# Patient Record
Sex: FEMALE | Race: WHITE | Employment: OTHER | URBAN - METROPOLITAN AREA
[De-identification: names, ages, dates, MRNs, and addresses within clinical notes are randomized per-mention and may not be internally consistent; named-entity substitution may affect disease eponyms.]

---

## 2018-06-06 RX ORDER — CARVEDILOL 25 MG/1
25 TABLET ORAL 2 TIMES DAILY WITH MEALS
Qty: 180 TABLET | Refills: 3 | Status: SHIPPED | OUTPATIENT
Start: 2018-06-06 | End: 2019-06-13 | Stop reason: SDUPTHER

## 2018-07-09 LAB — DIABETIC RETINOPATHY: NEGATIVE

## 2018-07-12 ENCOUNTER — OFFICE VISIT (OUTPATIENT)
Dept: CARDIOLOGY CLINIC | Age: 68
End: 2018-07-12
Payer: COMMERCIAL

## 2018-07-12 VITALS
HEART RATE: 79 BPM | WEIGHT: 245 LBS | RESPIRATION RATE: 16 BRPM | SYSTOLIC BLOOD PRESSURE: 134 MMHG | HEIGHT: 62 IN | BODY MASS INDEX: 45.08 KG/M2 | DIASTOLIC BLOOD PRESSURE: 78 MMHG

## 2018-07-12 DIAGNOSIS — I10 ESSENTIAL HYPERTENSION: Primary | ICD-10-CM

## 2018-07-12 DIAGNOSIS — R01.1 CARDIAC MURMUR: ICD-10-CM

## 2018-07-12 PROCEDURE — 99213 OFFICE O/P EST LOW 20 MIN: CPT | Performed by: INTERNAL MEDICINE

## 2018-07-12 PROCEDURE — 93000 ELECTROCARDIOGRAM COMPLETE: CPT | Performed by: INTERNAL MEDICINE

## 2018-07-12 NOTE — PROGRESS NOTES
vein thrombosis\". 20. Limited echo, 07/20/2016. EF 55-65%  Mild CLVH. Stage II diastolic dysfunction. Mild LA dilatation. 21. ER evaluation, 03/22/2016. Facial swelling. Lisinopril stopped, 03/2016.   22. Follow up with Dr. Joaquina Granado, 2017.        Review of Systems:  Constitutional: negative for fever and chills  Respiratory: negative for cough and hemoptysis  Cardiovascular:   Gastrointestinal: negative for abdominal pain, diarrhea, nausea and vomiting  Genitourinary:negative for dysuria and hematuria  Derm: negative for rash and skin lesion(s)  Neurological: negative for seizures and tremors  Endocrine: negative for diabetic symptoms including polydipsia and polyuria  Musculoskeletal: negative for CTD  Psychiatric: negative for psychosis and major depression    On examination,  she is an overweight, pleasant, middle-aged female in no distress. Skin is warm and dry. Respirations are unlabored. /78   Pulse 79   Resp 16   Ht 5' 2\" (1.575 m)   Wt 245 lb (111.1 kg)   BMI 44.81 kg/m² . HEENT negative for scleral icterus. Extraocular muscles intact. No facial asymmetry or central cyanosis. Neck without masses or goiter. No bruit or JVD. Cardiac apex not displaced. Rhythm regular. 2/6 MAYDA upper left sternal border. Diastole Yaquelin. Abdomen normal.  Extremities without edema. EKG today shows  Normal sinus rhythm. LBBB. She continues to have a heart murmur. She has a history of depressed ejection fraction. An echo will therefore be repeated for reassessment. She had concerns today regarding anticoagulation and its continuation. She saw  Dr. Joaquina Granado, 2017. His consult note will be obtained and final recommendations made.     At completion of today's visit, medications include the following:    Current Outpatient Prescriptions:     carvedilol (COREG) 25 MG tablet, Take 1 tablet by mouth 2 times daily (with meals), Disp: 180 tablet, Rfl: 3    alendronate (FOSAMAX)

## 2018-07-31 ENCOUNTER — HOSPITAL ENCOUNTER (OUTPATIENT)
Dept: CARDIOLOGY | Age: 68
Discharge: HOME | End: 2018-07-31
Payer: COMMERCIAL

## 2018-07-31 DIAGNOSIS — R01.1 CARDIAC MURMUR: ICD-10-CM

## 2018-07-31 LAB
LV EF: 58 %
LVEF MODALITY: NORMAL

## 2018-07-31 PROCEDURE — 93306 TTE W/DOPPLER COMPLETE: CPT | Performed by: PSYCHIATRY & NEUROLOGY

## 2018-08-08 ENCOUNTER — TELEPHONE (OUTPATIENT)
Dept: CARDIOLOGY CLINIC | Age: 68
End: 2018-08-08

## 2019-03-08 ENCOUNTER — HOSPITAL ENCOUNTER (OUTPATIENT)
Dept: INFUSION THERAPY | Age: 69
Discharge: HOME | End: 2019-03-08
Payer: COMMERCIAL

## 2019-03-08 ENCOUNTER — OFFICE VISIT (OUTPATIENT)
Dept: ONCOLOGY | Age: 69
End: 2019-03-08
Payer: COMMERCIAL

## 2019-03-08 ENCOUNTER — HOSPITAL ENCOUNTER (OUTPATIENT)
Age: 69
Discharge: HOME | End: 2019-03-08
Payer: COMMERCIAL

## 2019-03-08 VITALS
HEIGHT: 62 IN | SYSTOLIC BLOOD PRESSURE: 135 MMHG | DIASTOLIC BLOOD PRESSURE: 69 MMHG | RESPIRATION RATE: 18 BRPM | TEMPERATURE: 98.9 F | BODY MASS INDEX: 45.36 KG/M2 | HEART RATE: 79 BPM | WEIGHT: 246.5 LBS

## 2019-03-08 DIAGNOSIS — O22.30 DVT (DEEP VEIN THROMBOSIS) IN PREGNANCY: ICD-10-CM

## 2019-03-08 DIAGNOSIS — O22.30 DVT (DEEP VEIN THROMBOSIS) IN PREGNANCY: Primary | ICD-10-CM

## 2019-03-08 LAB
ALBUMIN SERPL-MCNC: 3.7 G/DL (ref 3.5–5.2)
ALP BLD-CCNC: 41 U/L (ref 35–104)
ALT SERPL-CCNC: 13 U/L (ref 0–32)
ANION GAP SERPL CALCULATED.3IONS-SCNC: 10 MMOL/L (ref 7–16)
AST SERPL-CCNC: 26 U/L (ref 0–31)
BASOPHILS ABSOLUTE: 0.06 E9/L (ref 0–0.2)
BASOPHILS RELATIVE PERCENT: 0.9 % (ref 0–2)
BILIRUB SERPL-MCNC: 0.5 MG/DL (ref 0–1.2)
BUN BLDV-MCNC: 15 MG/DL (ref 8–23)
CALCIUM SERPL-MCNC: 9.1 MG/DL (ref 8.6–10.2)
CHLORIDE BLD-SCNC: 97 MMOL/L (ref 98–107)
CO2: 29 MMOL/L (ref 22–29)
CREAT SERPL-MCNC: 0.9 MG/DL (ref 0.5–1)
EOSINOPHILS ABSOLUTE: 0.18 E9/L (ref 0.05–0.5)
EOSINOPHILS RELATIVE PERCENT: 2.6 % (ref 0–6)
GFR AFRICAN AMERICAN: >60
GFR NON-AFRICAN AMERICAN: >60 ML/MIN/1.73
GLUCOSE BLD-MCNC: 172 MG/DL (ref 74–99)
HCT VFR BLD CALC: 41.4 % (ref 34–48)
HEMOGLOBIN: 13.6 G/DL (ref 11.5–15.5)
IMMATURE GRANULOCYTES #: 0.02 E9/L
IMMATURE GRANULOCYTES %: 0.3 % (ref 0–5)
LYMPHOCYTES ABSOLUTE: 1.69 E9/L (ref 1.5–4)
LYMPHOCYTES RELATIVE PERCENT: 24.7 % (ref 20–42)
MCH RBC QN AUTO: 29.8 PG (ref 26–35)
MCHC RBC AUTO-ENTMCNC: 32.9 % (ref 32–34.5)
MCV RBC AUTO: 90.8 FL (ref 80–99.9)
MONOCYTES ABSOLUTE: 0.66 E9/L (ref 0.1–0.95)
MONOCYTES RELATIVE PERCENT: 9.6 % (ref 2–12)
NEUTROPHILS ABSOLUTE: 4.24 E9/L (ref 1.8–7.3)
NEUTROPHILS RELATIVE PERCENT: 61.9 % (ref 43–80)
PDW BLD-RTO: 12.4 FL (ref 11.5–15)
PLATELET # BLD: 165 E9/L (ref 130–450)
PMV BLD AUTO: 9.8 FL (ref 7–12)
POTASSIUM SERPL-SCNC: 4.8 MMOL/L (ref 3.5–5)
RBC # BLD: 4.56 E12/L (ref 3.5–5.5)
SODIUM BLD-SCNC: 136 MMOL/L (ref 132–146)
TOTAL PROTEIN: 7.4 G/DL (ref 6.4–8.3)
WBC # BLD: 6.9 E9/L (ref 4.5–11.5)

## 2019-03-08 PROCEDURE — 85025 COMPLETE CBC W/AUTO DIFF WBC: CPT

## 2019-03-08 PROCEDURE — 36415 COLL VENOUS BLD VENIPUNCTURE: CPT

## 2019-03-08 PROCEDURE — 81241 F5 GENE: CPT

## 2019-03-08 PROCEDURE — 81240 F2 GENE: CPT

## 2019-03-08 PROCEDURE — 99205 OFFICE O/P NEW HI 60 MIN: CPT | Performed by: INTERNAL MEDICINE

## 2019-03-08 PROCEDURE — 86147 CARDIOLIPIN ANTIBODY EA IG: CPT

## 2019-03-08 PROCEDURE — 86146 BETA-2 GLYCOPROTEIN ANTIBODY: CPT

## 2019-03-08 PROCEDURE — 99214 OFFICE O/P EST MOD 30 MIN: CPT | Performed by: INTERNAL MEDICINE

## 2019-03-08 PROCEDURE — 80053 COMPREHEN METABOLIC PANEL: CPT

## 2019-03-11 LAB
ANTICARDIOLIPIN IGA ANTIBODY: 3 APL (ref 0–11)
ANTICARDIOLIPIN IGG ANTIBODY: 0 GPL (ref 0–14)
BETA-2 GLYCOPROTEIN 1 IGG ANTIBODY: 0 SGU (ref 0–20)
BETA-2 GLYCOPROTEIN 1 IGM ANTIBODY: 0 SMU (ref 0–20)
CARDIOLIPIN AB IGM: 0 MPL (ref 0–12)

## 2019-03-14 LAB
FACTOR V LEIDEN: ABNORMAL
SPECIMEN: ABNORMAL

## 2019-03-15 LAB
PROTHROMBIN G20210A MUTATION: NEGATIVE
PT PCR SPECIMEN: NORMAL

## 2019-03-29 ENCOUNTER — HOSPITAL ENCOUNTER (OUTPATIENT)
Dept: INFUSION THERAPY | Age: 69
Discharge: HOME | End: 2019-03-29
Payer: COMMERCIAL

## 2019-03-29 ENCOUNTER — OFFICE VISIT (OUTPATIENT)
Dept: ONCOLOGY | Age: 69
End: 2019-03-29
Payer: COMMERCIAL

## 2019-03-29 ENCOUNTER — HOSPITAL ENCOUNTER (OUTPATIENT)
Age: 69
Discharge: HOME | End: 2019-03-29
Payer: COMMERCIAL

## 2019-03-29 VITALS
SYSTOLIC BLOOD PRESSURE: 143 MMHG | BODY MASS INDEX: 46.04 KG/M2 | HEIGHT: 62 IN | DIASTOLIC BLOOD PRESSURE: 67 MMHG | WEIGHT: 250.2 LBS | HEART RATE: 72 BPM

## 2019-03-29 DIAGNOSIS — O22.30 DVT (DEEP VEIN THROMBOSIS) IN PREGNANCY: Primary | ICD-10-CM

## 2019-03-29 PROCEDURE — 99213 OFFICE O/P EST LOW 20 MIN: CPT | Performed by: INTERNAL MEDICINE

## 2019-03-29 PROCEDURE — 99214 OFFICE O/P EST MOD 30 MIN: CPT | Performed by: INTERNAL MEDICINE

## 2019-05-07 ENCOUNTER — OFFICE VISIT (OUTPATIENT)
Dept: FAMILY MEDICINE CLINIC | Age: 69
End: 2019-05-07
Payer: COMMERCIAL

## 2019-05-07 VITALS
BODY MASS INDEX: 47.11 KG/M2 | OXYGEN SATURATION: 97 % | DIASTOLIC BLOOD PRESSURE: 84 MMHG | TEMPERATURE: 98.8 F | SYSTOLIC BLOOD PRESSURE: 136 MMHG | HEART RATE: 72 BPM | WEIGHT: 256 LBS | HEIGHT: 62 IN

## 2019-05-07 DIAGNOSIS — J01.90 ACUTE BACTERIAL SINUSITIS: ICD-10-CM

## 2019-05-07 DIAGNOSIS — B96.89 ACUTE BACTERIAL SINUSITIS: ICD-10-CM

## 2019-05-07 DIAGNOSIS — B35.4 TINEA CORPORIS: ICD-10-CM

## 2019-05-07 DIAGNOSIS — J40 BRONCHITIS: Primary | ICD-10-CM

## 2019-05-07 PROCEDURE — 94640 AIRWAY INHALATION TREATMENT: CPT | Performed by: FAMILY MEDICINE

## 2019-05-07 PROCEDURE — 96372 THER/PROPH/DIAG INJ SC/IM: CPT | Performed by: FAMILY MEDICINE

## 2019-05-07 PROCEDURE — 99213 OFFICE O/P EST LOW 20 MIN: CPT | Performed by: FAMILY MEDICINE

## 2019-05-07 RX ORDER — PREDNISONE 20 MG/1
TABLET ORAL
Qty: 30 TABLET | Refills: 0 | Status: SHIPPED | OUTPATIENT
Start: 2019-05-07 | End: 2019-05-31

## 2019-05-07 RX ORDER — CEFDINIR 300 MG/1
300 CAPSULE ORAL 2 TIMES DAILY
Qty: 20 CAPSULE | Refills: 0 | Status: SHIPPED | OUTPATIENT
Start: 2019-05-07 | End: 2019-05-17

## 2019-05-07 RX ORDER — METHYLPREDNISOLONE ACETATE 40 MG/ML
40 INJECTION, SUSPENSION INTRA-ARTICULAR; INTRALESIONAL; INTRAMUSCULAR; SOFT TISSUE ONCE
Qty: 1 ML | Refills: 0
Start: 2019-05-07 | End: 2019-05-07

## 2019-05-07 RX ORDER — ALBUTEROL SULFATE 1.25 MG/3ML
1.25 SOLUTION RESPIRATORY (INHALATION) ONCE
Status: COMPLETED | OUTPATIENT
Start: 2019-05-07 | End: 2019-05-07

## 2019-05-07 RX ORDER — METHYLPREDNISOLONE ACETATE 40 MG/ML
40 INJECTION, SUSPENSION INTRA-ARTICULAR; INTRALESIONAL; INTRAMUSCULAR; SOFT TISSUE ONCE
Status: COMPLETED | OUTPATIENT
Start: 2019-05-07 | End: 2019-05-07

## 2019-05-07 RX ADMIN — ALBUTEROL SULFATE 1.25 MG: 1.25 SOLUTION RESPIRATORY (INHALATION) at 11:24

## 2019-05-07 RX ADMIN — METHYLPREDNISOLONE ACETATE 40 MG: 40 INJECTION, SUSPENSION INTRA-ARTICULAR; INTRALESIONAL; INTRAMUSCULAR; SOFT TISSUE at 11:28

## 2019-05-07 ASSESSMENT — ENCOUNTER SYMPTOMS
GASTROINTESTINAL NEGATIVE: 1
COUGH: 1
WHEEZING: 1

## 2019-05-07 NOTE — PROGRESS NOTES
[facility]) Physicians   Internal Medicine     2019  Gaby Kelley : 1950 Sex: female  Age:73 y.o. Chief Complaint   Patient presents with    Congestion     [id number]    Wheezing        HPI  Patient presents to express care with c/o cough and congestion and wheezing for past 2 weeks, denies fever/chills. Patient with c/o redness around umbilicus. Review of Systems   Constitutional: Negative. HENT: Positive for congestion and postnasal drip. Respiratory: Positive for cough and wheezing. Cardiovascular: Negative. Gastrointestinal: Negative. Endocrine: Negative. Genitourinary: Negative. Skin: Positive for rash. Current Outpatient Medications:     methylPREDNISolone acetate (DEPO-MEDROL) 40 MG/ML injection, Inject 1 mL into the muscle once for 1 dose, Disp: 1 mL, Rfl: 0    cefdinir (OMNICEF) 300 MG capsule, Take 1 capsule by mouth 2 times daily for 10 days, Disp: 20 capsule, Rfl: 0    predniSONE (DELTASONE) 20 MG tablet, 1 po bid x 5 days, then 1/2 po daily x 5 days, Disp: 30 tablet, Rfl: 0    nystatin-triamcinolone (MYCOLOG II) 147142-5.1 UNIT/GM-% cream, Apply topically 2 times daily. , Disp: 30 g, Rfl: 0    PROAIR  (90 Base) MCG/ACT inhaler, Inhale 1 puff into the lungs 4 times daily, Disp: 1 Inhaler, Rfl: 1    QVAR 80 MCG/ACT inhaler, Inhale 1 puff into the lungs 2 times daily, Disp: 1 Inhaler, Rfl: 1    apixaban (ELIQUIS) 5 MG TABS tablet, Take 1 tablet by mouth 2 times daily To be started on 2019, Disp: 60 tablet, Rfl: 3    carvedilol (COREG) 25 MG tablet, Take 1 tablet by mouth 2 times daily (with meals), Disp: 180 tablet, Rfl: 3    alendronate (FOSAMAX) 70 MG tablet, Take 70 mg by mouth every 7 days , Disp: , Rfl:     potassium chloride (MICRO-K) 10 MEQ extended release capsule, Take 10 mEq by mouth every other day , Disp: , Rfl:     furosemide (LASIX) 20 MG tablet, 20 mg daily, Disp: , Rfl:     hydrALAZINE (APRESOLINE) 50 MG tablet, 50 mg 2 times daily, Disp: , Rfl:     traMADol (ULTRAM) 50 MG tablet, Take 50 mg by mouth every 6 hours as needed , Disp: , Rfl:     lovastatin (MEVACOR) 40 MG tablet, Take 40 mg by mouth nightly , Disp: , Rfl:     metFORMIN (GLUCOPHAGE) 500 MG tablet, Take 500 mg by mouth daily (with breakfast) , Disp: , Rfl:     VITAMIN D-3 (COLECALCIFEROL) 400 UNITS TABS, Take  by mouth daily. , Disp: , Rfl:     enoxaparin (LOVENOX) 100 MG/ML injection, Inject into the skin 2 times daily, Disp: , Rfl:     Allergies   Allergen Reactions    E-Mycin [Erythromycin]      Made her dizzy and she didn't feel well       Past Medical History:   Diagnosis Date    Hyperlipidemia     Hypertension     LBBB (left bundle branch block)     Obesity        Past Surgical History:   Procedure Laterality Date    APPENDECTOMY      CHOLECYSTECTOMY      COLOSTOMY      due to divertiulosis since than it was reversed    HERNIA REPAIR      HYSTERECTOMY, TOTAL ABDOMINAL         Family History   Problem Relation Age of Onset    Cancer Mother     Diabetes Father     Kidney Disease Father        Social History     Socioeconomic History    Marital status:      Spouse name: Not on file    Number of children: Not on file    Years of education: Not on file    Highest education level: Not on file   Occupational History    Not on file   Social Needs    Financial resource strain: Not on file    Food insecurity:     Worry: Not on file     Inability: Not on file    Transportation needs:     Medical: Not on file     Non-medical: Not on file   Tobacco Use    Smoking status: Former Smoker     Packs/day: 1.00     Years: 40.00     Pack years: 40.00     Types: Cigarettes     Last attempt to quit: 2008     Years since quittin.3    Smokeless tobacco: Never Used   Substance and Sexual Activity    Alcohol use: Yes     Comment: occassionally    Drug use: No    Sexual activity: Not Currently   Lifestyle    Physical activity: Days per week: Not on file     Minutes per session: Not on file    Stress: Not on file   Relationships    Social connections:     Talks on phone: Not on file     Gets together: Not on file     Attends Anglican service: Not on file     Active member of club or organization: Not on file     Attends meetings of clubs or organizations: Not on file     Relationship status: Not on file    Intimate partner violence:     Fear of current or ex partner: Not on file     Emotionally abused: Not on file     Physically abused: Not on file     Forced sexual activity: Not on file   Other Topics Concern    Not on file   Social History Narrative    Not on file       Vitals:    05/07/19 1040   BP: 136/84   Pulse: 72   Temp: 98.8 °F (37.1 °C)   SpO2: 97%   Weight: 256 lb (116.1 kg)   Height: 5' 2\" (1.575 m)       Exam:  Physical Exam   Constitutional: She appears well-developed and well-nourished. HENT:   Head: Normocephalic and atraumatic. Eyes: Pupils are equal, round, and reactive to light. Conjunctivae and EOM are normal.   Neck: Neck supple. Cardiovascular: Normal rate and regular rhythm. Pulmonary/Chest: She has wheezes. Aerosol treatment with albuterol given with improvement in breath sounds and wheezing   Abdominal: Soft. Bowel sounds are normal.   Skin:   Mild erythema noted umbilicus       Assessment and Plan:    Yoeltte Dawn was seen today for congestion and wheezing. Diagnoses and all orders for this visit:    Bronchitis  -     methylPREDNISolone acetate (DEPO-MEDROL) injection 40 mg    Acute bacterial sinusitis  -     methylPREDNISolone acetate (DEPO-MEDROL) injection 40 mg    Tinea corporis    Other orders  -     methylPREDNISolone acetate (DEPO-MEDROL) 40 MG/ML injection; Inject 1 mL into the muscle once for 1 dose  -     albuterol (ACCUNEB) nebulizer solution 1.25 mg  -     cefdinir (OMNICEF) 300 MG capsule;  Take 1 capsule by mouth 2 times daily for 10 days  -     predniSONE (DELTASONE) 20 MG tablet; 1 po bid x 5 days, then 1/2 po daily x 5 days  -     nystatin-triamcinolone (MYCOLOG II) 666323-6.1 UNIT/GM-% cream; Apply topically 2 times daily.  -     PROAIR  (90 Base) MCG/ACT inhaler; Inhale 1 puff into the lungs 4 times daily  -     QVAR 80 MCG/ACT inhaler; Inhale 1 puff into the lungs 2 times daily       Fluids, rest, probiotic, tylenol and/or motrin for fever or discomfort, discussed s/s that would warrant reeval  No follow-ups on file. No orders of the defined types were placed in this encounter.       Sreekanth Moyer MD  5/7/2019  12:10 PM

## 2019-05-17 DIAGNOSIS — I10 HTN (HYPERTENSION): ICD-10-CM

## 2019-05-17 DIAGNOSIS — I51.9 SYSTOLIC DYSFUNCTION: ICD-10-CM

## 2019-05-17 DIAGNOSIS — G89.4 CHRONIC PAIN SYNDROME: Primary | ICD-10-CM

## 2019-05-17 DIAGNOSIS — I51.89 DIASTOLIC DYSFUNCTION: ICD-10-CM

## 2019-05-17 RX ORDER — TRAMADOL HYDROCHLORIDE 50 MG/1
50 TABLET ORAL EVERY 6 HOURS PRN
Qty: 90 TABLET | Refills: 0 | Status: SHIPPED | OUTPATIENT
Start: 2019-05-17 | End: 2019-06-16

## 2019-05-20 ENCOUNTER — TELEPHONE (OUTPATIENT)
Dept: ONCOLOGY | Age: 69
End: 2019-05-20

## 2019-05-20 NOTE — TELEPHONE ENCOUNTER
Spoke with Dallas Dick this morning to give her scheduled appt info for her [facility] for [address] [place] [address] facility. She is scheduled Thurs 10/23. Arrive at 10:15. Scan at 10:45. Go to [facility]. [facility] address for [facility]. Dallas Dick wanted to know if she could go to [facility] facility in [place]. I gave her scheduling's phone number to call back and reschedule if possible. Verified Mirna's appt with Dr. Glendora Goldmann for 5/31 and explained if she rescheduled her scan it would need to be before the 5/31 appt with Dr. Glendora Goldmann.

## 2019-05-23 ENCOUNTER — HOSPITAL ENCOUNTER (OUTPATIENT)
Dept: ULTRASOUND IMAGING | Age: 69
Discharge: HOME | End: 2019-05-25
Payer: COMMERCIAL

## 2019-05-23 DIAGNOSIS — O22.30 DVT (DEEP VEIN THROMBOSIS) IN PREGNANCY: ICD-10-CM

## 2019-05-23 PROCEDURE — 93971 EXTREMITY STUDY: CPT

## 2019-05-31 ENCOUNTER — OFFICE VISIT (OUTPATIENT)
Dept: ONCOLOGY | Age: 69
End: 2019-05-31
Payer: COMMERCIAL

## 2019-05-31 ENCOUNTER — HOSPITAL ENCOUNTER (OUTPATIENT)
Dept: INFUSION THERAPY | Age: 69
Discharge: HOME | End: 2019-05-31
Payer: COMMERCIAL

## 2019-05-31 VITALS
HEIGHT: 62 IN | TEMPERATURE: 96 F | BODY MASS INDEX: 46.91 KG/M2 | SYSTOLIC BLOOD PRESSURE: 149 MMHG | WEIGHT: 254.9 LBS | DIASTOLIC BLOOD PRESSURE: 69 MMHG | HEART RATE: 67 BPM

## 2019-05-31 DIAGNOSIS — O22.30 DVT (DEEP VEIN THROMBOSIS) IN PREGNANCY: Primary | ICD-10-CM

## 2019-05-31 PROCEDURE — 99212 OFFICE O/P EST SF 10 MIN: CPT | Performed by: INTERNAL MEDICINE

## 2019-05-31 PROCEDURE — 99214 OFFICE O/P EST MOD 30 MIN: CPT | Performed by: INTERNAL MEDICINE

## 2019-06-02 NOTE — PROGRESS NOTES
[address] MED ONCOLOGY  [facility] [id number]  Dept: [phone]  Attending Progress Note      Reason for Visit:   1. Recurrent VTE. 2. Factor V Leiden Homozygosity. Referring Physician:  Yulissa Peralta NP    PCP:  Marisol Renteria MD    History of Present Illness: The patient is a pleasant 59-year-old lady with a PMH significant for obesity, HTN, and hyperlipidemia, who was diagnosed with PE and bilateral DVTs that were provoked following ventral hernia repair in March 2017, she had her surgery done in [facility], she was anticoagulated with Coumadin, she was complaining of left knee pain, had a venous US done on 3/1/2019, revealing an acute DVT in the left posterior tibial vein, chronic appearing thromus from proximal femoral to peroneal veins. She was started on Lovenox 100 mg subq bid, INR was therapeutic, 2.2. She does not like the injections, prefers to be on an oral anticoagulant, AC was changed to Eliquis, no reported SE. FH is negative for VTE. The pain in the LLE had resolved, she has LLE, but she has not been taking the lasix as prescribed. Review of Systems;  CONSTITUTIONAL: No fever, chills. Good appetite, fair energy level. Pos for intentional weight loss. ENMT: Eyes: No diplopia; Nose: No epistaxis. Mouth: No sore throat. RESPIRATORY: No hemoptysis, shortness of breath, cough. CARDIOVASCULAR: No chest pain, palpitations. GASTROINTESTINAL: No nausea/vomiting, abdominal pain, diarrhea/constipation. GENITOURINARY: No dysuria, urinary frequency, hematuria. NEURO: No syncope, presyncope, headache.   Remainder:  ROS NEGATIVE    Past Medical History:      Diagnosis Date    Hyperlipidemia     Hypertension     LBBB (left bundle branch block)     Obesity      Patient Active Problem List   Diagnosis    Hyperlipidemia    Hypertension    LBBB (left bundle branch block)        Past Surgical History: Procedure Laterality Date    APPENDECTOMY      CHOLECYSTECTOMY      COLOSTOMY      due to divertiulosis since than it was reversed    HERNIA REPAIR      HYSTERECTOMY, TOTAL ABDOMINAL         Family History:  Family History   Problem Relation Age of Onset    Cancer Mother     Diabetes Father     Kidney Disease Father        Medications:  Reviewed and reconciled. Social History:  Social History     Socioeconomic History    Marital status:      Spouse name: Not on file    Number of children: Not on file    Years of education: Not on file    Highest education level: Not on file   Occupational History    Not on file   Social Needs    Financial resource strain: Not on file    Food insecurity:     Worry: Not on file     Inability: Not on file    Transportation needs:     Medical: Not on file     Non-medical: Not on file   Tobacco Use    Smoking status: Former Smoker     Packs/day: 1.00     Years: 40.00     Pack years: 40.00     Types: Cigarettes     Last attempt to quit: 2008     Years since quittin.4    Smokeless tobacco: Never Used   Substance and Sexual Activity    Alcohol use: Yes     Comment: occassionally    Drug use: No    Sexual activity: Not Currently   Lifestyle    Physical activity:     Days per week: Not on file     Minutes per session: Not on file    Stress: Not on file   Relationships    Social connections:     Talks on phone: Not on file     Gets together: Not on file     Attends Pentecostalism service: Not on file     Active member of club or organization: Not on file     Attends meetings of clubs or organizations: Not on file     Relationship status: Not on file    Intimate partner violence:     Fear of current or ex partner: Not on file     Emotionally abused: Not on file     Physically abused: Not on file     Forced sexual activity: Not on file   Other Topics Concern    Not on file   Social History Narrative    Not on file       Allergies:   Allergies decreased mobility increase the risk of VTE, I encouraged her to continue with weight loss. She had a f/up LLE venous US done revealing improvement of the DVT, she has a persistent occlusive thrombus in the proximal left superficial femoral vein and nonocclusive thrombus in the mid to distal superficial femoral vein, it is hard to tell for how long she had had those clots, continue  Eliquis, I do recommend lifelong anticoagulation unless if she has a contraindication to [facility] [place] by [facility] in the future, such as bleeding, refilled Eliquis. She will take the LAsix as prescribed, she can start using compression stockings. RTC in 1 year, sooner if new problems. Thank you for allowing us to participate in the care of Mrs. Banuleos.     Tutu Rodriguez MD   HEMATOLOGY/MEDICAL ONCOLOGY  [address] [place] [address] MED ONCOLOGY  [address] [place] [id number]  Dept: [phone]

## 2019-06-13 RX ORDER — CARVEDILOL 25 MG/1
25 TABLET ORAL 2 TIMES DAILY WITH MEALS
Qty: 180 TABLET | Refills: 3 | Status: SHIPPED
Start: 2019-06-13 | End: 2020-06-15

## 2019-06-14 RX ORDER — HYDRALAZINE HYDROCHLORIDE 50 MG/1
50 TABLET, FILM COATED ORAL 2 TIMES DAILY
Qty: 180 TABLET | Refills: 1 | Status: SHIPPED
Start: 2019-06-14 | End: 2020-03-09 | Stop reason: SDUPTHER

## 2019-06-28 DIAGNOSIS — G89.4 CHRONIC PAIN SYNDROME: Primary | ICD-10-CM

## 2019-06-28 RX ORDER — TRAMADOL HYDROCHLORIDE 50 MG/1
TABLET ORAL
COMMUNITY
Start: 2009-12-29 | End: 2019-06-28 | Stop reason: SDUPTHER

## 2019-06-30 RX ORDER — TRAMADOL HYDROCHLORIDE 50 MG/1
50 TABLET ORAL EVERY 8 HOURS PRN
Qty: 90 TABLET | Refills: 0 | Status: SHIPPED | OUTPATIENT
Start: 2019-06-30 | End: 2019-07-30

## 2019-07-03 ENCOUNTER — OFFICE VISIT (OUTPATIENT)
Dept: PRIMARY CARE CLINIC | Age: 69
End: 2019-07-03
Payer: MEDICARE

## 2019-07-03 VITALS
WEIGHT: 255 LBS | TEMPERATURE: 97.7 F | OXYGEN SATURATION: 98 % | HEART RATE: 78 BPM | BODY MASS INDEX: 46.93 KG/M2 | SYSTOLIC BLOOD PRESSURE: 136 MMHG | DIASTOLIC BLOOD PRESSURE: 74 MMHG | HEIGHT: 62 IN

## 2019-07-03 DIAGNOSIS — I50.32 CHRONIC DIASTOLIC (CONGESTIVE) HEART FAILURE (HCC): ICD-10-CM

## 2019-07-03 DIAGNOSIS — E78.2 MIXED HYPERLIPIDEMIA: ICD-10-CM

## 2019-07-03 DIAGNOSIS — F41.1 GENERALIZED ANXIETY DISORDER: ICD-10-CM

## 2019-07-03 DIAGNOSIS — J30.1 SEASONAL ALLERGIC RHINITIS DUE TO POLLEN: ICD-10-CM

## 2019-07-03 DIAGNOSIS — E66.01 MORBID OBESITY WITH BMI OF 45.0-49.9, ADULT (HCC): ICD-10-CM

## 2019-07-03 DIAGNOSIS — Z86.711 PERSONAL HISTORY OF PULMONARY EMBOLISM: ICD-10-CM

## 2019-07-03 DIAGNOSIS — E11.8 TYPE 2 DIABETES MELLITUS WITH COMPLICATION, WITHOUT LONG-TERM CURRENT USE OF INSULIN (HCC): ICD-10-CM

## 2019-07-03 DIAGNOSIS — M15.0 PRIMARY GENERALIZED (OSTEO)ARTHRITIS: ICD-10-CM

## 2019-07-03 DIAGNOSIS — G89.4 CHRONIC PAIN SYNDROME: ICD-10-CM

## 2019-07-03 DIAGNOSIS — E66.01 CLASS 3 SEVERE OBESITY DUE TO EXCESS CALORIES WITH SERIOUS COMORBIDITY AND BODY MASS INDEX (BMI) OF 45.0 TO 49.9 IN ADULT (HCC): ICD-10-CM

## 2019-07-03 DIAGNOSIS — I82.599 CHRONIC DEEP VEIN THROMBOSIS (DVT) OF OTHER VEIN OF LOWER EXTREMITY, UNSPECIFIED LATERALITY (HCC): Primary | ICD-10-CM

## 2019-07-03 DIAGNOSIS — M81.0 AGE-RELATED OSTEOPOROSIS WITHOUT CURRENT PATHOLOGICAL FRACTURE: ICD-10-CM

## 2019-07-03 DIAGNOSIS — E55.9 VITAMIN D DEFICIENCY: ICD-10-CM

## 2019-07-03 DIAGNOSIS — I87.2 VENOUS INSUFFICIENCY: ICD-10-CM

## 2019-07-03 DIAGNOSIS — I10 ESSENTIAL HYPERTENSION: ICD-10-CM

## 2019-07-03 DIAGNOSIS — E03.8 OTHER SPECIFIED HYPOTHYROIDISM: ICD-10-CM

## 2019-07-03 PROBLEM — I82.509 CHRONIC DEEP VEIN THROMBOSIS (DVT) OF LOWER EXTREMITY (HCC): Status: ACTIVE | Noted: 2019-07-03

## 2019-07-03 PROCEDURE — 99214 OFFICE O/P EST MOD 30 MIN: CPT | Performed by: FAMILY MEDICINE

## 2019-07-03 PROCEDURE — 96372 THER/PROPH/DIAG INJ SC/IM: CPT | Performed by: FAMILY MEDICINE

## 2019-07-03 RX ORDER — METHYLPREDNISOLONE ACETATE 80 MG/ML
40 INJECTION, SUSPENSION INTRA-ARTICULAR; INTRALESIONAL; INTRAMUSCULAR; SOFT TISSUE ONCE
Status: COMPLETED | OUTPATIENT
Start: 2019-07-03 | End: 2019-07-03

## 2019-07-03 RX ORDER — ALENDRONATE SODIUM 70 MG/1
70 TABLET ORAL
Qty: 10 TABLET | Refills: 2 | Status: SHIPPED | OUTPATIENT
Start: 2019-07-03 | End: 2019-10-21 | Stop reason: SDUPTHER

## 2019-07-03 RX ADMIN — METHYLPREDNISOLONE ACETATE 40 MG: 80 INJECTION, SUSPENSION INTRA-ARTICULAR; INTRALESIONAL; INTRAMUSCULAR; SOFT TISSUE at 10:54

## 2019-07-03 ASSESSMENT — ENCOUNTER SYMPTOMS
DIARRHEA: 0
EYE PAIN: 0
ABDOMINAL PAIN: 1
COLOR CHANGE: 0
BLOOD IN STOOL: 0
SHORTNESS OF BREATH: 0
SINUS PRESSURE: 0
CONSTIPATION: 0
EYE ITCHING: 0
BACK PAIN: 1
VOMITING: 0
EYE REDNESS: 0
SORE THROAT: 0
ABDOMINAL DISTENTION: 0
COUGH: 0
PHOTOPHOBIA: 0
SINUS PAIN: 0
WHEEZING: 0
CHEST TIGHTNESS: 0
NAUSEA: 0

## 2019-07-03 ASSESSMENT — PATIENT HEALTH QUESTIONNAIRE - PHQ9
2. FEELING DOWN, DEPRESSED OR HOPELESS: 1
1. LITTLE INTEREST OR PLEASURE IN DOING THINGS: 0
SUM OF ALL RESPONSES TO PHQ QUESTIONS 1-9: 1
SUM OF ALL RESPONSES TO PHQ9 QUESTIONS 1 & 2: 1
SUM OF ALL RESPONSES TO PHQ QUESTIONS 1-9: 1

## 2019-07-03 NOTE — PROGRESS NOTES
Standing Status:   Future     Standing Expiration Date:   7/3/2020    Lipid Panel     Standing Status:   Future     Standing Expiration Date:   7/3/2020     Order Specific Question:   Is Patient Fasting?/# of Hours     Answer:   12    Urinalysis     Standing Status:   Future     Standing Expiration Date:   7/3/2020    MICROALBUMIN / CREATININE URINE RATIO     Standing Status:   Future     Standing Expiration Date:   7/3/2020    Vitamin D 25 Hydroxy     Standing Status:   Future     Standing Expiration Date:   7/3/2020    TSH without Reflex     Standing Status:   Future     Standing Expiration Date:   7/3/2020    T4, Free     Standing Status:   Future     Standing Expiration Date:   7/3/2020       Kathy Bernal MD  7/3/2019  10:48 AM

## 2019-07-16 LAB
ALT SERPL-CCNC: NORMAL U/L
AST SERPL-CCNC: NORMAL U/L
AVERAGE GLUCOSE: 158
BASOPHILS ABSOLUTE: NORMAL /ΜL
BASOPHILS RELATIVE PERCENT: NORMAL %
BILIRUBIN, URINE: NORMAL
BLOOD, URINE: NORMAL
BUN BLDV-MCNC: NORMAL MG/DL
CALCIUM SERPL-MCNC: NORMAL MG/DL
CHLORIDE BLD-SCNC: NORMAL MMOL/L
CHOLESTEROL, TOTAL: 138 MG/DL
CHOLESTEROL/HDL RATIO: 2.6
CLARITY: NORMAL
CO2: NORMAL MMOL/L
COLOR: NORMAL
CREAT SERPL-MCNC: NORMAL MG/DL
CREATININE, URINE: 163
EOSINOPHILS ABSOLUTE: NORMAL /ΜL
EOSINOPHILS RELATIVE PERCENT: NORMAL %
GFR CALCULATED: NORMAL
GLUCOSE BLD-MCNC: NORMAL MG/DL
GLUCOSE URINE: NORMAL
HBA1C MFR BLD: 8 %
HCT VFR BLD CALC: NORMAL % (ref 36–46)
HDLC SERPL-MCNC: 53 MG/DL (ref 35–70)
HEMOGLOBIN: NORMAL G/DL (ref 12–16)
KETONES, URINE: NORMAL
LDL CHOLESTEROL CALCULATED: 62 MG/DL (ref 0–160)
LEUKOCYTE ESTERASE, URINE: NORMAL
LYMPHOCYTES ABSOLUTE: NORMAL /ΜL
LYMPHOCYTES RELATIVE PERCENT: NORMAL %
MCH RBC QN AUTO: NORMAL PG
MCHC RBC AUTO-ENTMCNC: NORMAL G/DL
MCV RBC AUTO: NORMAL FL
MICROALBUMIN/CREAT 24H UR: 3 MG/G{CREAT}
MICROALBUMIN/CREAT UR-RTO: 18
MONOCYTES ABSOLUTE: NORMAL /ΜL
MONOCYTES RELATIVE PERCENT: NORMAL %
NEUTROPHILS ABSOLUTE: NORMAL /ΜL
NEUTROPHILS RELATIVE PERCENT: NORMAL %
NITRITE, URINE: NORMAL
PH UA: NORMAL (ref 4.5–8)
PLATELET # BLD: NORMAL K/ΜL
PMV BLD AUTO: NORMAL FL
POTASSIUM SERPL-SCNC: NORMAL MMOL/L
PROTEIN UA: NORMAL
RBC # BLD: NORMAL 10^6/ΜL
SODIUM BLD-SCNC: NORMAL MMOL/L
SPECIFIC GRAVITY, URINE: NORMAL
T4 FREE: NORMAL
TRIGL SERPL-MCNC: 147 MG/DL
TSH SERPL DL<=0.05 MIU/L-ACNC: NORMAL UIU/ML
UROBILINOGEN, URINE: NORMAL
VITAMIN D 25-HYDROXY: NORMAL
VITAMIN D2, 25 HYDROXY: NORMAL
VITAMIN D3,25 HYDROXY: NORMAL
VLDLC SERPL CALC-MCNC: NORMAL MG/DL
WBC # BLD: NORMAL 10^3/ML

## 2019-07-17 ENCOUNTER — TELEPHONE (OUTPATIENT)
Dept: PRIMARY CARE CLINIC | Age: 69
End: 2019-07-17

## 2019-07-17 DIAGNOSIS — I10 ESSENTIAL HYPERTENSION: ICD-10-CM

## 2019-07-17 DIAGNOSIS — F41.1 GENERALIZED ANXIETY DISORDER: ICD-10-CM

## 2019-07-17 DIAGNOSIS — E11.8 TYPE 2 DIABETES MELLITUS WITH COMPLICATION, WITHOUT LONG-TERM CURRENT USE OF INSULIN (HCC): ICD-10-CM

## 2019-07-17 DIAGNOSIS — E78.2 MIXED HYPERLIPIDEMIA: ICD-10-CM

## 2019-07-17 DIAGNOSIS — E55.9 VITAMIN D DEFICIENCY: ICD-10-CM

## 2019-07-17 DIAGNOSIS — E03.8 OTHER SPECIFIED HYPOTHYROIDISM: ICD-10-CM

## 2019-07-19 RX ORDER — POTASSIUM CHLORIDE 750 MG/1
10 CAPSULE, EXTENDED RELEASE ORAL EVERY OTHER DAY
Qty: 90 CAPSULE | Refills: 1 | Status: SHIPPED | OUTPATIENT
Start: 2019-07-19 | End: 2019-11-06

## 2019-07-29 DIAGNOSIS — I82.599 CHRONIC DEEP VEIN THROMBOSIS (DVT) OF OTHER VEIN OF LOWER EXTREMITY, UNSPECIFIED LATERALITY (HCC): ICD-10-CM

## 2019-08-14 DIAGNOSIS — G89.4 CHRONIC PAIN SYNDROME: Primary | ICD-10-CM

## 2019-08-14 RX ORDER — TRAMADOL HYDROCHLORIDE 50 MG/1
50 TABLET ORAL EVERY 8 HOURS PRN
Qty: 90 TABLET | Refills: 0 | Status: SHIPPED | OUTPATIENT
Start: 2019-08-14 | End: 2019-09-13

## 2019-08-14 RX ORDER — TRAMADOL HYDROCHLORIDE 50 MG/1
TABLET ORAL
COMMUNITY
Start: 2009-12-29 | End: 2019-08-14 | Stop reason: SDUPTHER

## 2019-08-28 RX ORDER — TRIAMCINOLONE ACETONIDE 55 UG/1
2 SPRAY, METERED NASAL DAILY
COMMUNITY
End: 2019-11-06

## 2019-08-28 RX ORDER — TRIAMCINOLONE ACETONIDE 1 MG/G
CREAM TOPICAL 2 TIMES DAILY
COMMUNITY
End: 2020-03-11

## 2019-08-28 RX ORDER — BUDESONIDE AND FORMOTEROL FUMARATE DIHYDRATE 80; 4.5 UG/1; UG/1
2 AEROSOL RESPIRATORY (INHALATION) 2 TIMES DAILY
COMMUNITY
End: 2019-08-30 | Stop reason: SDUPTHER

## 2019-08-30 ENCOUNTER — OFFICE VISIT (OUTPATIENT)
Dept: PRIMARY CARE CLINIC | Age: 69
End: 2019-08-30
Payer: COMMERCIAL

## 2019-08-30 VITALS
BODY MASS INDEX: 49.48 KG/M2 | WEIGHT: 252 LBS | RESPIRATION RATE: 16 BRPM | OXYGEN SATURATION: 99 % | SYSTOLIC BLOOD PRESSURE: 128 MMHG | TEMPERATURE: 98 F | DIASTOLIC BLOOD PRESSURE: 70 MMHG | HEIGHT: 60 IN | HEART RATE: 80 BPM

## 2019-08-30 DIAGNOSIS — I50.32 CHRONIC DIASTOLIC (CONGESTIVE) HEART FAILURE (HCC): ICD-10-CM

## 2019-08-30 DIAGNOSIS — J30.1 SEASONAL ALLERGIC RHINITIS DUE TO POLLEN: ICD-10-CM

## 2019-08-30 DIAGNOSIS — F41.1 GENERALIZED ANXIETY DISORDER: ICD-10-CM

## 2019-08-30 DIAGNOSIS — M15.0 PRIMARY GENERALIZED (OSTEO)ARTHRITIS: ICD-10-CM

## 2019-08-30 DIAGNOSIS — G89.4 CHRONIC PAIN SYNDROME: ICD-10-CM

## 2019-08-30 DIAGNOSIS — I82.599 CHRONIC DEEP VEIN THROMBOSIS (DVT) OF OTHER VEIN OF LOWER EXTREMITY, UNSPECIFIED LATERALITY (HCC): ICD-10-CM

## 2019-08-30 DIAGNOSIS — M81.0 AGE-RELATED OSTEOPOROSIS WITHOUT CURRENT PATHOLOGICAL FRACTURE: ICD-10-CM

## 2019-08-30 DIAGNOSIS — I87.2 VENOUS INSUFFICIENCY: ICD-10-CM

## 2019-08-30 DIAGNOSIS — E55.9 VITAMIN D DEFICIENCY: ICD-10-CM

## 2019-08-30 DIAGNOSIS — Z12.39 SCREENING FOR BREAST CANCER: ICD-10-CM

## 2019-08-30 DIAGNOSIS — E78.2 MIXED HYPERLIPIDEMIA: ICD-10-CM

## 2019-08-30 DIAGNOSIS — J45.41 MODERATE PERSISTENT ASTHMA WITH ACUTE EXACERBATION: ICD-10-CM

## 2019-08-30 DIAGNOSIS — I44.7 LBBB (LEFT BUNDLE BRANCH BLOCK): ICD-10-CM

## 2019-08-30 DIAGNOSIS — E66.01 CLASS 3 SEVERE OBESITY DUE TO EXCESS CALORIES WITH SERIOUS COMORBIDITY AND BODY MASS INDEX (BMI) OF 45.0 TO 49.9 IN ADULT (HCC): ICD-10-CM

## 2019-08-30 DIAGNOSIS — I10 ESSENTIAL HYPERTENSION: Primary | ICD-10-CM

## 2019-08-30 DIAGNOSIS — E66.01 MORBID OBESITY WITH BMI OF 45.0-49.9, ADULT (HCC): ICD-10-CM

## 2019-08-30 DIAGNOSIS — E03.8 OTHER SPECIFIED HYPOTHYROIDISM: ICD-10-CM

## 2019-08-30 DIAGNOSIS — E11.8 TYPE 2 DIABETES MELLITUS WITH COMPLICATION, WITHOUT LONG-TERM CURRENT USE OF INSULIN (HCC): ICD-10-CM

## 2019-08-30 DIAGNOSIS — Z86.711 PERSONAL HISTORY OF PULMONARY EMBOLISM: ICD-10-CM

## 2019-08-30 PROBLEM — E66.813 CLASS 3 SEVERE OBESITY DUE TO EXCESS CALORIES WITH SERIOUS COMORBIDITY AND BODY MASS INDEX (BMI) OF 45.0 TO 49.9 IN ADULT: Status: ACTIVE | Noted: 2019-08-30

## 2019-08-30 PROCEDURE — 99215 OFFICE O/P EST HI 40 MIN: CPT | Performed by: FAMILY MEDICINE

## 2019-08-30 RX ORDER — METHYLPREDNISOLONE 4 MG/1
TABLET ORAL
Qty: 21 TABLET | Refills: 0 | Status: SHIPPED | OUTPATIENT
Start: 2019-08-30 | End: 2019-09-05

## 2019-08-30 RX ORDER — BUDESONIDE AND FORMOTEROL FUMARATE DIHYDRATE 80; 4.5 UG/1; UG/1
2 AEROSOL RESPIRATORY (INHALATION) 2 TIMES DAILY
Qty: 1 INHALER | Refills: 3 | Status: SHIPPED
Start: 2019-08-30 | End: 2020-03-11 | Stop reason: SDUPTHER

## 2019-08-30 ASSESSMENT — ENCOUNTER SYMPTOMS
VOMITING: 0
EYE ITCHING: 0
SINUS PRESSURE: 0
ABDOMINAL DISTENTION: 0
CHEST TIGHTNESS: 0
SINUS PAIN: 0
CONSTIPATION: 0
EYE REDNESS: 0
BACK PAIN: 1
NAUSEA: 0
DIARRHEA: 0
SORE THROAT: 0
BLOOD IN STOOL: 0
COUGH: 1
PHOTOPHOBIA: 0
ABDOMINAL PAIN: 1
WHEEZING: 0
EYE PAIN: 0
SHORTNESS OF BREATH: 1
COLOR CHANGE: 0

## 2019-08-30 NOTE — PROGRESS NOTES
Integument    Age-related osteoporosis without current pathological fracture    Relevant Medications    alendronate (FOSAMAX) 70 MG tablet    Other Relevant Orders    DEXA Bone Density 2 Sites    Primary generalized (osteo)arthritis    Relevant Medications    methylPREDNISolone sodium (SOLU-MEDROL) injection 125 mg (Completed)    dexamethasone (DECADRON) injection 10 mg (Completed)    methylPREDNISolone acetate (DEPO-MEDROL) injection 40 mg (Completed)    methylPREDNISolone acetate (DEPO-MEDROL) injection 40 mg (Completed)    traMADol (ULTRAM) 50 MG tablet    methylPREDNISolone (MEDROL DOSEPACK) 4 MG tablet       Other    Mixed hyperlipidemia    Relevant Medications    lovastatin (MEVACOR) 40 MG tablet    furosemide (LASIX) 20 MG tablet    carvedilol (COREG) 25 MG tablet    hydrALAZINE (APRESOLINE) 50 MG tablet    apixaban (ELIQUIS) 5 MG TABS tablet    Other Relevant Orders    Lipid Panel    Personal history of pulmonary embolism    Generalized anxiety disorder    Vitamin D deficiency    Relevant Orders    Vitamin D 25 Hydroxy    Chronic pain syndrome    Relevant Medications    traMADol (ULTRAM) 50 MG tablet    Morbid obesity with BMI of 45.0-49.9, adult (HCC)    Relevant Medications    metFORMIN (GLUCOPHAGE) 500 MG tablet    Screening for breast cancer    Relevant Orders    ROSENDO DIGITAL SCREEN W CAD BILATERAL    Class 3 severe obesity due to excess calories with serious comorbidity and body mass index (BMI) of 45.0 to 49.9 in adult [facility])    Relevant Medications    metFORMIN (GLUCOPHAGE) 500 MG tablet       see above discussion regarding bw results  Discussed need for diet and exercise  Discussed complications assoc with obesity such as cad, cva, mi, etc. Recommended referral to nutritionist.  Order placed for fasting bw to do prior to next ov 11/6  Orders given for mammogram and dexa scan  Start symbicort bid--rinse mouth out after each use, refilled proair inhaler, medrol dose pack given, call in 1 week if no

## 2019-09-09 DIAGNOSIS — J45.41 MODERATE PERSISTENT ASTHMA WITH ACUTE EXACERBATION: Primary | ICD-10-CM

## 2019-09-09 RX ORDER — ALBUTEROL SULFATE 90 UG/1
2 AEROSOL, METERED RESPIRATORY (INHALATION) 4 TIMES DAILY PRN
Qty: 3 INHALER | Refills: 1 | Status: SHIPPED
Start: 2019-09-09 | End: 2020-03-11

## 2019-09-19 ENCOUNTER — TELEPHONE (OUTPATIENT)
Dept: ADMINISTRATIVE | Age: 69
End: 2019-09-19

## 2019-09-29 PROBLEM — Z12.39 SCREENING FOR BREAST CANCER: Status: RESOLVED | Noted: 2019-08-30 | Resolved: 2019-09-29

## 2019-09-30 DIAGNOSIS — M15.0 PRIMARY GENERALIZED (OSTEO)ARTHRITIS: Primary | ICD-10-CM

## 2019-09-30 RX ORDER — TRAMADOL HYDROCHLORIDE 50 MG/1
50 TABLET ORAL 3 TIMES DAILY PRN
COMMUNITY
End: 2019-09-30 | Stop reason: SDUPTHER

## 2019-09-30 RX ORDER — TRAMADOL HYDROCHLORIDE 50 MG/1
50 TABLET ORAL 3 TIMES DAILY PRN
Qty: 90 TABLET | Refills: 0 | Status: SHIPPED | OUTPATIENT
Start: 2019-09-30 | End: 2019-10-30

## 2019-10-21 DIAGNOSIS — M81.0 AGE-RELATED OSTEOPOROSIS WITHOUT CURRENT PATHOLOGICAL FRACTURE: ICD-10-CM

## 2019-10-21 RX ORDER — ALENDRONATE SODIUM 70 MG/1
70 TABLET ORAL
Qty: 10 TABLET | Refills: 2 | OUTPATIENT
Start: 2019-10-21

## 2019-10-21 RX ORDER — ALENDRONATE SODIUM 70 MG/1
TABLET ORAL
Qty: 12 TABLET | Refills: 1 | Status: SHIPPED
Start: 2019-10-21 | End: 2020-03-11 | Stop reason: SDUPTHER

## 2019-10-31 ENCOUNTER — TELEPHONE (OUTPATIENT)
Dept: PHARMACY | Facility: CLINIC | Age: 69
End: 2019-10-31

## 2019-11-06 ENCOUNTER — OFFICE VISIT (OUTPATIENT)
Dept: PRIMARY CARE CLINIC | Age: 69
End: 2019-11-06
Payer: COMMERCIAL

## 2019-11-06 VITALS
WEIGHT: 253 LBS | BODY MASS INDEX: 46.56 KG/M2 | DIASTOLIC BLOOD PRESSURE: 78 MMHG | HEART RATE: 72 BPM | OXYGEN SATURATION: 96 % | SYSTOLIC BLOOD PRESSURE: 136 MMHG | TEMPERATURE: 98.7 F | HEIGHT: 62 IN

## 2019-11-06 DIAGNOSIS — G89.4 CHRONIC PAIN SYNDROME: ICD-10-CM

## 2019-11-06 DIAGNOSIS — J30.1 SEASONAL ALLERGIC RHINITIS DUE TO POLLEN: ICD-10-CM

## 2019-11-06 DIAGNOSIS — I87.2 VENOUS INSUFFICIENCY: ICD-10-CM

## 2019-11-06 DIAGNOSIS — I82.599 CHRONIC DEEP VEIN THROMBOSIS (DVT) OF OTHER VEIN OF LOWER EXTREMITY, UNSPECIFIED LATERALITY (HCC): ICD-10-CM

## 2019-11-06 DIAGNOSIS — E55.9 VITAMIN D DEFICIENCY: ICD-10-CM

## 2019-11-06 DIAGNOSIS — M81.0 AGE-RELATED OSTEOPOROSIS WITHOUT CURRENT PATHOLOGICAL FRACTURE: ICD-10-CM

## 2019-11-06 DIAGNOSIS — E78.2 MIXED HYPERLIPIDEMIA: Primary | ICD-10-CM

## 2019-11-06 DIAGNOSIS — I50.32 CHRONIC DIASTOLIC (CONGESTIVE) HEART FAILURE (HCC): ICD-10-CM

## 2019-11-06 DIAGNOSIS — E11.8 TYPE 2 DIABETES MELLITUS WITH COMPLICATION, WITHOUT LONG-TERM CURRENT USE OF INSULIN (HCC): ICD-10-CM

## 2019-11-06 DIAGNOSIS — E03.8 OTHER SPECIFIED HYPOTHYROIDISM: ICD-10-CM

## 2019-11-06 DIAGNOSIS — I44.7 LBBB (LEFT BUNDLE BRANCH BLOCK): ICD-10-CM

## 2019-11-06 DIAGNOSIS — J01.80 ACUTE NON-RECURRENT SINUSITIS OF OTHER SINUS: ICD-10-CM

## 2019-11-06 DIAGNOSIS — M15.0 PRIMARY GENERALIZED (OSTEO)ARTHRITIS: ICD-10-CM

## 2019-11-06 DIAGNOSIS — F41.1 GENERALIZED ANXIETY DISORDER: ICD-10-CM

## 2019-11-06 DIAGNOSIS — E66.01 CLASS 3 SEVERE OBESITY DUE TO EXCESS CALORIES WITH SERIOUS COMORBIDITY AND BODY MASS INDEX (BMI) OF 45.0 TO 49.9 IN ADULT (HCC): ICD-10-CM

## 2019-11-06 DIAGNOSIS — Z23 ENCOUNTER FOR IMMUNIZATION: ICD-10-CM

## 2019-11-06 DIAGNOSIS — I10 ESSENTIAL HYPERTENSION: ICD-10-CM

## 2019-11-06 DIAGNOSIS — E66.01 MORBID OBESITY WITH BMI OF 45.0-49.9, ADULT (HCC): ICD-10-CM

## 2019-11-06 DIAGNOSIS — J45.41 MODERATE PERSISTENT ASTHMA WITH ACUTE EXACERBATION: ICD-10-CM

## 2019-11-06 DIAGNOSIS — Z86.711 PERSONAL HISTORY OF PULMONARY EMBOLISM: ICD-10-CM

## 2019-11-06 PROBLEM — J01.90 ACUTE INFECTION OF NASAL SINUS: Status: ACTIVE | Noted: 2019-11-06

## 2019-11-06 PROCEDURE — 99214 OFFICE O/P EST MOD 30 MIN: CPT | Performed by: FAMILY MEDICINE

## 2019-11-06 RX ORDER — DOXYCYCLINE HYCLATE 100 MG
100 TABLET ORAL 2 TIMES DAILY
Qty: 20 TABLET | Refills: 0 | Status: SHIPPED | OUTPATIENT
Start: 2019-11-06 | End: 2019-11-16

## 2019-11-06 RX ORDER — POTASSIUM CHLORIDE 750 MG/1
TABLET, FILM COATED, EXTENDED RELEASE ORAL
COMMUNITY
Start: 2019-10-15 | End: 2020-03-11 | Stop reason: SDUPTHER

## 2019-11-06 RX ORDER — LOVASTATIN 40 MG/1
40 TABLET ORAL NIGHTLY
Qty: 90 TABLET | Refills: 1 | Status: SHIPPED | OUTPATIENT
Start: 2019-11-06 | End: 2020-02-04 | Stop reason: SDUPTHER

## 2019-11-06 RX ORDER — METHYLPREDNISOLONE 4 MG/1
TABLET ORAL
Qty: 21 TABLET | Refills: 0 | Status: SHIPPED | OUTPATIENT
Start: 2019-11-06 | End: 2019-11-12

## 2019-11-06 RX ORDER — TRAMADOL HYDROCHLORIDE 50 MG/1
50 TABLET ORAL EVERY 8 HOURS PRN
Qty: 90 TABLET | Refills: 0 | Status: SHIPPED | OUTPATIENT
Start: 2019-11-06 | End: 2019-12-06

## 2019-11-06 RX ORDER — TRAMADOL HYDROCHLORIDE 50 MG/1
TABLET ORAL
COMMUNITY
End: 2019-11-06 | Stop reason: SDUPTHER

## 2019-11-06 ASSESSMENT — ENCOUNTER SYMPTOMS
SINUS PAIN: 1
COLOR CHANGE: 0
EYE ITCHING: 0
WHEEZING: 0
ABDOMINAL PAIN: 1
VOMITING: 0
EYE REDNESS: 0
NAUSEA: 0
ABDOMINAL DISTENTION: 0
EYE PAIN: 0
COUGH: 1
CONSTIPATION: 0
PHOTOPHOBIA: 0
BLOOD IN STOOL: 0
BACK PAIN: 1
SINUS PRESSURE: 1
SORE THROAT: 0
CHEST TIGHTNESS: 0
DIARRHEA: 0

## 2019-12-11 ENCOUNTER — TELEPHONE (OUTPATIENT)
Dept: PRIMARY CARE CLINIC | Age: 69
End: 2019-12-11

## 2019-12-23 DIAGNOSIS — G89.4 CHRONIC PAIN SYNDROME: Primary | ICD-10-CM

## 2019-12-23 RX ORDER — TRAMADOL HYDROCHLORIDE 50 MG/1
50 TABLET ORAL 3 TIMES DAILY
Qty: 90 TABLET | Refills: 0 | Status: SHIPPED
Start: 2019-12-23 | End: 2020-02-13 | Stop reason: SDUPTHER

## 2019-12-23 RX ORDER — TRAMADOL HYDROCHLORIDE 50 MG/1
50 TABLET ORAL EVERY 6 HOURS PRN
COMMUNITY
End: 2019-12-23 | Stop reason: SDUPTHER

## 2020-02-04 RX ORDER — LOVASTATIN 40 MG/1
40 TABLET ORAL NIGHTLY
Qty: 90 TABLET | Refills: 1 | Status: SHIPPED | OUTPATIENT
Start: 2020-02-04 | End: 2020-08-03 | Stop reason: SDUPTHER

## 2020-02-14 RX ORDER — TRAMADOL HYDROCHLORIDE 50 MG/1
50 TABLET ORAL 3 TIMES DAILY
Qty: 90 TABLET | Refills: 0 | Status: SHIPPED
Start: 2020-02-14 | End: 2020-03-11 | Stop reason: SDUPTHER

## 2020-03-09 RX ORDER — HYDRALAZINE HYDROCHLORIDE 50 MG/1
50 TABLET, FILM COATED ORAL 2 TIMES DAILY
Qty: 180 TABLET | Refills: 1 | Status: ON HOLD | OUTPATIENT
Start: 2020-03-09 | End: 2021-03-05 | Stop reason: HOSPADM

## 2020-03-09 NOTE — TELEPHONE ENCOUNTER
I didn't know if you saw the note about blood work that I posted at the bottom of the refill note. She has orders in our system from another Dr. And she has an appt with you on 3/11/20. She wanted to know if you wanted to order labs before her visit here. I looked and see the future orders from you are dated for June. I told her I would check with you and let her know.

## 2020-03-11 ENCOUNTER — OFFICE VISIT (OUTPATIENT)
Dept: PRIMARY CARE CLINIC | Age: 70
End: 2020-03-11
Payer: MEDICARE

## 2020-03-11 VITALS
WEIGHT: 251 LBS | DIASTOLIC BLOOD PRESSURE: 72 MMHG | TEMPERATURE: 97.9 F | BODY MASS INDEX: 46.19 KG/M2 | HEART RATE: 74 BPM | HEIGHT: 62 IN | OXYGEN SATURATION: 96 % | SYSTOLIC BLOOD PRESSURE: 120 MMHG

## 2020-03-11 PROBLEM — D68.2 FACTOR V DEFICIENCY (HCC): Status: ACTIVE | Noted: 2020-03-11

## 2020-03-11 PROCEDURE — 1090F PRES/ABSN URINE INCON ASSESS: CPT | Performed by: FAMILY MEDICINE

## 2020-03-11 PROCEDURE — G8399 PT W/DXA RESULTS DOCUMENT: HCPCS | Performed by: FAMILY MEDICINE

## 2020-03-11 PROCEDURE — 3017F COLORECTAL CA SCREEN DOC REV: CPT | Performed by: FAMILY MEDICINE

## 2020-03-11 PROCEDURE — G8417 CALC BMI ABV UP PARAM F/U: HCPCS | Performed by: FAMILY MEDICINE

## 2020-03-11 PROCEDURE — 1123F ACP DISCUSS/DSCN MKR DOCD: CPT | Performed by: FAMILY MEDICINE

## 2020-03-11 PROCEDURE — G8427 DOCREV CUR MEDS BY ELIG CLIN: HCPCS | Performed by: FAMILY MEDICINE

## 2020-03-11 PROCEDURE — 4040F PNEUMOC VAC/ADMIN/RCVD: CPT | Performed by: FAMILY MEDICINE

## 2020-03-11 PROCEDURE — 3046F HEMOGLOBIN A1C LEVEL >9.0%: CPT | Performed by: FAMILY MEDICINE

## 2020-03-11 PROCEDURE — 1036F TOBACCO NON-USER: CPT | Performed by: FAMILY MEDICINE

## 2020-03-11 PROCEDURE — 99214 OFFICE O/P EST MOD 30 MIN: CPT | Performed by: FAMILY MEDICINE

## 2020-03-11 PROCEDURE — G8484 FLU IMMUNIZE NO ADMIN: HCPCS | Performed by: FAMILY MEDICINE

## 2020-03-11 PROCEDURE — 2022F DILAT RTA XM EVC RTNOPTHY: CPT | Performed by: FAMILY MEDICINE

## 2020-03-11 RX ORDER — BUDESONIDE AND FORMOTEROL FUMARATE DIHYDRATE 80; 4.5 UG/1; UG/1
2 AEROSOL RESPIRATORY (INHALATION) 2 TIMES DAILY
Qty: 3 INHALER | Refills: 1 | Status: SHIPPED | OUTPATIENT
Start: 2020-03-11 | End: 2021-10-08 | Stop reason: ALTCHOICE

## 2020-03-11 RX ORDER — ALENDRONATE SODIUM 70 MG/1
TABLET ORAL
Qty: 12 TABLET | Refills: 1 | Status: SHIPPED | OUTPATIENT
Start: 2020-03-11 | End: 2020-05-11

## 2020-03-11 RX ORDER — ASCORBIC ACID 500 MG
500 TABLET ORAL DAILY
COMMUNITY
End: 2020-06-25

## 2020-03-11 RX ORDER — TRAMADOL HYDROCHLORIDE 50 MG/1
50 TABLET ORAL 2 TIMES DAILY
Qty: 60 TABLET | Refills: 0 | Status: SHIPPED | OUTPATIENT
Start: 2020-03-11 | End: 2020-04-10

## 2020-03-11 RX ORDER — POTASSIUM CHLORIDE 750 MG/1
10 TABLET, FILM COATED, EXTENDED RELEASE ORAL EVERY OTHER DAY
Qty: 45 TABLET | Refills: 1 | Status: ON HOLD | OUTPATIENT
Start: 2020-03-11 | End: 2021-03-11 | Stop reason: HOSPADM

## 2020-03-11 RX ORDER — FUROSEMIDE 20 MG/1
20 TABLET ORAL DAILY PRN
Status: ON HOLD | COMMUNITY
End: 2021-03-05 | Stop reason: HOSPADM

## 2020-03-11 ASSESSMENT — ENCOUNTER SYMPTOMS
CONSTIPATION: 0
VOMITING: 0
ABDOMINAL DISTENTION: 0
SINUS PAIN: 0
SINUS PRESSURE: 0
BACK PAIN: 1
CHEST TIGHTNESS: 0
EYE PAIN: 0
BLOOD IN STOOL: 0
WHEEZING: 0
COLOR CHANGE: 0
EYE REDNESS: 0
SORE THROAT: 0
EYE ITCHING: 0
DIARRHEA: 0
ABDOMINAL PAIN: 1
NAUSEA: 0
PHOTOPHOBIA: 0

## 2020-03-11 ASSESSMENT — PATIENT HEALTH QUESTIONNAIRE - PHQ9
1. LITTLE INTEREST OR PLEASURE IN DOING THINGS: 0
2. FEELING DOWN, DEPRESSED OR HOPELESS: 0
SUM OF ALL RESPONSES TO PHQ QUESTIONS 1-9: 0
SUM OF ALL RESPONSES TO PHQ QUESTIONS 1-9: 0
SUM OF ALL RESPONSES TO PHQ9 QUESTIONS 1 & 2: 0

## 2020-03-11 NOTE — PROGRESS NOTES
[facility]) Physicians   Internal Medicine     3/11/2020  Peggye Krabbe : 1950 Sex: female  Age:76 y.o. Chief Complaint   Patient presents with    Skin Problem    Medication Refill    Drainage     throat clearing x 2 months after a bad cold        Hypertension   Pertinent negatives include no chest pain, headaches, neck pain or palpitations. Patient presents for check up and to review recent bw results, multiple medical problems. This is a patient with a history of ventral hernia repair (3/2017). She had several complications including dehiscence of  the wound, needing a wound vac, and also a PE and bilateral DVTs. She has been on Coumadin since that time. She  does have a chronic appearing left lower extremity DVT. She was evaluated by vascular, was recommended to lose  weight and continue compression stockings, patient saw hematologist at [facility] , bw was done which revealed  factor 5 leiden deficiency. Currently taking eliquis daily. Reviewed us performed of lower ext 2019--occlusive thrombus in the proximal left superficial femoral vein, non occlusive thrombus in mid-distal superficial femoral vein. Reviewed mammogram from 2019--results normal,  Reviewed dexa scan from 2019--osteopenia of spine, osteoporosis of hip--improvement noted when compared to previous, recommend cont same, cont to monitor    h/o b/l knee pain and oa, takes tramadol bid-tid for pain control, needs refill today, oarrs reviewed, no drug seekingbehavior  last bw results from 2019, all good except hgba1c has increased from 6.9 to 8.0--patient states went off of weight watchers and has not been watching her diet, started weight watchers within last month, so at this time does not want to adjust medication. I did tell patient if next bw does not improved, will need to adjust medication. Due for fasting bw--order in.       Review of Systems   Constitutional: Negative for appetite change, fatigue, heart sounds. Comments: 1/6 systolic murmur  Pulmonary:      Effort: Pulmonary effort is normal.   Abdominal:      General: Bowel sounds are normal.      Palpations: Abdomen is soft. Musculoskeletal: Normal range of motion. Comments: Left lower extremity edema present which is chronic, pulses intact  Walks with a labored, limping and slow gait, uses a cane to ambulate ambulate  Heberden's nodes noted bilateral index fingers, right 5th finger forward flex the PIP, tenderness to palpation of her left lateral lower and anterior rib cage  Very poor posture noted with grinding/crepitation with flexion and extension of bilateral knees  Compression stockings in place b/l lower ext   Skin:     General: Skin is warm and dry. Findings: No rash. Neurological:      Mental Status: She is alert and oriented to person, place, and time.          Assessment and Plan:    Problem List        Circulatory    Essential hypertension - Primary    Relevant Medications    potassium chloride (KLOR-CON) 10 MEQ extended release tablet    LBBB (left bundle branch block)    Chronic deep vein thrombosis (DVT) of lower extremity (HCC)    Relevant Medications    apixaban (ELIQUIS) 5 MG TABS tablet    Chronic diastolic (congestive) heart failure (HCC)    Venous insufficiency       Respiratory    Seasonal allergic rhinitis due to pollen    Relevant Medications    budesonide-formoterol (SYMBICORT) 80-4.5 MCG/ACT AERO    PROAIR  (90 Base) MCG/ACT inhaler    Moderate persistent asthma with acute exacerbation    Relevant Medications    budesonide-formoterol (SYMBICORT) 80-4.5 MCG/ACT AERO    PROAIR  (90 Base) MCG/ACT inhaler       Endocrine    Type 2 diabetes mellitus with complication, without long-term current use of insulin (HCC)    Relevant Medications    metFORMIN (GLUCOPHAGE) 500 MG tablet    Other specified hypothyroidism       Musculoskeletal and Integument    Age-related osteoporosis without current pathological fracture    Relevant Medications    alendronate (FOSAMAX) 70 MG tablet    Primary generalized (osteo)arthritis    Relevant Medications    traMADol (ULTRAM) 50 MG tablet       Hematopoietic and Hemostatic    Factor V deficiency (HCC)       Other    Mixed hyperlipidemia    Relevant Medications    carvedilol (COREG) 25 MG tablet    lovastatin (MEVACOR) 40 MG tablet    apixaban (ELIQUIS) 5 MG TABS tablet    hydrALAZINE (APRESOLINE) 50 MG tablet    furosemide (LASIX) 20 MG tablet    Personal history of pulmonary embolism    Vitamin D deficiency    Chronic pain syndrome    Relevant Medications    traMADol (ULTRAM) 50 MG tablet    Morbid obesity with BMI of 45.0-49.9, adult (HCC)    Relevant Medications    metFORMIN (GLUCOPHAGE) 500 MG tablet    Class 3 severe obesity due to excess calories with serious comorbidity and body mass index (BMI) of 45.0 to 49.9 in adult [facility])    Relevant Medications    metFORMIN (GLUCOPHAGE) 500 MG tablet      bp well controlled, cont same  Cont to follow with hematologist  order in for fasting bw  Reviewed mammogram from 12/2019  Reviewed dexa scan from 12/2019  Discussed need for diet and exercise  Discussed complications assoc with obesity such as cad, cva, mi, etc. Recommended referral to nutritionist.  utd colonoscopy 9/2018  Tramadol refilled, oarrs reviewed, no drug seeking behavior    Return in about 3 months (around 6/11/2020). No orders of the defined types were placed in this encounter.       Penny Stephenson MD  3/11/2020  10:02 AM

## 2020-05-01 RX ORDER — TRAMADOL HYDROCHLORIDE 50 MG/1
50 TABLET ORAL 2 TIMES DAILY
COMMUNITY
End: 2020-05-01 | Stop reason: SDUPTHER

## 2020-05-01 RX ORDER — TRAMADOL HYDROCHLORIDE 50 MG/1
50 TABLET ORAL 2 TIMES DAILY
Qty: 60 TABLET | Refills: 0 | Status: SHIPPED | OUTPATIENT
Start: 2020-05-01 | End: 2020-05-31

## 2020-05-11 RX ORDER — ALENDRONATE SODIUM 70 MG/1
TABLET ORAL
Qty: 12 TABLET | Refills: 0 | Status: SHIPPED
Start: 2020-05-11 | End: 2020-06-08

## 2020-05-26 ENCOUNTER — HOSPITAL ENCOUNTER (OUTPATIENT)
Age: 70
Discharge: HOME | End: 2020-05-28
Payer: MEDICARE

## 2020-05-26 LAB
ALBUMIN SERPL-MCNC: 4.1 G/DL (ref 3.5–5.2)
ALP BLD-CCNC: 53 U/L (ref 35–104)
ALT SERPL-CCNC: 18 U/L (ref 0–32)
ANION GAP SERPL CALCULATED.3IONS-SCNC: 10 MMOL/L (ref 7–16)
AST SERPL-CCNC: 60 U/L (ref 0–31)
BASOPHILS ABSOLUTE: 0.06 E9/L (ref 0–0.2)
BASOPHILS RELATIVE PERCENT: 0.8 % (ref 0–2)
BILIRUB SERPL-MCNC: 0.7 MG/DL (ref 0–1.2)
BILIRUBIN URINE: NEGATIVE
BLOOD, URINE: NEGATIVE
BUN BLDV-MCNC: 16 MG/DL (ref 8–23)
CALCIUM SERPL-MCNC: 9.5 MG/DL (ref 8.6–10.2)
CHLORIDE BLD-SCNC: 102 MMOL/L (ref 98–107)
CHOLESTEROL, TOTAL: 129 MG/DL (ref 0–199)
CLARITY: CLEAR
CO2: 29 MMOL/L (ref 22–29)
COLOR: YELLOW
CREAT SERPL-MCNC: 1 MG/DL (ref 0.5–1)
CREATININE URINE: 119 MG/DL (ref 29–226)
EOSINOPHILS ABSOLUTE: 0.18 E9/L (ref 0.05–0.5)
EOSINOPHILS RELATIVE PERCENT: 2.3 % (ref 0–6)
GFR AFRICAN AMERICAN: >60
GFR NON-AFRICAN AMERICAN: 55 ML/MIN/1.73
GLUCOSE BLD-MCNC: 264 MG/DL (ref 74–99)
GLUCOSE URINE: >=1000 MG/DL
HBA1C MFR BLD: 10.1 % (ref 4–5.6)
HCT VFR BLD CALC: 38.6 % (ref 34–48)
HDLC SERPL-MCNC: 53 MG/DL
HEMOGLOBIN: 11.8 G/DL (ref 11.5–15.5)
IMMATURE GRANULOCYTES #: 0.02 E9/L
IMMATURE GRANULOCYTES %: 0.3 % (ref 0–5)
KETONES, URINE: NEGATIVE MG/DL
LDL CHOLESTEROL CALCULATED: 57 MG/DL (ref 0–99)
LEUKOCYTE ESTERASE, URINE: NEGATIVE
LYMPHOCYTES ABSOLUTE: 2.05 E9/L (ref 1.5–4)
LYMPHOCYTES RELATIVE PERCENT: 26.6 % (ref 20–42)
MCH RBC QN AUTO: 27.3 PG (ref 26–35)
MCHC RBC AUTO-ENTMCNC: 30.6 % (ref 32–34.5)
MCV RBC AUTO: 89.4 FL (ref 80–99.9)
MICROALBUMIN UR-MCNC: 37.1 MG/L
MICROALBUMIN/CREAT UR-RTO: 31.2 (ref 0–30)
MONOCYTES ABSOLUTE: 0.68 E9/L (ref 0.1–0.95)
MONOCYTES RELATIVE PERCENT: 8.8 % (ref 2–12)
NEUTROPHILS ABSOLUTE: 4.71 E9/L (ref 1.8–7.3)
NEUTROPHILS RELATIVE PERCENT: 61.2 % (ref 43–80)
NITRITE, URINE: NEGATIVE
PDW BLD-RTO: 11.9 FL (ref 11.5–15)
PH UA: 5 (ref 5–9)
PLATELET # BLD: 207 E9/L (ref 130–450)
PMV BLD AUTO: 10.6 FL (ref 7–12)
POTASSIUM SERPL-SCNC: 5.4 MMOL/L (ref 3.5–5)
PROTEIN UA: NEGATIVE MG/DL
RBC # BLD: 4.32 E12/L (ref 3.5–5.5)
SODIUM BLD-SCNC: 141 MMOL/L (ref 132–146)
SPECIFIC GRAVITY UA: 1.02 (ref 1–1.03)
T4 FREE: 1.18 NG/DL (ref 0.93–1.7)
TOTAL PROTEIN: 8.2 G/DL (ref 6.4–8.3)
TRIGL SERPL-MCNC: 97 MG/DL (ref 0–149)
TSH SERPL DL<=0.05 MIU/L-ACNC: 3.54 UIU/ML (ref 0.27–4.2)
UROBILINOGEN, URINE: 0.2 E.U./DL
VITAMIN D 25-HYDROXY: 40 NG/ML (ref 30–100)
VLDLC SERPL CALC-MCNC: 19 MG/DL
WBC # BLD: 7.7 E9/L (ref 4.5–11.5)

## 2020-05-26 PROCEDURE — 80061 LIPID PANEL: CPT

## 2020-05-26 PROCEDURE — 82570 ASSAY OF URINE CREATININE: CPT

## 2020-05-26 PROCEDURE — 84443 ASSAY THYROID STIM HORMONE: CPT

## 2020-05-26 PROCEDURE — 85025 COMPLETE CBC W/AUTO DIFF WBC: CPT

## 2020-05-26 PROCEDURE — 84439 ASSAY OF FREE THYROXINE: CPT

## 2020-05-26 PROCEDURE — 81003 URINALYSIS AUTO W/O SCOPE: CPT

## 2020-05-26 PROCEDURE — 36415 COLL VENOUS BLD VENIPUNCTURE: CPT

## 2020-05-26 PROCEDURE — 82306 VITAMIN D 25 HYDROXY: CPT

## 2020-05-26 PROCEDURE — 80053 COMPREHEN METABOLIC PANEL: CPT

## 2020-05-26 PROCEDURE — 83036 HEMOGLOBIN GLYCOSYLATED A1C: CPT

## 2020-05-26 PROCEDURE — 82044 UR ALBUMIN SEMIQUANTITATIVE: CPT

## 2020-06-02 RX ORDER — TRAMADOL HYDROCHLORIDE 50 MG/1
50 TABLET ORAL EVERY 6 HOURS PRN
COMMUNITY
End: 2020-06-02 | Stop reason: SDUPTHER

## 2020-06-03 RX ORDER — TRAMADOL HYDROCHLORIDE 50 MG/1
50 TABLET ORAL EVERY 6 HOURS PRN
Qty: 90 TABLET | Refills: 0 | Status: SHIPPED | OUTPATIENT
Start: 2020-06-03 | End: 2020-07-03

## 2020-06-08 RX ORDER — ALENDRONATE SODIUM 70 MG/1
TABLET ORAL
Qty: 12 TABLET | Refills: 0 | Status: SHIPPED
Start: 2020-06-08 | End: 2021-10-08

## 2020-06-12 ENCOUNTER — HOSPITAL ENCOUNTER (OUTPATIENT)
Dept: INFUSION THERAPY | Age: 70
End: 2020-06-12
Payer: MEDICARE

## 2020-06-15 RX ORDER — CARVEDILOL 25 MG/1
TABLET ORAL
Qty: 180 TABLET | Refills: 0 | Status: SHIPPED
Start: 2020-06-15 | End: 2020-06-25 | Stop reason: SDUPTHER

## 2020-06-23 ENCOUNTER — OFFICE VISIT (OUTPATIENT)
Dept: ONCOLOGY | Age: 70
End: 2020-06-23
Payer: MEDICARE

## 2020-06-23 ENCOUNTER — HOSPITAL ENCOUNTER (OUTPATIENT)
Dept: INFUSION THERAPY | Age: 70
Discharge: HOME | End: 2020-06-23
Payer: MEDICARE

## 2020-06-23 VITALS
WEIGHT: 249.4 LBS | DIASTOLIC BLOOD PRESSURE: 78 MMHG | OXYGEN SATURATION: 96 % | SYSTOLIC BLOOD PRESSURE: 112 MMHG | HEART RATE: 91 BPM | BODY MASS INDEX: 45.62 KG/M2 | TEMPERATURE: 98.9 F

## 2020-06-23 PROCEDURE — 99212 OFFICE O/P EST SF 10 MIN: CPT

## 2020-06-23 PROCEDURE — 99212 OFFICE O/P EST SF 10 MIN: CPT | Performed by: INTERNAL MEDICINE

## 2020-06-23 PROCEDURE — 36415 COLL VENOUS BLD VENIPUNCTURE: CPT

## 2020-06-25 ENCOUNTER — OFFICE VISIT (OUTPATIENT)
Dept: CARDIOLOGY CLINIC | Age: 70
End: 2020-06-25
Payer: MEDICARE

## 2020-06-25 VITALS
WEIGHT: 248.8 LBS | HEART RATE: 100 BPM | SYSTOLIC BLOOD PRESSURE: 146 MMHG | DIASTOLIC BLOOD PRESSURE: 82 MMHG | BODY MASS INDEX: 45.78 KG/M2 | HEIGHT: 62 IN | RESPIRATION RATE: 16 BRPM

## 2020-06-25 PROCEDURE — 99213 OFFICE O/P EST LOW 20 MIN: CPT | Performed by: INTERNAL MEDICINE

## 2020-06-25 PROCEDURE — 93000 ELECTROCARDIOGRAM COMPLETE: CPT | Performed by: INTERNAL MEDICINE

## 2020-06-25 RX ORDER — CARVEDILOL 25 MG/1
TABLET ORAL
Qty: 180 TABLET | Refills: 3 | Status: ON HOLD
Start: 2020-06-25 | End: 2021-03-05 | Stop reason: HOSPADM

## 2020-06-25 NOTE — PROGRESS NOTES
normal estimated at 50%\". Mild ERMIAS. 19. Ultrasound, 06/23/2016, [facility], \"positive for deep vein thrombosis\". 20. Limited echo, 07/20/2016. EF 55-65%  Mild CLVH. Stage II diastolic dysfunction. Mild LA dilatation. 21. ER evaluation, 03/22/2016. Facial swelling. Lisinopril stopped, 03/2016.   22. Follow up with Dr. Roc Mak, 2017. 23. Echo 07/31/2018: Essentially normal  24. Outpatient cardiac assessment 06/25/2020: Noncompliant with Coreg. Plan for reinstitution beta-blocker. Echo ordered        Review of Systems:  Constitutional: negative for fever and chills  Respiratory: negative for cough and hemoptysis  Cardiovascular:   Gastrointestinal: negative for abdominal pain, diarrhea, nausea and vomiting  Genitourinary:negative for dysuria and hematuria  Derm: negative for rash and skin lesion(s)  Neurological: negative for seizures and tremors  Endocrine: negative for diabetic symptoms including polydipsia and polyuria  Musculoskeletal: negative for CTD  Psychiatric: negative for psychosis and major depression    On examination, she is an alert pleasant obese middle-aged  female in no distress   skin is warm and dry. Respirations are unlabored. BP (!) 146/82   Pulse 100   Resp 16   Ht 5' 2\" (1.575 m)   Wt 248 lb 12.8 oz (112.9 kg)   BMI 45.51 kg/m² . HEENT negative for scleral icterus. Extraocular muscles intact. No facial asymmetry or central cyanosis. Neck without masses or goiter. No bruit or JVD. Cardiac apex not displaced. Rhythm regular. Abdomen normal.  Extremities without edema. Lungs are clear    EKG today shows sinus tachycardia 100/min. LBBB (old)    The patient will benefit by reinstitution of beta-blocker. She will be asked to take carvedilol 12.5 mg twice per day for 7 to 10 days and then increase the dose to 25 mg p.o. twice daily. This should be well-tolerated as she is currently hypertensive.   An echo will be rechecked for ventricular

## 2020-07-17 RX ORDER — TRAMADOL HYDROCHLORIDE 50 MG/1
50 TABLET ORAL EVERY 6 HOURS PRN
Qty: 120 TABLET | Refills: 0 | Status: SHIPPED | OUTPATIENT
Start: 2020-07-17 | End: 2020-08-16

## 2020-07-17 RX ORDER — TRAMADOL HYDROCHLORIDE 50 MG/1
50 TABLET ORAL EVERY 6 HOURS PRN
COMMUNITY
End: 2020-07-17 | Stop reason: SDUPTHER

## 2020-07-17 NOTE — TELEPHONE ENCOUNTER
Last Appointment:  3/11/2020  Future Appointments   Date Time Provider Ericka Weems   7/23/2020  9:20 AM Pramod Love MD [address] [facility]   9/11/2020  1:00 PM Elmer Smith MD [facility]   9/11/2020  1:30 PM Elmer Smith MD [facility]   9/22/2020  9:30 AM [facility] ONC FAST TRACK 1 [facility] Med Onc [facility]   9/22/2020  9:45 AM Ivette Beach MD MED ONC [facility]      Patient needs pended med refilled.     Electronically signed by Jluis Gonzales LPN on 0/99/1513 at 4:01 PM

## 2020-07-23 ENCOUNTER — OFFICE VISIT (OUTPATIENT)
Dept: CARDIOLOGY CLINIC | Age: 70
End: 2020-07-23
Payer: MEDICARE

## 2020-07-23 VITALS
SYSTOLIC BLOOD PRESSURE: 138 MMHG | HEIGHT: 62 IN | WEIGHT: 250.9 LBS | TEMPERATURE: 97.3 F | DIASTOLIC BLOOD PRESSURE: 60 MMHG | HEART RATE: 76 BPM | BODY MASS INDEX: 46.17 KG/M2

## 2020-07-23 PROCEDURE — 99213 OFFICE O/P EST LOW 20 MIN: CPT | Performed by: INTERNAL MEDICINE

## 2020-07-23 PROCEDURE — 93000 ELECTROCARDIOGRAM COMPLETE: CPT | Performed by: INTERNAL MEDICINE

## 2020-07-23 NOTE — PROGRESS NOTES
[facility]  Sharda Santana. Anderson Phillips M.D. Amado Troy M.D.  Sammi Novoa. [employer], Dearl CRISTINO Higuera. Chava Miles M.D. MD Tom Roe Mauriameena Banuelos   1950  Mere Casas MD      This 60-year-old woman is seen today for outpatient cardiac follow-up She has a history of DVT and is chronically anticoagulated due to a history of factor V Leiden deficiency. She has a history of dilated LV and mildly depressed ejection fracti aortic valve stenosis. In  she stopped beta-blocker as the prescription ran out. Coreg was restarted at that time. Today she reports feeling well. She has exertional dyspnea with carrying in groceries but she reports that this is her baseline. She has no chest pain orthopnea or PND      Medical History:  1. Obesity. BMI  BMI 45.51 2020  2. IKE () and appendectomy.    3. Cholecystectomy, abdominal hernia repair and colon perforation (). Subsequent takedown of colostomy.    4. Chronic LBBB (). 5. Hypertension. 6. Seasonal allergies. 7. Hyperlipidemia. 8. Cigarette abuse, 40 pack years. Quit in . 9. Allergy to erythromycin. 10. No history MI, CHF, COPD or stroke. 11. Family history positive. Her father  of diabetes complications/renal failure. 12. [facility] stress MPS, 2009. Normal perfusion, diffuse hypokinesis, EF 38%. 13. Echo, 2009. Global hypokinesis, estimated EF 40-45%. Stage I diastolic dysfunction, LA 3.5. No pericardial effusion (Dr. Clarence Singh). 14. Sleep study, 2009.    15. Echo, 2011. LA 5.2. Mild LVEDD. Mild global hypokinesis. E/A 1.18 and E/E' 16.4. RVSP normal. EF 45%. Mild AS with mean gradient 9.  16. Limited echo, 2014. AURORA = 36 (moderate dilatation). E/A 1.29. E/E' 19. LA AP diameter 5.0.   17. Hernia repair, [facility], 2016.  Reports unavailable.  Possible DVT. 18. Echocardiogram, [facility], 2016.  \"Moderate septal hypertrophy\".  Estimated ejection fraction, \"lower limits of normal estimated at 50%\".  Mild ERMIAS. 19. Ultrasound, 06/23/2016, [facility], \"positive for deep vein thrombosis\". 20. Limited echo, 07/20/2016.  EF 55-65%  Mild CLVH.  Stage II diastolic dysfunction.  Mild LA dilatation.    21. ER evaluation, 03/22/2016. Facial swelling.  Lisinopril stopped, 03/2016.   22. Follow up with Dr. Tramaine Person, 2017. 23. Echo 07/31/2018: Essentially normal  24. Outpatient cardiac assessment 06/25/2020: Noncompliant with Coreg. Plan for reinstitution beta-blocker. Echo ordered    Review of Systems:  Constitutional: negative for fever and chills  Respiratory: negative for cough and hemoptysis  Cardiovascular:   Gastrointestinal: negative for abdominal pain, diarrhea, nausea and vomiting  Genitourinary:negative for dysuria and hematuria  Derm: negative for rash and skin lesion(s)  Neurological: negative for seizures and tremors  Endocrine: negative for diabetic symptoms including polydipsia and polyuria  Musculoskeletal: negative for CTD  Psychiatric: negative for psychosis and major depression    On examination, she is an alert pleasant elderly female in no distress skin is warm and dry. Respirations are unlabored. /60   Pulse 76   Temp 97.3 °F (36.3 °C)   Ht 5' 2\" (1.575 m)   Wt 250 lb 14.4 oz (113.8 kg)   BMI 45.89 kg/m² . HEENT negative for scleral icterus. Extraocular muscles intact. No facial asymmetry or central cyanosis. Neck without masses or goiter. No bruit or JVD. Cardiac apex not displaced. Rhythm regular. Abdomen normal.  Extremities without edema. Lungs are clear    EKG today shows sinus rhythm 76/min. LBBB. Low QRS voltage. The patient is symptomatically and functionally stable. She believes her exertional dyspnea is at baseline. Medications are reviewed today and no changes made.   Further recommendations will follow the scheduled echocardiogram.    At completion of today's visit, medications include the following:    Current Outpatient Medications:     traMADol (ULTRAM) 50 MG tablet, Take 1 tablet by mouth every 6 hours as needed for Pain for up to 30 days. , Disp: 120 tablet, Rfl: 0    PROAIR  (90 Base) MCG/ACT inhaler, Inhale 1 puff into the lungs 4 times daily, Disp: 3 Inhaler, Rfl: 1    Cholecalciferol (VITAMIN D3) 25 MCG (1000 UT) CAPS, Take by mouth daily, Disp: , Rfl:     carvedilol (COREG) 25 MG tablet, TAKE 1 TABLET BY MOUTH TWICE DAILY WITH MEALS, Disp: 180 tablet, Rfl: 3    alendronate (FOSAMAX) 70 MG tablet, Take 1 tablet by mouth once a week, Disp: 12 tablet, Rfl: 0    apixaban (ELIQUIS) 5 MG TABS tablet, Take 1 tablet by mouth 2 times daily To be started on 4/1/2019, Disp: 60 tablet, Rfl: 6    metFORMIN (GLUCOPHAGE) 500 MG tablet, Take 2 tablets by mouth 2 times daily (Patient taking differently: Take 1,000 mg by mouth 4 times daily ), Disp: 360 tablet, Rfl: 1    furosemide (LASIX) 20 MG tablet, Take 20 mg by mouth daily as needed, Disp: , Rfl:     budesonide-formoterol (SYMBICORT) 80-4.5 MCG/ACT AERO, Inhale 2 puffs into the lungs 2 times daily (Patient taking differently: Inhale 2 puffs into the lungs as needed ), Disp: 3 Inhaler, Rfl: 1    hydrALAZINE (APRESOLINE) 50 MG tablet, Take 1 tablet by mouth 2 times daily, Disp: 180 tablet, Rfl: 1    lovastatin (MEVACOR) 40 MG tablet, Take 1 tablet by mouth nightly, Disp: 90 tablet, Rfl: 1    Calcium Carbonate (CALTRATE 600 PO), Take by mouth 2 times daily , Disp: , Rfl:     potassium chloride (KLOR-CON) 10 MEQ extended release tablet, Take 1 tablet by mouth every other day (Patient not taking: Reported on 6/23/2020), Disp: 45 tablet, Rfl: 1      Note: This report was completed utilizing computer voice recognition software. Every effort has been made to ensure accuracy, however; inadvertent computerized transcription errors may be present.     --Jaylen Emerson MD on 7/23/2020 at 9:57 AM

## 2020-08-03 RX ORDER — LOVASTATIN 40 MG/1
40 TABLET ORAL NIGHTLY
Qty: 90 TABLET | Refills: 1 | Status: SHIPPED | OUTPATIENT
Start: 2020-08-03

## 2020-08-03 NOTE — TELEPHONE ENCOUNTER
Last Appointment:  3/11/2020  Future Appointments   Date Time Provider Ericka Weems   9/22/2020  9:30 AM [facility] FAST TRACK 1 [facility]   9/22/2020  9:45 AM Jan Pereyra MD MED ONC [facility]      Patient needs pended med refilled.     Electronically signed by Harley Galvin LPN on 0/6/2388 at 5:03 PM

## 2020-08-14 ENCOUNTER — TELEPHONE (OUTPATIENT)
Dept: CARDIOLOGY | Age: 70
End: 2020-08-14

## 2020-08-17 ENCOUNTER — HOSPITAL ENCOUNTER (OUTPATIENT)
Dept: CARDIOLOGY | Age: 70
Discharge: HOME | End: 2020-08-17
Payer: MEDICARE

## 2020-08-17 LAB
LV EF: 60 %
LVEF MODALITY: NORMAL

## 2020-08-17 PROCEDURE — 93306 TTE W/DOPPLER COMPLETE: CPT

## 2020-08-25 ENCOUNTER — TELEPHONE (OUTPATIENT)
Dept: CARDIOLOGY CLINIC | Age: 70
End: 2020-08-25

## 2020-08-25 NOTE — TELEPHONE ENCOUNTER
Contacted patient with echo results per Dr. Roxanne Glez. She acknowledged understanding. Letter forwarded to Dr. April Veliz.

## 2020-08-25 NOTE — TELEPHONE ENCOUNTER
----- Message from Hansel Romo MD sent at 8/23/2020  1:58 PM EDT -----    Chris Dickerson 79 tell the patient that her heart is strong and she has no severe abnormality. We are not recommending further testing or medication changes at this point.

## 2020-09-11 ENCOUNTER — TELEPHONE (OUTPATIENT)
Dept: CASE MANAGEMENT | Age: 70
End: 2020-09-11

## 2020-09-11 NOTE — TELEPHONE ENCOUNTER
Received a message that patient is requesting refill for her Eliquis. According to order patient should have refills remaining. [address] in [place]. Patient still has a number of refills and pharmacy feels she should not be ready for a refill yet due to last script being picked up in August. Called patient and updated her that she should still have pills left and she also has refills at the pharmacy when needed. She said she would check her pills to see and knows to contact the pharmacy for her refill. She verified next office visit with Dr Anders Ambrose on 9/22/20 at 0930.

## 2020-10-01 NOTE — PROGRESS NOTES
[address] MED ONCOLOGY  [address] [facility] [address]  Dept: [phone]  Attending Progress Note      Reason for Visit:   1. Recurrent VTE. 2. Factor V Leiden Homozygosity. Referring Physician:  Geetha Batista NP    PCP:  Amy Bird MD    History of Present Illness: The patient is a pleasant 80-year-old lady with a PMH significant for obesity, HTN, and hyperlipidemia, who was diagnosed with PE and bilateral DVTs that were provoked following ventral hernia repair in March 2017, she had her surgery done in [facility], she was anticoagulated with Coumadin, she was complaining of left knee pain, had a venous US done on 3/1/2019, revealing an acute DVT in the left posterior tibial vein, chronic appearing thromus from proximal femoral to peroneal veins. She was started on Lovenox 100 mg subq bid, INR was therapeutic, 2.2. She does not like the injections, prefers to be on an oral anticoagulant, AC was changed to Eliquis, no reported SE, but the co-pay is high. FH is negative for VTE. No bleeding. She has been wearing the compression stockings. Review of Systems;  CONSTITUTIONAL: No fever, chills. Good appetite, fair energy level. Pos for weight gain. ENMT: Eyes: No diplopia; Nose: No epistaxis. Mouth: No sore throat. RESPIRATORY: No hemoptysis, shortness of breath, cough. CARDIOVASCULAR: No chest pain, palpitations. GASTROINTESTINAL: No nausea/vomiting, abdominal pain, diarrhea/constipation. GENITOURINARY: No dysuria, urinary frequency, hematuria. NEURO: No syncope, presyncope, headache.   Remainder:  ROS NEGATIVE    Past Medical History:      Diagnosis Date    Hyperlipidemia     Hypertension     LBBB (left bundle branch block)     Obesity      Patient Active Problem List   Diagnosis    Mixed hyperlipidemia    Essential hypertension    LBBB (left bundle branch block)    Chronic deep vein thrombosis (DVT) of lower extremity ([facility]    Personal history of pulmonary embolism    Type 2 diabetes mellitus with complication, without long-term current use of insulin (HCC)    Chronic diastolic (congestive) heart failure (HCC)    Vitamin D deficiency    Venous insufficiency    Age-related osteoporosis without current pathological fracture    Other specified hypothyroidism    Primary generalized (osteo)arthritis    Chronic pain syndrome    Morbid obesity with BMI of 45.0-49.9, adult ([facility])    Seasonal allergic rhinitis due to pollen    Moderate persistent asthma with acute exacerbation    Class 3 severe obesity due to excess calories with serious comorbidity and body mass index (BMI) of 45.0 to 49.9 in adult [facility])    Encounter for immunization    Factor V deficiency ([facility]        Past Surgical History:      Procedure Laterality Date    APPENDECTOMY      CHOLECYSTECTOMY      COLOSTOMY      due to divertiulosis since than it was reversed    HERNIA REPAIR      HYSTERECTOMY, TOTAL ABDOMINAL         Family History:  Family History   Problem Relation Age of Onset    Cancer Mother     Diabetes Father     Kidney Disease Father        Medications:  Reviewed and reconciled.     Social History:  Social History     Socioeconomic History    Marital status:      Spouse name: Not on file    Number of children: Not on file    Years of education: Not on file    Highest education level: Not on file   Occupational History    Not on file   Social Needs    Financial resource strain: Not on file    Food insecurity     Worry: Not on file     Inability: Not on file   [employer] needs     Medical: Not on file     Non-medical: Not on file   Tobacco Use    Smoking status: Former Smoker     Packs/day: 1.00     Years: 40.00     Pack years: 40.00     Types: Cigarettes     Last attempt to quit: 2008     Years since quittin.7    Smokeless tobacco: Never Used   Substance and Sexual Activity    Alcohol anticoagulated with Coumadin, she was complaining of left knee pain, had a venous US done on 3/1/2019, revealing an acute DVT in the left posterior tibial vein, chronic appearing thromus from proximal femoral to peroneal veins. She was started on Lovenox 100 mg subq bid, INR was therapeutic, 2.2. She does not like the injections, prefers to be on an oral anticoagulant. Lovenox was d/sade and she was started on Eliquis. FH is negative for VTE. Patient with history of provoked PE and bilateral DVTs in 2017, she has recurrent VTE, ordered testing for inherited thrombophilia that is reliable in the setting of acute thrombosis. She is homozygous for factor V Leiden mutation, this was discussed with the patient and her son, ACLA are neg, anti B2GPI Antibodies are neg, PT gene mutation is negative. We discussed that obesity and decreased mobility increase the risk of VTE, I encouraged her to continue with weight loss. She had a f/up LLE venous US done revealing improvement of the DVT, she has a persistent occlusive thrombus in the proximal left superficial femoral vein and nonocclusive thrombus in the mid to distal superficial femoral vein, it is hard to tell for how long she had had those clots, continue  Eliquis, I do recommend lifelong anticoagulation unless if she has a contraindication to [facility] in the future, such as bleeding, refilled Eliquis, will investigate coverage, continue to wear compression stockings. I discussed with her the importance of weight loss. RTC in 1 year, sooner if new problems. Thank you for allowing us to participate in the care of Mrs. Banuelos.     Larry Pedroza MD   HEMATOLOGY/MEDICAL ONCOLOGY  [address] [place] [address] ONCOLOGY  [address] [facility] [address]  Dept: [phone]

## 2020-10-02 ENCOUNTER — OFFICE VISIT (OUTPATIENT)
Dept: ONCOLOGY | Age: 70
End: 2020-10-02
Payer: MEDICARE

## 2020-10-02 ENCOUNTER — HOSPITAL ENCOUNTER (OUTPATIENT)
Dept: INFUSION THERAPY | Age: 70
Discharge: HOME | End: 2020-10-02
Payer: MEDICARE

## 2020-10-02 VITALS
TEMPERATURE: 98.2 F | BODY MASS INDEX: 45.71 KG/M2 | OXYGEN SATURATION: 97 % | SYSTOLIC BLOOD PRESSURE: 158 MMHG | WEIGHT: 248.4 LBS | HEART RATE: 79 BPM | HEIGHT: 62 IN | DIASTOLIC BLOOD PRESSURE: 76 MMHG

## 2020-10-02 PROCEDURE — 99212 OFFICE O/P EST SF 10 MIN: CPT

## 2020-10-02 PROCEDURE — 99214 OFFICE O/P EST MOD 30 MIN: CPT | Performed by: INTERNAL MEDICINE

## 2020-10-02 NOTE — PROGRESS NOTES
Due to high copay patient can not afford Eliquis. Patient was referred to [facility] for Patient Assistance for copay and medication cost.      Samples of Eliquis 5 mg tablet were given to the patient, quantity 84, Lot Number [id number], EXP 04/2022. Patient had the opportunity to ask questions with all questions being answered to their satisfaction. The patient expresses understanding and acceptance of instructions.     Electronically signed by Ramu Mckeon, [address] [place] [address] on 10/2/2020 at 10:54 AM

## 2020-10-06 LAB
AVERAGE GLUCOSE: NORMAL
HBA1C MFR BLD: 8.9 %

## 2020-10-12 ENCOUNTER — TELEPHONE (OUTPATIENT)
Dept: CARDIOLOGY CLINIC | Age: 70
End: 2020-10-12

## 2020-10-14 ENCOUNTER — OFFICE VISIT (OUTPATIENT)
Dept: CARDIOLOGY CLINIC | Age: 70
End: 2020-10-14
Payer: MEDICARE

## 2020-10-14 VITALS
DIASTOLIC BLOOD PRESSURE: 72 MMHG | WEIGHT: 248.5 LBS | HEIGHT: 62 IN | BODY MASS INDEX: 45.73 KG/M2 | SYSTOLIC BLOOD PRESSURE: 138 MMHG | HEART RATE: 77 BPM | RESPIRATION RATE: 20 BRPM

## 2020-10-14 PROCEDURE — 93000 ELECTROCARDIOGRAM COMPLETE: CPT | Performed by: INTERNAL MEDICINE

## 2020-10-14 PROCEDURE — 99213 OFFICE O/P EST LOW 20 MIN: CPT | Performed by: INTERNAL MEDICINE

## 2020-10-14 RX ORDER — TRAMADOL HYDROCHLORIDE 50 MG/1
50 TABLET ORAL EVERY 6 HOURS PRN
Status: ON HOLD | COMMUNITY
End: 2021-03-11 | Stop reason: HOSPADM

## 2020-10-14 NOTE — PROGRESS NOTES
[facility]  [place] Sneha Suh M.D. Marietta Roman M.D.  [facility] Ryan. Jose Clarke M.D. Chen Cain M.D. Angelica Vizcarra M.D. MD Feliz Lealperla Wongon   1950  Romeo Kohli MD      This 71-year-old woman is seen today for outpatient cardiac follow-up. She has a history of hypertension, hyperlipidemia, DVT and chronic anticoagulation for factor V Leiden deficiency. She has a history of chronic exertional dyspnea. An echocardiogram 2020 was normal with the exception of mild left atrial dilatation and mild aortic valve stenosis. She reports recently having worsening dyspnea and fatigue. She was found to have significant anemia and had a blood transfusion at the [facility].  She denies melena or any bleeding.    :    Medical history:  1. Obesity. BMI  BMI 45.51 2020  2. IKE () and appendectomy.    3. Cholecystectomy, abdominal hernia repair and colon perforation (). Subsequent takedown of colostomy.    4. Chronic LBBB (). 5. Hypertension. 6. Seasonal allergies. 7. Hyperlipidemia. 8. Cigarette abuse, 40 pack years. Quit in . 9. Allergy to erythromycin. 10. No history MI, CHF, COPD or stroke. 11. Family history positive. Her father  of diabetes complications/renal failure. 12. [facility] stress MPS, 2009. Normal perfusion, diffuse hypokinesis, EF 38%. 13. Echo, 2009. Global hypokinesis, estimated EF 40-45%. Stage I diastolic dysfunction, LA 3.5. No pericardial effusion (Dr. Monica Candelario). 14. Sleep study, 2009.    15. Echo, 2011. LA 5.2. Mild LVEDD. Mild global hypokinesis. E/A 1.18 and E/E' 16.4. RVSP normal. EF 45%. Mild AS with mean gradient 9.  16. Limited echo, 2014. AURORA = 36 (moderate dilatation). E/A 1.29. E/E' 19. LA AP diameter 5.0.   17. Hernia repair, [facility], 2016.  Reports unavailable.  Possible DVT. 18. Echocardiogram, [facility], 2016. \"Moderate septal hypertrophy\".  Estimated ejection fraction, \"lower limits of normal estimated at 50%\".  Mild ERMIAS. 19. Ultrasound, 06/23/2016, [facility], \"positive for deep vein thrombosis\". 20. Limited echo, 07/20/2016.  EF 55-65%  Mild CLVH.  Stage II diastolic dysfunction.  Mild LA dilatation.    21. ER evaluation, 03/22/2016. Facial swelling.  Lisinopril stopped, 03/2016.   22. Follow up with Dr. Jae Luu, 2017. 23. Echo, 07/31/2018. Essentially normal.  24. Outpatient cardiac assessment, 06/25/2020. Noncompliant with Coreg. Plan for reinstitution beta-blocker. Echo ordered. 25. Echo, 08/17/2020. LV not dilated.  No WMA.  Estimated EF 55-65%.  Normal RV.  Left atrium mildly dilated.  Mild MR.  TR jet not recorded.  Mild aortic valve stenosis.  Peak velocity 2.22 m/s. Review of Systems:  Constitutional: negative for fever and chills  Respiratory: negative for cough and hemoptysis  Cardiovascular:   Gastrointestinal: negative for abdominal pain, diarrhea, nausea and vomiting  Genitourinary:negative for dysuria and hematuria  Derm: negative for rash and skin lesion(s)  Neurological: negative for seizures and tremors  Endocrine: negative for diabetic symptoms including polydipsia and polyuria  Musculoskeletal: negative for CTD  Psychiatric: negative for psychosis and major depression    On examination, she has an alert pleasant elderly  female in no distress   skin is warm and dry. Respirations are unlabored. /72   Pulse 77   Resp 20   Ht 5' 2\" (1.575 m)   Wt 248 lb 8 oz (112.7 kg)   BMI 45.45 kg/m² . HEENT negative for scleral icterus. Extraocular muscles intact. No facial asymmetry or central cyanosis. Neck without masses or goiter. No bruit or JVD. Cardiac apex not displaced. Rhythm regular. Abdomen normal.  Extremities without edema. Lungs are clear    EKG today per Dr. Alfie Hilton interpretation: Normal sinus mechanism.   LBBB    The patient's symptoms are likely attributable to her anemia. She reports that a abdominal CT scan has been requested as part of the evaluation. Medications are reviewed today and no changes made. It is anticipated that further evaluation will disclose the source of anemia.   She will have cardiac follow-up in about 6 months    At completion of today's visit, medications include the following:    Current Outpatient Medications:     traMADol (ULTRAM) 50 MG tablet, Take 50 mg by mouth every 6 hours as needed for Pain., Disp: , Rfl:     apixaban (ELIQUIS) 5 MG TABS tablet, Take 1 tablet by mouth 2 times daily, Disp: 84 tablet, Rfl: 0    PROAIR  (90 Base) MCG/ACT inhaler, Inhale 1 puff into the lungs 4 times daily, Disp: 3 Inhaler, Rfl: 1    Cholecalciferol (VITAMIN D3) 25 MCG (1000 UT) CAPS, Take by mouth daily, Disp: , Rfl:     carvedilol (COREG) 25 MG tablet, TAKE 1 TABLET BY MOUTH TWICE DAILY WITH MEALS, Disp: 180 tablet, Rfl: 3    metFORMIN (GLUCOPHAGE) 500 MG tablet, Take 2 tablets by mouth 2 times daily (Patient taking differently: Take 1,000 mg by mouth 4 times daily ), Disp: 360 tablet, Rfl: 1    furosemide (LASIX) 20 MG tablet, Take 20 mg by mouth daily as needed, Disp: , Rfl:     budesonide-formoterol (SYMBICORT) 80-4.5 MCG/ACT AERO, Inhale 2 puffs into the lungs 2 times daily (Patient taking differently: Inhale 2 puffs into the lungs as needed ), Disp: 3 Inhaler, Rfl: 1    hydrALAZINE (APRESOLINE) 50 MG tablet, Take 1 tablet by mouth 2 times daily, Disp: 180 tablet, Rfl: 1    Calcium Carbonate (CALTRATE 600 PO), Take by mouth 2 times daily , Disp: , Rfl:     lovastatin (MEVACOR) 40 MG tablet, Take 1 tablet by mouth nightly, Disp: 90 tablet, Rfl: 1    alendronate (FOSAMAX) 70 MG tablet, Take 1 tablet by mouth once a week (Patient not taking: Reported on 10/14/2020), Disp: 12 tablet, Rfl: 0    potassium chloride (KLOR-CON) 10 MEQ extended release tablet, Take 1 tablet by mouth every other day (Patient not taking: Reported on 10/14/2020), Disp: 45 tablet, Rfl: 1      Note: This report was completed utilizing computer voice recognition software. Every effort has been made to ensure accuracy, however; inadvertent computerized transcription errors may be present.     --Dorothea Puentes MD on 10/14/2020 at 2:53 PM     Cc Dr Felix Rodriguez

## 2020-10-16 ENCOUNTER — TELEPHONE (OUTPATIENT)
Dept: PHARMACY | Age: 70
End: 2020-10-16

## 2020-10-16 NOTE — TELEPHONE ENCOUNTER
Placed a call to [address] [place] [address] and was told that if Aneudy Samaniego could have her pharmacy give her a print out of her out of pocket expenses from January 1,2020 thru the current date and it totaled $435.72 she would be eligible to receive her Eliquis free of charge until the end of 2020. Arturo Parish is to call her pharmacy to get the report and then have them fax it to me to turn in. I left my contact information with her in case they would not fax paperwork to me so that she could call me back.

## 2020-10-21 ENCOUNTER — TELEPHONE (OUTPATIENT)
Dept: PHARMACY | Age: 70
End: 2020-10-21

## 2020-10-21 NOTE — TELEPHONE ENCOUNTER
Received a fax from [address] [place] [address] [facility] letting [facility] know that Roselyn Harmon has been approved to receive her Eliquis free of charge from 10- through 12-.   Information will be forwarded to Ann-Marie Comer and Michael Villa

## 2020-11-02 ENCOUNTER — TELEPHONE (OUTPATIENT)
Dept: INFUSION THERAPY | Age: 70
End: 2020-11-02

## 2020-11-02 NOTE — TELEPHONE ENCOUNTER
Patient called in and left message. Patient wanted Dr Krissy Leger notified that she will be having surgery for colon cancer on November 20 th,2020. Will update Dr Krissy Leger.

## 2020-11-05 PROBLEM — D50.0 IRON DEFICIENCY ANEMIA DUE TO CHRONIC BLOOD LOSS: Status: ACTIVE | Noted: 2020-11-05

## 2020-11-06 ENCOUNTER — HOSPITAL ENCOUNTER (OUTPATIENT)
Dept: INFUSION THERAPY | Age: 70
Discharge: HOME | End: 2020-11-06
Payer: MEDICARE

## 2020-11-06 ENCOUNTER — OFFICE VISIT (OUTPATIENT)
Dept: ONCOLOGY | Age: 70
End: 2020-11-06
Payer: MEDICARE

## 2020-11-06 ENCOUNTER — TELEPHONE (OUTPATIENT)
Dept: ONCOLOGY | Age: 70
End: 2020-11-06

## 2020-11-06 VITALS
OXYGEN SATURATION: 98 % | HEIGHT: 62 IN | SYSTOLIC BLOOD PRESSURE: 158 MMHG | WEIGHT: 241.6 LBS | DIASTOLIC BLOOD PRESSURE: 71 MMHG | HEART RATE: 70 BPM | TEMPERATURE: 97.2 F | BODY MASS INDEX: 44.46 KG/M2

## 2020-11-06 DIAGNOSIS — D50.0 IRON DEFICIENCY ANEMIA DUE TO CHRONIC BLOOD LOSS: ICD-10-CM

## 2020-11-06 LAB
ANISOCYTOSIS: ABNORMAL
BASOPHILS ABSOLUTE: 0.05 E9/L (ref 0–0.2)
BASOPHILS RELATIVE PERCENT: 0.8 % (ref 0–2)
BUN BLDV-MCNC: 11 MG/DL (ref 8–23)
CREAT SERPL-MCNC: 0.8 MG/DL (ref 0.5–1)
EOSINOPHILS ABSOLUTE: 0.15 E9/L (ref 0.05–0.5)
EOSINOPHILS RELATIVE PERCENT: 2.5 % (ref 0–6)
FERRITIN: 31 NG/ML
GFR AFRICAN AMERICAN: >60
GFR NON-AFRICAN AMERICAN: >60 ML/MIN/1.73
HCT VFR BLD CALC: 36.2 % (ref 34–48)
HEMOGLOBIN: 10.3 G/DL (ref 11.5–15.5)
HYPOCHROMIA: ABNORMAL
IMMATURE GRANULOCYTES #: 0.02 E9/L
IMMATURE GRANULOCYTES %: 0.3 % (ref 0–5)
IRON SATURATION: 8 % (ref 15–50)
IRON: 37 MCG/DL (ref 37–145)
LYMPHOCYTES ABSOLUTE: 1.47 E9/L (ref 1.5–4)
LYMPHOCYTES RELATIVE PERCENT: 24.7 % (ref 20–42)
MCH RBC QN AUTO: 22.3 PG (ref 26–35)
MCHC RBC AUTO-ENTMCNC: 28.5 % (ref 32–34.5)
MCV RBC AUTO: 78.4 FL (ref 80–99.9)
MONOCYTES ABSOLUTE: 0.43 E9/L (ref 0.1–0.95)
MONOCYTES RELATIVE PERCENT: 7.2 % (ref 2–12)
NEUTROPHILS ABSOLUTE: 3.82 E9/L (ref 1.8–7.3)
NEUTROPHILS RELATIVE PERCENT: 64.5 % (ref 43–80)
OVALOCYTES: ABNORMAL
PDW BLD-RTO: 23 FL (ref 11.5–15)
PLATELET # BLD: 202 E9/L (ref 130–450)
PMV BLD AUTO: 9.7 FL (ref 7–12)
POIKILOCYTES: ABNORMAL
POLYCHROMASIA: ABNORMAL
RBC # BLD: 4.62 E12/L (ref 3.5–5.5)
TOTAL IRON BINDING CAPACITY: 467 MCG/DL (ref 250–450)
WBC # BLD: 5.9 E9/L (ref 4.5–11.5)

## 2020-11-06 PROCEDURE — 99214 OFFICE O/P EST MOD 30 MIN: CPT | Performed by: INTERNAL MEDICINE

## 2020-11-06 PROCEDURE — 83550 IRON BINDING TEST: CPT

## 2020-11-06 PROCEDURE — 83540 ASSAY OF IRON: CPT

## 2020-11-06 PROCEDURE — 82728 ASSAY OF FERRITIN: CPT

## 2020-11-06 PROCEDURE — 85025 COMPLETE CBC W/AUTO DIFF WBC: CPT

## 2020-11-06 PROCEDURE — 82565 ASSAY OF CREATININE: CPT

## 2020-11-06 PROCEDURE — 36415 COLL VENOUS BLD VENIPUNCTURE: CPT

## 2020-11-06 PROCEDURE — 84520 ASSAY OF UREA NITROGEN: CPT

## 2020-11-06 RX ORDER — FERROUS SULFATE 325(65) MG
TABLET ORAL
COMMUNITY
Start: 2020-10-23

## 2020-11-06 NOTE — PROGRESS NOTES
[address] [facility]  [address] [facility] [id number]  Dept: [phone]  Attending Progress Note      Reason for Visit:   1. Recurrent VTE. 2. Factor V Leiden Homozygosity. Referring Physician:  Georgia Barakat NP    PCP:  Daisy Keating MD    History of Present Illness: The patient is a pleasant 70-year-old lady with a PMH significant for obesity, HTN, and hyperlipidemia, who was diagnosed with PE and bilateral DVTs that were provoked following ventral hernia repair in March 2017, she had her surgery done in [facility], she was anticoagulated with Coumadin, she was complaining of left knee pain, had a venous US done on 3/1/2019, revealing an acute DVT in the left posterior tibial vein, chronic appearing thromus from proximal femoral to peroneal veins. She was started on Lovenox 100 mg subq bid, INR was therapeutic, 2.2. She does not like the injections, prefers to be on an oral anticoagulant, AC was changed to Eliquis. FH is negative for VTE. No bleeding. She has been wearing the compression stockings. The patient had presents with thrills over the and fatigue, she was found to be anemic, she had blood transfusion at [facility], she had a colonoscopy done revealing colon cancer, CEA is 0.6, a CT scan was done on 10/23/2020 for the abdomen and the pelvis had revealed a 2.5 cm mass in the ascending colon, hepatomegaly with fatty infiltration of the liver. She is scheduled to have a surgery with Dr. Leonie Arevalo at [facility] 11/20/2020. She is on oral iron, no reported side effects. Review of Systems;  CONSTITUTIONAL: No fever, chills. Good appetite, feeling tired. Pos for weight gain. ENMT: Eyes: No diplopia; Nose: No epistaxis. Mouth: No sore throat. RESPIRATORY: No hemoptysis, shortness of breath, cough. CARDIOVASCULAR: No chest pain, palpitations.   GASTROINTESTINAL: No nausea/vomiting, abdominal pain, Occupational History    Not on file   Social Needs    Financial resource strain: Not on file    Food insecurity     Worry: Not on file     Inability: Not on file    Transportation needs     Medical: Not on file     Non-medical: Not on file   Tobacco Use    Smoking status: Former Smoker     Packs/day: 1.00     Years: 40.00     Pack years: 40.00     Types: Cigarettes     Last attempt to quit: 2008     Years since quittin.8    Smokeless tobacco: Never Used   Substance and Sexual Activity    Alcohol use: Yes     Comment: occassionally    Drug use: No    Sexual activity: Not Currently   Lifestyle    Physical activity     Days per week: Not on file     Minutes per session: Not on file    Stress: Not on file   Relationships    Social connections     Talks on phone: Not on file     Gets together: Not on file     Attends Hinduism service: Not on file     Active member of club or organization: Not on file     Attends meetings of clubs or organizations: Not on file     Relationship status: Not on file    Intimate partner violence     Fear of current or ex partner: Not on file     Emotionally abused: Not on file     Physically abused: Not on file     Forced sexual activity: Not on file   Other Topics Concern    Not on file   Social History Narrative    Not on file       Allergies: Allergies   Allergen Reactions    Amlodipine      Hives      Cefdinir     E-Mycin [Erythromycin]      Made her dizzy and she didn't feel well    Lisinopril     Seasonal        Physical Exam:  BP (!) 158/71 (Site: Left Upper Arm, Position: Sitting, Cuff Size: Medium Adult)   Pulse 70   Temp 97.2 °F (36.2 °C) (Temporal)   Ht 5' 2\" (1.575 m)   Wt 241 lb 9.6 oz (109.6 kg)   SpO2 98%   BMI 44.19 kg/m²     GENERAL: Alert, oriented x 3, not in acute distress. HEENT: PERRLA; EOMI. Oropharynx clear. NECK: Supple. No palpable cervical or supraclavicular lymphadenopathy. LUNGS: Good air entry bilaterally.  No wheezing,

## 2020-11-06 NOTE — TELEPHONE ENCOUNTER
Phoned patient to advise Dr Clary Rendon would like her to continue oral Iron at this time her lab results look good. Patient verbally understood.

## 2020-11-11 ENCOUNTER — TELEPHONE (OUTPATIENT)
Dept: INFUSION THERAPY | Age: 70
End: 2020-11-11

## 2020-11-11 NOTE — TELEPHONE ENCOUNTER
Faxed CT Scan order, with insurance information, no authorization needed, all correspondence from insurance faxed to [facility].  Including reference number, [facility] is in network. Confirmation received, called patient, left message that all information has been forwarded and CT Scan can be scheduled prior to surgery. Call back number provided.

## 2021-03-02 ENCOUNTER — APPOINTMENT (OUTPATIENT)
Dept: GENERAL RADIOLOGY | Age: 71
DRG: 871 | End: 2021-03-02
Payer: MEDICARE

## 2021-03-02 ENCOUNTER — HOSPITAL ENCOUNTER (INPATIENT)
Age: 71
LOS: 9 days | Discharge: NURSING, REHAB OR LONG-TERM CARE | DRG: 871 | End: 2021-03-11
Attending: EMERGENCY MEDICINE | Admitting: INTERNAL MEDICINE
Payer: MEDICARE

## 2021-03-02 ENCOUNTER — APPOINTMENT (OUTPATIENT)
Dept: CT IMAGING | Age: 71
DRG: 871 | End: 2021-03-02
Payer: MEDICARE

## 2021-03-02 DIAGNOSIS — E87.5 ACUTE HYPERKALEMIA: ICD-10-CM

## 2021-03-02 DIAGNOSIS — E87.1 ACUTE HYPONATREMIA: ICD-10-CM

## 2021-03-02 DIAGNOSIS — E11.8 TYPE 2 DIABETES MELLITUS WITH COMPLICATION, WITHOUT LONG-TERM CURRENT USE OF INSULIN (HCC): ICD-10-CM

## 2021-03-02 DIAGNOSIS — I95.9 HYPOTENSION, UNSPECIFIED HYPOTENSION TYPE: ICD-10-CM

## 2021-03-02 DIAGNOSIS — N17.9 ACUTE KIDNEY INJURY (HCC): Primary | ICD-10-CM

## 2021-03-02 PROBLEM — N18.30 CKD (CHRONIC KIDNEY DISEASE) STAGE 3, GFR 30-59 ML/MIN (HCC): Status: ACTIVE | Noted: 2021-03-02

## 2021-03-02 LAB
ALBUMIN SERPL-MCNC: 3 G/DL (ref 3.5–5.2)
ALP BLD-CCNC: 101 U/L (ref 35–104)
ALT SERPL-CCNC: 17 U/L (ref 0–32)
ANION GAP SERPL CALCULATED.3IONS-SCNC: 14 MMOL/L (ref 7–16)
ANION GAP SERPL CALCULATED.3IONS-SCNC: 16 MMOL/L (ref 7–16)
APTT: 38.2 SEC (ref 24.5–35.1)
AST SERPL-CCNC: 51 U/L (ref 0–31)
BACTERIA: ABNORMAL /HPF
BASOPHILS ABSOLUTE: 0.11 E9/L (ref 0–0.2)
BASOPHILS RELATIVE PERCENT: 0.9 % (ref 0–2)
BILIRUB SERPL-MCNC: 1.1 MG/DL (ref 0–1.2)
BILIRUBIN URINE: NEGATIVE
BLOOD, URINE: NEGATIVE
BUN BLDV-MCNC: 46 MG/DL (ref 8–23)
BUN BLDV-MCNC: 50 MG/DL (ref 8–23)
CALCIUM SERPL-MCNC: 8.6 MG/DL (ref 8.6–10.2)
CALCIUM SERPL-MCNC: 9.5 MG/DL (ref 8.6–10.2)
CHLORIDE BLD-SCNC: 82 MMOL/L (ref 98–107)
CHLORIDE BLD-SCNC: 88 MMOL/L (ref 98–107)
CHLORIDE URINE RANDOM: <20 MMOL/L
CLARITY: CLEAR
CO2: 21 MMOL/L (ref 22–29)
CO2: 27 MMOL/L (ref 22–29)
COLOR: YELLOW
CORTISOL TOTAL: 12.91 MCG/DL (ref 2.68–18.4)
CREAT SERPL-MCNC: 2.4 MG/DL (ref 0.5–1)
CREAT SERPL-MCNC: 2.8 MG/DL (ref 0.5–1)
CREATININE URINE: 88 MG/DL (ref 29–226)
EOSINOPHILS ABSOLUTE: 0.11 E9/L (ref 0.05–0.5)
EOSINOPHILS RELATIVE PERCENT: 0.9 % (ref 0–6)
GFR AFRICAN AMERICAN: 20
GFR AFRICAN AMERICAN: 24
GFR NON-AFRICAN AMERICAN: 17 ML/MIN/1.73
GFR NON-AFRICAN AMERICAN: 20 ML/MIN/1.73
GLUCOSE BLD-MCNC: 118 MG/DL (ref 74–99)
GLUCOSE BLD-MCNC: 188 MG/DL (ref 74–99)
GLUCOSE URINE: NEGATIVE MG/DL
HCT VFR BLD CALC: 38.3 % (ref 34–48)
HEMOGLOBIN: 12.4 G/DL (ref 11.5–15.5)
IMMATURE GRANULOCYTES #: 0.05 E9/L
IMMATURE GRANULOCYTES %: 0.4 % (ref 0–5)
INR BLD: 2
KETONES, URINE: NEGATIVE MG/DL
LACTIC ACID, SEPSIS: 1.6 MMOL/L (ref 0.5–1.9)
LEUKOCYTE ESTERASE, URINE: ABNORMAL
LYMPHOCYTES ABSOLUTE: 2.72 E9/L (ref 1.5–4)
LYMPHOCYTES RELATIVE PERCENT: 23.3 % (ref 20–42)
MCH RBC QN AUTO: 28.2 PG (ref 26–35)
MCHC RBC AUTO-ENTMCNC: 32.4 % (ref 32–34.5)
MCV RBC AUTO: 87 FL (ref 80–99.9)
MONOCYTES ABSOLUTE: 0.83 E9/L (ref 0.1–0.95)
MONOCYTES RELATIVE PERCENT: 7.1 % (ref 2–12)
NEUTROPHILS ABSOLUTE: 7.83 E9/L (ref 1.8–7.3)
NEUTROPHILS RELATIVE PERCENT: 67.4 % (ref 43–80)
NITRITE, URINE: POSITIVE
OSMOLALITY URINE: 277 MOSM/KG (ref 300–900)
PDW BLD-RTO: 14.9 FL (ref 11.5–15)
PH UA: 7 (ref 5–9)
PLATELET # BLD: 310 E9/L (ref 130–450)
PMV BLD AUTO: 8.7 FL (ref 7–12)
POTASSIUM REFLEX MAGNESIUM: 6.1 MMOL/L (ref 3.5–5)
POTASSIUM SERPL-SCNC: 5.1 MMOL/L (ref 3.5–5)
POTASSIUM, UR: 60.3 MMOL/L
PROTEIN UA: NEGATIVE MG/DL
PROTHROMBIN TIME: 22.3 SEC (ref 9.3–12.4)
RBC # BLD: 4.4 E12/L (ref 3.5–5.5)
RBC UA: ABNORMAL /HPF (ref 0–2)
SARS-COV-2, NAAT: NOT DETECTED
SODIUM BLD-SCNC: 123 MMOL/L (ref 132–146)
SODIUM BLD-SCNC: 125 MMOL/L (ref 132–146)
SODIUM URINE: <20 MMOL/L
SPECIFIC GRAVITY UA: 1.01 (ref 1–1.03)
TOTAL PROTEIN: 8.9 G/DL (ref 6.4–8.3)
TROPONIN: 0.05 NG/ML (ref 0–0.03)
UROBILINOGEN, URINE: 0.2 E.U./DL
WBC # BLD: 11.7 E9/L (ref 4.5–11.5)
WBC UA: ABNORMAL /HPF (ref 0–5)

## 2021-03-02 PROCEDURE — 87186 SC STD MICRODIL/AGAR DIL: CPT

## 2021-03-02 PROCEDURE — 6360000004 HC RX CONTRAST MEDICATION: Performed by: RADIOLOGY

## 2021-03-02 PROCEDURE — 85025 COMPLETE CBC W/AUTO DIFF WBC: CPT

## 2021-03-02 PROCEDURE — 87088 URINE BACTERIA CULTURE: CPT

## 2021-03-02 PROCEDURE — 96375 TX/PRO/DX INJ NEW DRUG ADDON: CPT

## 2021-03-02 PROCEDURE — 82436 ASSAY OF URINE CHLORIDE: CPT

## 2021-03-02 PROCEDURE — 87077 CULTURE AEROBIC IDENTIFY: CPT

## 2021-03-02 PROCEDURE — 2580000003 HC RX 258: Performed by: EMERGENCY MEDICINE

## 2021-03-02 PROCEDURE — 80048 BASIC METABOLIC PNL TOTAL CA: CPT

## 2021-03-02 PROCEDURE — 6370000000 HC RX 637 (ALT 250 FOR IP): Performed by: INTERNAL MEDICINE

## 2021-03-02 PROCEDURE — 83935 ASSAY OF URINE OSMOLALITY: CPT

## 2021-03-02 PROCEDURE — 93005 ELECTROCARDIOGRAM TRACING: CPT | Performed by: STUDENT IN AN ORGANIZED HEALTH CARE EDUCATION/TRAINING PROGRAM

## 2021-03-02 PROCEDURE — 6360000002 HC RX W HCPCS: Performed by: EMERGENCY MEDICINE

## 2021-03-02 PROCEDURE — 96365 THER/PROPH/DIAG IV INF INIT: CPT

## 2021-03-02 PROCEDURE — 2580000003 HC RX 258: Performed by: INTERNAL MEDICINE

## 2021-03-02 PROCEDURE — 80053 COMPREHEN METABOLIC PANEL: CPT

## 2021-03-02 PROCEDURE — 87635 SARS-COV-2 COVID-19 AMP PRB: CPT

## 2021-03-02 PROCEDURE — 85610 PROTHROMBIN TIME: CPT

## 2021-03-02 PROCEDURE — 6360000002 HC RX W HCPCS: Performed by: STUDENT IN AN ORGANIZED HEALTH CARE EDUCATION/TRAINING PROGRAM

## 2021-03-02 PROCEDURE — 2500000003 HC RX 250 WO HCPCS: Performed by: STUDENT IN AN ORGANIZED HEALTH CARE EDUCATION/TRAINING PROGRAM

## 2021-03-02 PROCEDURE — 2060000000 HC ICU INTERMEDIATE R&B

## 2021-03-02 PROCEDURE — 84133 ASSAY OF URINE POTASSIUM: CPT

## 2021-03-02 PROCEDURE — 82533 TOTAL CORTISOL: CPT

## 2021-03-02 PROCEDURE — 99284 EMERGENCY DEPT VISIT MOD MDM: CPT

## 2021-03-02 PROCEDURE — 87040 BLOOD CULTURE FOR BACTERIA: CPT

## 2021-03-02 PROCEDURE — 84484 ASSAY OF TROPONIN QUANT: CPT

## 2021-03-02 PROCEDURE — 74176 CT ABD & PELVIS W/O CONTRAST: CPT

## 2021-03-02 PROCEDURE — 2580000003 HC RX 258: Performed by: STUDENT IN AN ORGANIZED HEALTH CARE EDUCATION/TRAINING PROGRAM

## 2021-03-02 PROCEDURE — 71045 X-RAY EXAM CHEST 1 VIEW: CPT

## 2021-03-02 PROCEDURE — 36415 COLL VENOUS BLD VENIPUNCTURE: CPT

## 2021-03-02 PROCEDURE — 85730 THROMBOPLASTIN TIME PARTIAL: CPT

## 2021-03-02 PROCEDURE — 82570 ASSAY OF URINE CREATININE: CPT

## 2021-03-02 PROCEDURE — 6370000000 HC RX 637 (ALT 250 FOR IP): Performed by: STUDENT IN AN ORGANIZED HEALTH CARE EDUCATION/TRAINING PROGRAM

## 2021-03-02 PROCEDURE — 81001 URINALYSIS AUTO W/SCOPE: CPT

## 2021-03-02 PROCEDURE — 84300 ASSAY OF URINE SODIUM: CPT

## 2021-03-02 PROCEDURE — 70450 CT HEAD/BRAIN W/O DYE: CPT

## 2021-03-02 PROCEDURE — 96366 THER/PROPH/DIAG IV INF ADDON: CPT

## 2021-03-02 PROCEDURE — 83605 ASSAY OF LACTIC ACID: CPT

## 2021-03-02 RX ORDER — ALBUTEROL SULFATE 2.5 MG/3ML
2.5 SOLUTION RESPIRATORY (INHALATION) EVERY 4 HOURS PRN
Status: DISCONTINUED | OUTPATIENT
Start: 2021-03-02 | End: 2021-03-11 | Stop reason: HOSPADM

## 2021-03-02 RX ORDER — SODIUM POLYSTYRENE SULFONATE 15 G/60ML
15 SUSPENSION ORAL; RECTAL ONCE
Status: COMPLETED | OUTPATIENT
Start: 2021-03-02 | End: 2021-03-02

## 2021-03-02 RX ORDER — BUDESONIDE AND FORMOTEROL FUMARATE DIHYDRATE 80; 4.5 UG/1; UG/1
2 AEROSOL RESPIRATORY (INHALATION) 2 TIMES DAILY
Status: DISCONTINUED | OUTPATIENT
Start: 2021-03-02 | End: 2021-03-02 | Stop reason: CLARIF

## 2021-03-02 RX ORDER — SODIUM CHLORIDE 9 MG/ML
INJECTION, SOLUTION INTRAVENOUS CONTINUOUS
Status: DISCONTINUED | OUTPATIENT
Start: 2021-03-02 | End: 2021-03-05

## 2021-03-02 RX ORDER — BUDESONIDE 0.25 MG/2ML
0.25 INHALANT ORAL 2 TIMES DAILY
Status: DISCONTINUED | OUTPATIENT
Start: 2021-03-02 | End: 2021-03-11 | Stop reason: HOSPADM

## 2021-03-02 RX ORDER — SODIUM CHLORIDE 0.9 % (FLUSH) 0.9 %
10 SYRINGE (ML) INJECTION ONCE
Status: DISCONTINUED | OUTPATIENT
Start: 2021-03-02 | End: 2021-03-11 | Stop reason: HOSPADM

## 2021-03-02 RX ORDER — ARFORMOTEROL TARTRATE 15 UG/2ML
15 SOLUTION RESPIRATORY (INHALATION) 2 TIMES DAILY
Status: DISCONTINUED | OUTPATIENT
Start: 2021-03-02 | End: 2021-03-11 | Stop reason: HOSPADM

## 2021-03-02 RX ORDER — CALCIUM CARBONATE 500(1250)
500 TABLET ORAL 2 TIMES DAILY
Status: DISCONTINUED | OUTPATIENT
Start: 2021-03-02 | End: 2021-03-08

## 2021-03-02 RX ORDER — 0.9 % SODIUM CHLORIDE 0.9 %
1000 INTRAVENOUS SOLUTION INTRAVENOUS ONCE
Status: DISCONTINUED | OUTPATIENT
Start: 2021-03-02 | End: 2021-03-08

## 2021-03-02 RX ORDER — 0.9 % SODIUM CHLORIDE 0.9 %
1000 INTRAVENOUS SOLUTION INTRAVENOUS ONCE
Status: COMPLETED | OUTPATIENT
Start: 2021-03-02 | End: 2021-03-02

## 2021-03-02 RX ORDER — DEXTROSE MONOHYDRATE 25 G/50ML
25 INJECTION, SOLUTION INTRAVENOUS ONCE
Status: COMPLETED | OUTPATIENT
Start: 2021-03-02 | End: 2021-03-02

## 2021-03-02 RX ORDER — TRAMADOL HYDROCHLORIDE 50 MG/1
50 TABLET ORAL EVERY 6 HOURS PRN
Status: DISCONTINUED | OUTPATIENT
Start: 2021-03-02 | End: 2021-03-11 | Stop reason: HOSPADM

## 2021-03-02 RX ORDER — FERROUS SULFATE 325(65) MG
325 TABLET ORAL
Status: DISCONTINUED | OUTPATIENT
Start: 2021-03-03 | End: 2021-03-11 | Stop reason: HOSPADM

## 2021-03-02 RX ADMIN — IOHEXOL 50 ML: 240 INJECTION, SOLUTION INTRATHECAL; INTRAVASCULAR; INTRAVENOUS; ORAL at 15:50

## 2021-03-02 RX ADMIN — HYDROCORTISONE SODIUM SUCCINATE 100 MG: 100 INJECTION, POWDER, FOR SOLUTION INTRAMUSCULAR; INTRAVENOUS at 17:09

## 2021-03-02 RX ADMIN — SODIUM CHLORIDE 1000 ML: 9 INJECTION, SOLUTION INTRAVENOUS at 15:18

## 2021-03-02 RX ADMIN — SODIUM POLYSTYRENE SULFONATE 15 G: 15 SUSPENSION ORAL; RECTAL at 17:12

## 2021-03-02 RX ADMIN — PIPERACILLIN SODIUM AND TAZOBACTAM SODIUM 4500 MG: 4; .5 INJECTION, POWDER, LYOPHILIZED, FOR SOLUTION INTRAVENOUS at 17:07

## 2021-03-02 RX ADMIN — SODIUM CHLORIDE: 9 INJECTION, SOLUTION INTRAVENOUS at 17:11

## 2021-03-02 RX ADMIN — SODIUM CHLORIDE: 9 INJECTION, SOLUTION INTRAVENOUS at 23:52

## 2021-03-02 RX ADMIN — Medication 1000 MG: at 14:14

## 2021-03-02 RX ADMIN — CALCIUM 500 MG: 500 TABLET ORAL at 23:45

## 2021-03-02 RX ADMIN — DEXTROSE MONOHYDRATE 25 G: 25 INJECTION, SOLUTION INTRAVENOUS at 14:14

## 2021-03-02 RX ADMIN — SODIUM CHLORIDE 1000 ML: 9 INJECTION, SOLUTION INTRAVENOUS at 13:06

## 2021-03-02 RX ADMIN — APIXABAN 5 MG: 5 TABLET, FILM COATED ORAL at 23:45

## 2021-03-02 RX ADMIN — INSULIN HUMAN 10 UNITS: 100 INJECTION, SOLUTION PARENTERAL at 14:14

## 2021-03-02 ASSESSMENT — ENCOUNTER SYMPTOMS
VOMITING: 0
NAUSEA: 0
ABDOMINAL DISTENTION: 0
BACK PAIN: 0
SHORTNESS OF BREATH: 0
COUGH: 0
SORE THROAT: 0
DIARRHEA: 0
WHEEZING: 0
SINUS PRESSURE: 0

## 2021-03-02 NOTE — ED PROVIDER NOTES
[facility]  Department of Emergency Medicine     Written by: Qamar Belle DO  Patient Name: Isis Madera  Attending Provider: Gabriel Escalante MD  Admit Date: 3/2/2021 12:30 PM  MRN: [id number]                   : 1950        Chief Complaint   Patient presents with   [employer] Altered Mental Status     LETHARGIC/HYPOTENSIVE AT [address]    - Chief complaint    Patient is a 77-year-old female past no history of hyperlipidemia, hypertension, type 2 diabetes and recent small bowel surgery. Patient presents from skilled nursing facility due to altered mental status and hypotension. Per reports patient's blood pressure was 60/20 this morning. EMS states the patient was responding to IV fluids and on arrival blood pressure was 90/40. Symptoms have been constant since onset, moderate in severity on exam patient is alert and oriented to person place but not time or condition, she denies any specific complaints at this time other than fatigue. Patient notes that she initially had small bowel surgery at [place] but due to complications had be transferred to [facility] clinic for ileostomy placement and tracheostomy. Patient had a prolonged hospital stay and was initially discharged to [facility] and then ultimately skilled nursing [facility]. Patient denies any fevers, chills, nausea, vomiting, chest pain, cough or shortness of breath. The history is provided by the patient. No  was used. Review of Systems   Constitutional: Positive for appetite change and fatigue. Negative for chills and fever. HENT: Negative for ear pain, sinus pressure and sore throat. Respiratory: Negative for cough, shortness of breath and wheezing. Cardiovascular: Negative for chest pain. Gastrointestinal: Negative for abdominal distention, diarrhea, nausea and vomiting. Genitourinary: Negative for dysuria and frequency. Musculoskeletal: Negative for arthralgias and back pain.    Skin: Positive for wound. Negative for rash. Neurological: Negative for weakness and headaches. Hematological: Negative for adenopathy. All other systems reviewed and are negative. Physical Exam  Vitals signs and nursing note reviewed. Constitutional:       General: She is not in acute distress. Appearance: Normal appearance. HENT:      Head: Normocephalic and atraumatic. Nose: No congestion or rhinorrhea. Mouth/Throat:      Mouth: Mucous membranes are moist.      Pharynx: Oropharynx is clear. Eyes:      Extraocular Movements: Extraocular movements intact. Pupils: Pupils are equal, round, and reactive to light. Neck:      Musculoskeletal: Normal range of motion. No neck rigidity or muscular tenderness. Cardiovascular:      Rate and Rhythm: Normal rate and regular rhythm. Heart sounds: No murmur. No gallop. Pulmonary:      Effort: Pulmonary effort is normal. No respiratory distress. Breath sounds: No wheezing, rhonchi or rales. Abdominal:      General: Abdomen is flat. Palpations: Abdomen is soft. There is no mass. Tenderness: There is no abdominal tenderness. There is no guarding. Hernia: No hernia is present. Comments: 1 ostomy in right upper quadrant draining bowel, in addition ileostomy left lower quadrant draining liquid stool. Large umbilical incision that is partially closed there is packing in place. No purulent drainage noted. Musculoskeletal: Normal range of motion. General: No swelling, tenderness or signs of injury. Skin:     General: Skin is warm and dry. Capillary Refill: Capillary refill takes less than 2 seconds. Neurological:      General: No focal deficit present. Mental Status: Mental status is at baseline. She is lethargic. Psychiatric:         Mood and Affect: Mood normal.          Procedures   EKG #1:  Interpreted by emergency department physician unless otherwise noted.   Time:  1304   Rate: problems: moderate  Diagnostic procedures: moderate  Management options: moderate    Patient Progress  Patient progress: stable       ED Course as of Mar 02 1708   Tue Mar 02, 2021   1647 Spoke with Dr. Sha Guzmán has agreed to admit patient. [BP]      ED Course User Index  [BP] Ian Avila, DO      --------------------------------------------- PAST HISTORY ---------------------------------------------  Past Medical History:  has a past medical history of Hyperlipidemia, Hypertension, LBBB (left bundle branch block), and Obesity. Past Surgical History:  has a past surgical history that includes Appendectomy; colostomy (2007); Cholecystectomy; hernia repair; and Hysterectomy, total abdominal (1990). Social History:  reports that she quit smoking about 13 years ago. Her smoking use included cigarettes. She has a 40.00 pack-year smoking history. She has never used smokeless tobacco. She reports current alcohol use. She reports that she does not use drugs. Family History: family history includes Cancer in her mother; Diabetes in her father; Kidney Disease in her father. The patients home medications have been reviewed.     Allergies: Amlodipine, Cefdinir, E-mycin [erythromycin], Lisinopril, and Seasonal    -------------------------------------------------- RESULTS -------------------------------------------------    LABS:  Results for orders placed or performed during the hospital encounter of 03/02/21   COVID-19, Rapid    Specimen: Nasopharyngeal Swab   Result Value Ref Range    SARS-CoV-2, NAAT Not Detected Not Detected   CBC Auto Differential   Result Value Ref Range    WBC 11.7 (H) 4.5 - 11.5 E9/L    RBC 4.40 3.50 - 5.50 E12/L    Hemoglobin 12.4 11.5 - 15.5 g/dL    Hematocrit 38.3 34.0 - 48.0 %    MCV 87.0 80.0 - 99.9 fL    MCH 28.2 26.0 - 35.0 pg    MCHC 32.4 32.0 - 34.5 %    RDW 14.9 11.5 - 15.0 fL    Platelets 254 302 - 729 E9/L    MPV 8.7 7.0 - 12.0 fL    Neutrophils % 67.4 43.0 - 80.0 % Immature Granulocytes % 0.4 0.0 - 5.0 %    Lymphocytes % 23.3 20.0 - 42.0 %    Monocytes % 7.1 2.0 - 12.0 %    Eosinophils % 0.9 0.0 - 6.0 %    Basophils % 0.9 0.0 - 2.0 %    Neutrophils Absolute 7.83 (H) 1.80 - 7.30 E9/L    Immature Granulocytes # 0.05 E9/L    Lymphocytes Absolute 2.72 1.50 - 4.00 E9/L    Monocytes Absolute 0.83 0.10 - 0.95 E9/L    Eosinophils Absolute 0.11 0.05 - 0.50 E9/L    Basophils Absolute 0.11 0.00 - 0.20 E9/L   Comprehensive Metabolic Panel w/ Reflex to MG   Result Value Ref Range    Sodium 123 (L) 132 - 146 mmol/L    Potassium reflex Magnesium 6.1 (H) 3.5 - 5.0 mmol/L    Chloride 82 (L) 98 - 107 mmol/L    CO2 27 22 - 29 mmol/L    Anion Gap 14 7 - 16 mmol/L    Glucose 188 (H) 74 - 99 mg/dL    BUN 50 (H) 8 - 23 mg/dL    CREATININE 2.8 (H) 0.5 - 1.0 mg/dL    GFR Non-African American 17 >=60 mL/min/1.73    GFR African American 20     Calcium 9.5 8.6 - 10.2 mg/dL    Total Protein 8.9 (H) 6.4 - 8.3 g/dL    Albumin 3.0 (L) 3.5 - 5.2 g/dL    Total Bilirubin 1.1 0.0 - 1.2 mg/dL    Alkaline Phosphatase 101 35 - 104 U/L    ALT 17 0 - 32 U/L    AST 51 (H) 0 - 31 U/L   Troponin   Result Value Ref Range    Troponin 0.05 (H) 0.00 - 0.03 ng/mL   APTT   Result Value Ref Range    aPTT 38.2 (H) 24.5 - 35.1 sec   Protime-INR   Result Value Ref Range    Protime 22.3 (H) 9.3 - 12.4 sec    INR 2.0    Lactate, Sepsis   Result Value Ref Range    Lactic Acid, Sepsis 1.6 0.5 - 1.9 mmol/L   EKG 12 Lead   Result Value Ref Range    Ventricular Rate 79 BPM    Atrial Rate 79 BPM    P-R Interval 168 ms    QRS Duration 168 ms    Q-T Interval 452 ms    QTc Calculation (Bazett) 518 ms    P Axis 44 degrees    R Axis -25 degrees    T Axis 102 degrees       RADIOLOGY:  CT HEAD WO CONTRAST   Final Result   No acute intracranial abnormality. CT ABDOMEN PELVIS WO CONTRAST Additional Contrast? Oral   Final Result   1.   Relatively recent postoperative changes with incomplete closure of the   subcutaneous soft tissues overlying the linea alba of the anterior rectus   abdominal muscle across the midline. 2.  Multiple previous bowel surgery with overall shortening of the bowel. 3.  Presence of a right and left-sided patent ostomy, ileostomy on the right,   jejunostomy on the left. 4.  More late subcapsular hematoma of the right lobe of the liver 7.7 x 2 x   4.5 cm.   5.  Status post cholecystectomy, no dilatation biliary tree pancreatic ductal   system. 6.  No obstructive uropathy. 7.  No acute inflammatory changes in the mid mesentery fat planes, free   intraperitoneal air or ascites or indication for bowel obstruction. XR CHEST PORTABLE   Final Result   Faint reticular airspace opacities at both lung bases may be on the basis of   pulmonary edema or atypical/viral pneumonia.          ------------------------- NURSING NOTES AND VITALS REVIEWED ---------------------------  Date / Time Roomed:  3/2/2021 12:30 PM  ED Bed Assignment:  18A/18A-18    The nursing notes within the ED encounter and vital signs as below have been reviewed. Patient Vitals for the past 24 hrs:   BP Temp Temp src Pulse Resp SpO2 Height Weight   03/02/21 1610 116/61 -- -- 89 -- -- -- --   03/02/21 1526 133/84 -- -- -- -- -- -- --   03/02/21 1515 -- -- -- 96 22 -- -- --   03/02/21 1330 (!) 142/105 -- -- 80 18 -- -- --   03/02/21 1300 (!) 101/50 -- -- 83 16 -- -- --   03/02/21 1236 (!) 95/58 97.1 °F (36.2 °C) Infrared 81 20 98 % 5' 2\" (1.575 m) 188 lb (85.3 kg)       Oxygen Saturation Interpretation: Normal    ------------------------------------------ PROGRESS NOTES ------------------------------------------    Counseling:  I have spoken with the patient and discussed todays results, in addition to providing specific details for the plan of care and counseling regarding the diagnosis and prognosis.   Their questions are answered at this time and they are agreeable with the plan of admission.    --------------------------------- ADDITIONAL PROVIDER NOTES ---------------------------------  Consultations:  Time: 1640. Spoke with Dr. Megan Harley. Discussed case. They will admit the patient. This patient's ED course included: a personal history and physicial examination and re-evaluation prior to disposition    This patient has remained hemodynamically stable during their ED course. Diagnosis:  1. Acute kidney injury ([address]    2. Acute hyperkalemia    3. Acute hyponatremia    4. Hypotension, unspecified hypotension type        Disposition:  Patient's disposition: Admit to telemetry  Patient's condition is stable. Patient was seen and evaluated by myself and my attending Cherri Stoll MD. Assessment and Plan discussed with attending provider, please see attestation for final plan of care.      Marimar Marin, DO        Christa Figures, DO  Resident  03/02/21 [id number]

## 2021-03-02 NOTE — ED PROVIDER NOTES
ATTENDING PROVIDER ATTESTATION:     Carlos Gonzales presented to the emergency department for evaluation of Altered Mental Status (LETHARGIC/HYPOTENSIVE AT [address])   and was initially evaluated by the Medical Resident. See Original ED Note for H&P and ED course above. I have reviewed and discussed the case, including pertinent history (medical, surgical, family and social) and exam findings with the Medical Resident assigned to Carlos Gonzales. I have personally performed and/or participated in the history, exam, medical decision making, and procedures and agree with all pertinent clinical information and any additional changes or corrections are noted below in my assessment and plan. I have discussed this patient in detail with the resident, and provided the instruction and education,       I have reviewed my findings and recommendations with the assigned Medical Resident, Carlos Gonzales and members of family present at the time of disposition. I have performed a history and physical examination of this patient and directly supervised the resident caring for this patient      History of Present Illness:    Presents to the ED for hypotension, beginning prior to arrival.  The complaint has been intermittent, severe in severity, and worsened by nothing. Was sent from the nursing facility for hypotension. They report her blood pressure was in the 28U systolic. On arrival it is 142. She denies complaints. She has a recent complex history in which she has multiple abdominal surgeries and a colostomy in the fall of 2020. Says she has been doing ok recently. Has an open abdominal wound that has been healing. She denies bleeding. Still having ostomy output. She is anticoagulated for hx of thrombophilia. No chest pain or shortness of breath. She is not altered here. Her BP has improved prior to arrival. She is awake and alert, not confused or lethargic.          Review of Systems:   A complete review dry. No rashes. Neurologic: GCS 15, no focal deficits  Psychiatric: Normal Affect      I directly supervised any procedures performed by the resident and was present for the procedure including all critical portions of the procedure      The cardiac monitor revealed sinus rhythm with a heart rate in the 80s as interpreted by me. The cardiac monitor was ordered secondary to the patient's hypotension and to monitor the patient for dysrhythmia. CPT [id number]      I, Dr. Araseli Salcedo, am the primary provider of record    My Medical Decision Making:         COLTON, appears prerenal/looks dry  Also with life threatening hyperkalemia, K 6, treated medically to prevent life threatening deterioration  STAT nephrology consult, I spoke with [place], he will provide consultation  BP improving with IVF  Dr. Chau January for admission    CRITICAL CARE:  Please note that the withdrawal or failure to initiate urgent interventions for this patient would likely result in a life threatening deterioration or permanent disability. Accordingly this patient received 30 minutes of critical care time, including coordination of care, and direct bedside care and excluding separately billable procedures. 1. Acute kidney injury ([facility] [address]    2. Acute hyperkalemia    3.  Acute hyponatremia           Anna Gracia MD  03/02/21 [id number]

## 2021-03-02 NOTE — CONSULTS
Department of Internal Medicine  Nephrology Consult Note      Reason for Consult: Hyponatremia, hyperkalemia, COLTON  Requesting Physician: Dr. Brand Killer: Hypotension, lethargy, increased output from colostomy    History Obtained From:  patient, family member -daughter    HISTORY OF PRESENT ILLNESS:  Briefly Yun Bueno is 79 y.o. female with history of colon cancer (s/p right hemicolectomy, small bowel enterotomy, repair than resection, creation of loop proximal ileostomy and loop distal ileal mucous fistula; complication with abdominal abscess s/p drainage removal), trach decannulated, hypertension (carvedilol and hydralazine), factor V Leiden with chronic DVT of bilateral lower extremity (Eliquis, plan to switch to 2.5 mg twice daily after April 2), type 2 diabetes mellitus (metformin 1000 mg twice daily), history of COPD, who was admitted on 3/2/2021 from skilled nursing facility for complaints of hypotension and altered mental status. Per patient's daughter her blood pressure was 60 / 20 mmHg in the morning, EMS was called who gave her IV fluids and her blood pressure was 90 x 40 mmHg on arrival to the ED. Patient complained that she is continued to have increased output from her ileostomy over the past couple of days, has been drinking water due to feeling thirsty and chewing on ice cubes. In the ED, initial evaluation showed patient to have hyponatremia with sodium of 123 meq/L, hyperkalemia with potassium of 6.1 MEQ/L, low chloride 82 mmol/L, BUN/creatinine of 50/2.8. Nephrology was consulted for electrolyte abnormality and COLTON.                  Past Medical History:        Diagnosis Date    Hyperlipidemia     Hypertension     LBBB (left bundle branch block)     Obesity      Past Surgical History:        Procedure Laterality Date    APPENDECTOMY      CHOLECYSTECTOMY      COLOSTOMY  2007    due to divertiulosis since than it was reversed    HERNIA REPAIR      HYSTERECTOMY, TOTAL ABDOMINAL  1990     Current Medications:    Current Facility-Administered Medications: 0.9 % sodium chloride bolus, 1,000 mL, Intravenous, Once  hydrocortisone sodium succinate PF (SOLU-CORTEF) injection 100 mg, 100 mg, Intravenous, Once  piperacillin-tazobactam (ZOSYN) 4,500 mg in sodium chloride 0.9 % 100 mL IVPB (mini-bag), 4,500 mg, Intravenous, Once  sodium chloride flush 0.9 % injection 10 mL, 10 mL, Intravenous, Once  sodium polystyrene (KAYEXALATE) 15 GM/60ML suspension 15 g, 15 g, Oral, Once  0.9 % sodium chloride infusion, , Intravenous, Continuous  Allergies:  Amlodipine, Cefdinir, E-mycin [erythromycin], Lisinopril, and Seasonal    Social History:    Social History     Socioeconomic History    Marital status:      Spouse name: Not on file    Number of children: Not on file    Years of education: Not on file    Highest education level: Not on file   Occupational History    Not on file   Social Needs    Financial resource strain: Not on file    Food insecurity     Worry: Not on file     Inability: Not on file    Transportation needs     Medical: Not on file     Non-medical: Not on file   Tobacco Use    Smoking status: Former Smoker     Packs/day: 1.00     Years: 40.00     Pack years: 40.00     Types: Cigarettes     Quit date: 2008     Years since quittin.1    Smokeless tobacco: Never Used   Substance and Sexual Activity    Alcohol use: Yes     Comment: occassionally    Drug use: No    Sexual activity: Not Currently   Lifestyle    Physical activity     Days per week: Not on file     Minutes per session: Not on file    Stress: Not on file   Relationships    Social connections     Talks on phone: Not on file     Gets together: Not on file     Attends Gnosticist service: Not on file     Active member of club or organization: Not on file     Attends meetings of clubs or organizations: Not on file     Relationship status: Not on file    Intimate partner violence     Fear of current or ex partner: Not on file     Emotionally abused: Not on file     Physically abused: Not on file     Forced sexual activity: Not on file   Other Topics Concern    Not on file   Social History Narrative    Not on file       Family History:       Problem Relation Age of Onset    Cancer Mother     Diabetes Father     Kidney Disease Father        REVIEW OF SYSTEMS:    · Constitutional: No fever, no chills, no change in weight; good appetite  · HEENT: No blurred vision, no ear problems, no sore throat, no rhinorrhea. · Respiratory: No cough, no sputum production, no pleuritic chest pain, no shortness of breath  · Cardiology: No angina, no dyspnea on exertion, no paroxysmal nocturnal dyspnea, no orthopnea, no palpitation, no leg swelling. · Gastroenterology: No dysphagia, no reflux; no abdominal pain, no nausea or vomiting; no constipation.  No hematochezia ; increased output (watery) from colostomy  · Genitourinary: No dysuria, no frequency, hesitancy; no hematuria  · Musculoskeletal: no joint pain, no myalgia, no change in range of movement  · Neurology: no focal weakness in extremities, no slurred speech, no double vision, no tingling or numbness sensation  · Endocrinology: no temperature intolerance, no polyphagia, polydipsia or polyuria  · Hematology: no increased bleeding, no bruising, no lymphadenopathy  · Skin: no skin changes noticed by patient  · Psychology: no depressed mood, no suicidal ideation      Physical Exam     · Vitals: /61   Pulse 89   Temp 97.1 °F (36.2 °C) (Infrared)   Resp 22   Ht 5' 2\" (1.575 m)   Wt 188 lb (85.3 kg)   SpO2 98%   BMI 34.39 kg/m²   · 24HR INTAKE/OUTPUT:  No intake or output data in the 24 hours ending 03/02/21 1704    · General Appearance: alert and oriented to person, place and time, well developed and well- nourished, in no acute distress  · Skin: warm and dry, no rash or erythema  · Head: normocephalic and atraumatic  · Eyes: pupils equal, round, and Results   Component Value Date    APTT 38.2 03/02/2021     HgBA1c:  No components found for: HGBA1C  Iron Studies:    Lab Results   Component Value Date    TIBC 467 11/06/2020    FERRITIN 31 11/06/2020     VITAMIN B12: No components found for: B12  FOLATE:    Lab Results   Component Value Date    FOLATE 11.4 10/20/2020           EKG: normal sinus rhythm, LBBB, tall T waves. Imaging Studies      Ct Abdomen Pelvis Wo Contrast Additional Contrast? Oral    Result Date: 3/2/2021  EXAMINATION: CT OF THE ABDOMEN AND PELVIS WITHOUT CONTRAST 3/2/2021 3:44 pm TECHNIQUE: CT of the abdomen and pelvis was performed without the administration of intravenous contrast. Multiplanar reformatted images are provided for review. Dose modulation, iterative reconstruction, and/or weight based adjustment of the mA/kV was utilized to reduce the radiation dose to as low as reasonably achievable. COMPARISON: Previous study of a March 22, 2016 HISTORY: ORDERING SYSTEM PROVIDED HISTORY: hypotension, recent surgery TECHNOLOGIST PROVIDED HISTORY: Reason for exam:->hypotension, recent surgery Additional Contrast?->Oral What reading provider will be dictating this exam?->CRC FINDINGS: Patient had the relative recent abdominal surgery with exposure of the preperitoneal linea Alba of the midline anterior rectus abdominal muscle not cover by subcutaneous soft tissue. The there is no significant inflammatory changes of the adjacent fat planes. Patient had previous bowel surgery, there is a stump for the rectum which appears unremarkable. The surgical bowel sutures are present in the right side. There is an overall shortening of the bowel due multiple resections. Presence of bilateral ostomy. On the left side relates with the jejunal loop and on the right-side relates with a ileal loop more likely. The oral contrast given has not reached the right or left side ileostomy openings.  There is no indication for bowel obstruction although there is some dilatation and air-fluid levels on the jejunal segments with oral contrast. There is no free intraperitoneal air. There is no acute inflammatory changes the omental/mesenteric fat planes. The There is normal size for the liver but there is a subcapsular fluid accumulation along the lateral margin of the right lobe of the liver of relative low density in relation to the liver parenchyma measuring 7.7 cm in the anterior to posterior diameter by 2 cm in thickness by 4.5 cm in the superior to inferior diameter. This can represent a more late subacute the subcapsular hematoma of the right lobe of the liver. Patient had previous cholecystectomy. The biliary tree and pancreatic ductal systems are not dilated. There is atrophy and fat replacement of the pancreas. The The spleen has mildly increased size. A the adrenals do not appear to be enlarged. Kidneys are preserved size and cortical thickness, there is no obstruction or renal calculus. Atherosclerotic changes are seen in the abdominal aorta but there is no aneurysm formation. Patient had previous hysterectomy. The ovaries are not seen. Bladder has unremarkable appearance. Lower lung bases demonstrates some areas of linear atelectasis. There is no pleural effusions. The Degenerative changes are seen in the thoracolumbar spine with old the compression deformities forming anterior wedging measuring. 1.  Relatively recent postoperative changes with incomplete closure of the subcutaneous soft tissues overlying the linea alba of the anterior rectus abdominal muscle across the midline. 2.  Multiple previous bowel surgery with overall shortening of the bowel. 3.  Presence of a right and left-sided patent ostomy, ileostomy on the right, jejunostomy on the left. 4.  More late subcapsular hematoma of the right lobe of the liver 7.7 x 2 x 4.5 cm. 5.  Status post cholecystectomy, no dilatation biliary tree pancreatic ductal system. 6.  No obstructive uropathy.  7.  No acute inflammatory changes in the mid mesentery fat planes, free intraperitoneal air or ascites or indication for bowel obstruction. Ct Head Wo Contrast    Result Date: 3/2/2021  EXAMINATION: CT OF THE HEAD WITHOUT CONTRAST  3/2/2021 3:44 pm TECHNIQUE: CT of the head was performed without the administration of intravenous contrast. Dose modulation, iterative reconstruction, and/or weight based adjustment of the mA/kV was utilized to reduce the radiation dose to as low as reasonably achievable. COMPARISON: None. HISTORY: ORDERING SYSTEM PROVIDED HISTORY: ams TECHNOLOGIST PROVIDED HISTORY: Reason for exam:->ams Has a \"code stroke\" or \"stroke alert\" been called? ->No Decision Support Exception->Emergency Medical Condition (MA) What reading provider will be dictating this exam?->CRC FINDINGS: BRAIN/VENTRICLES: There is no acute intracranial hemorrhage, mass effect or midline shift. No abnormal extra-axial fluid collection. The gray-white differentiation is maintained without evidence of an acute infarct. There is no evidence of hydrocephalus. ORBITS: The visualized portion of the orbits demonstrate no acute abnormality. SINUSES: The visualized paranasal sinuses and mastoid air cells demonstrate no acute abnormality. SOFT TISSUES/SKULL:  No acute abnormality of the visualized skull or soft tissues. No acute intracranial abnormality. Xr Chest Portable    Result Date: 3/2/2021  EXAMINATION: ONE XRAY VIEW OF THE CHEST 3/2/2021 1:53 pm COMPARISON: March 22, 2016 HISTORY: ORDERING SYSTEM PROVIDED HISTORY: hypotension TECHNOLOGIST PROVIDED HISTORY: Reason for exam:->hypotension What reading provider will be dictating this exam?->CRC FINDINGS: Heart size is within normal limits. There are faint reticular airspace opacities at both lung bases, right greater than left. No evidence of a sizable pleural effusion. No pneumothorax. A leftward scoliosis of the thoracic spine is noted.   Bones are mildly osteopenic. Faint reticular airspace opacities at both lung bases may be on the basis of pulmonary edema or atypical/viral pneumonia. IMPRESSION / RECOMMENDATIONS:     Briefly Isis Madera is 79 y.o. female with history of colon cancer (s/p right hemicolectomy, small bowel enterotomy, repair than resection, creation of loop proximal ileostomy and loop distal ileal mucous fistula; complication with abdominal abscess s/p drainage removal), trach decannulated, hypertension (carvedilol and hydralazine), factor V Leiden with chronic DVT of bilateral lower extremity (Eliquis, plan to switch to 2.5 mg twice daily after April 2), type 2 diabetes mellitus (metformin 1000 mg twice daily), history of COPD, who was admitted on 3/2/2021 from skilled nursing facility for complaints of hypotension and altered mental status. Per patient's daughter her blood pressure was 60 / 20 mmHg in the morning, EMS was called who gave her IV fluids and her blood pressure was 90 x 40 mmHg on arrival to the ED. Patient complained that she is continued to have increased output from her ileostomy over the past couple of days, has been drinking water due to feeling thirsty and chewing on ice cubes. In the ED, initial evaluation showed patient to have hyponatremia with sodium of 123 meq/L, hyperkalemia with potassium of 6.1 MEQ/L, low chloride 82 mmol/L, BUN/creatinine of 50/2.8. Nephrology was consulted for electrolyte abnormality and COLTON. 1. COLTON stage III, volume responsive prerenal COLTON, 2/2 increased output from colostomy with decreased intake. Intravascular volume repletion with normal saline bolus in the ED. Normal saline infusion to be continued at 75 mL/hour. To check for urine indices. 2. Hypovolemic hypoosmolar hyponatremia, likely 2/2 increased solute rich water loss from the colostomy combined with free water intake, and decreased GFR.   To continue normal saline infusion at 75 mL/hr and restrict free water intake to 1 L/24hr. To obtain urine osmolality along with urine indices. Strict I/O.    3. Hyperkalemia 2/2 COLTON (decreased GFR), continue volume resuscitation, low potassium diet. 4. Hx of hypertension. Currently normotensive. To hold carvedilol and hydralazine for now. 5. Type 2 diabetes mellitus on Metformin. To use insulin while inpatient. Hold Metformin. 6. Hx of colon cancer s/p hemicolectomy, left-sided colostomy and right-sided mucous fistula. Increased watery output from colostomy, to monitor output. 7. Hypercoagulable state with factor V Leiden. Continue apixaban. Plan:     · Obtain urine electrolytes, urine osmolality, urine creatinine. Calculate FeNa. · Continue normal saline infusion at 75 mL an hour. · Restrict free water intake to 1 L / 24-hour. · Strict I/O along with output from the colostomy. · To call nephrology on call if urine output greater than 100 mL in 1 hour. · Low potassium diet. · Continue apixaban  · Monitor BMP    Thank you very much Dr. Bennett Cornelius  for allowing us to participate in the care of Jason Chinchilla.        Electronically signed by Wilfredo Morales MD on 3/2/2021 at 5:04 PM\

## 2021-03-03 PROBLEM — I95.9 HYPOTENSION: Status: ACTIVE | Noted: 2021-03-03

## 2021-03-03 PROBLEM — A41.9 SEPSIS (HCC): Status: ACTIVE | Noted: 2021-03-03

## 2021-03-03 PROBLEM — E87.5 HYPERKALEMIA: Status: ACTIVE | Noted: 2021-03-03

## 2021-03-03 PROBLEM — N39.0 UTI (URINARY TRACT INFECTION): Status: ACTIVE | Noted: 2021-03-03

## 2021-03-03 PROBLEM — R94.31 PROLONGED Q-T INTERVAL ON ECG: Status: ACTIVE | Noted: 2021-03-03

## 2021-03-03 LAB
ANION GAP SERPL CALCULATED.3IONS-SCNC: 10 MMOL/L (ref 7–16)
ANION GAP SERPL CALCULATED.3IONS-SCNC: 11 MMOL/L (ref 7–16)
ANION GAP SERPL CALCULATED.3IONS-SCNC: 11 MMOL/L (ref 7–16)
ANION GAP SERPL CALCULATED.3IONS-SCNC: 13 MMOL/L (ref 7–16)
ANION GAP SERPL CALCULATED.3IONS-SCNC: 13 MMOL/L (ref 7–16)
BUN BLDV-MCNC: 36 MG/DL (ref 8–23)
BUN BLDV-MCNC: 37 MG/DL (ref 8–23)
BUN BLDV-MCNC: 39 MG/DL (ref 8–23)
BUN BLDV-MCNC: 41 MG/DL (ref 8–23)
BUN BLDV-MCNC: 42 MG/DL (ref 8–23)
BUN BLDV-MCNC: 43 MG/DL (ref 8–23)
BUN BLDV-MCNC: 46 MG/DL (ref 8–23)
CALCIUM SERPL-MCNC: 8 MG/DL (ref 8.6–10.2)
CALCIUM SERPL-MCNC: 8.4 MG/DL (ref 8.6–10.2)
CALCIUM SERPL-MCNC: 8.5 MG/DL (ref 8.6–10.2)
CALCIUM SERPL-MCNC: 8.9 MG/DL (ref 8.6–10.2)
CALCIUM SERPL-MCNC: 9 MG/DL (ref 8.6–10.2)
CALCIUM SERPL-MCNC: 9.4 MG/DL (ref 8.6–10.2)
CALCIUM SERPL-MCNC: 9.8 MG/DL (ref 8.6–10.2)
CHLORIDE BLD-SCNC: 89 MMOL/L (ref 98–107)
CHLORIDE BLD-SCNC: 89 MMOL/L (ref 98–107)
CHLORIDE BLD-SCNC: 91 MMOL/L (ref 98–107)
CHLORIDE BLD-SCNC: 92 MMOL/L (ref 98–107)
CHLORIDE BLD-SCNC: 93 MMOL/L (ref 98–107)
CHLORIDE BLD-SCNC: 94 MMOL/L (ref 98–107)
CHLORIDE BLD-SCNC: 95 MMOL/L (ref 98–107)
CO2: 20 MMOL/L (ref 22–29)
CO2: 22 MMOL/L (ref 22–29)
CO2: 26 MMOL/L (ref 22–29)
CO2: 27 MMOL/L (ref 22–29)
CO2: 27 MMOL/L (ref 22–29)
CO2: 28 MMOL/L (ref 22–29)
CO2: 29 MMOL/L (ref 22–29)
CREAT SERPL-MCNC: 1.6 MG/DL (ref 0.5–1)
CREAT SERPL-MCNC: 1.7 MG/DL (ref 0.5–1)
CREAT SERPL-MCNC: 1.8 MG/DL (ref 0.5–1)
CREAT SERPL-MCNC: 1.9 MG/DL (ref 0.5–1)
CREAT SERPL-MCNC: 2 MG/DL (ref 0.5–1)
CREAT SERPL-MCNC: 2 MG/DL (ref 0.5–1)
CREAT SERPL-MCNC: 2.3 MG/DL (ref 0.5–1)
EKG ATRIAL RATE: 79 BPM
EKG P AXIS: 44 DEGREES
EKG P-R INTERVAL: 168 MS
EKG Q-T INTERVAL: 452 MS
EKG QRS DURATION: 168 MS
EKG QTC CALCULATION (BAZETT): 518 MS
EKG R AXIS: -25 DEGREES
EKG T AXIS: 102 DEGREES
EKG VENTRICULAR RATE: 79 BPM
GFR AFRICAN AMERICAN: 25
GFR AFRICAN AMERICAN: 30
GFR AFRICAN AMERICAN: 30
GFR AFRICAN AMERICAN: 32
GFR AFRICAN AMERICAN: 34
GFR AFRICAN AMERICAN: 36
GFR AFRICAN AMERICAN: 38
GFR NON-AFRICAN AMERICAN: 21 ML/MIN/1.73
GFR NON-AFRICAN AMERICAN: 25 ML/MIN/1.73
GFR NON-AFRICAN AMERICAN: 25 ML/MIN/1.73
GFR NON-AFRICAN AMERICAN: 26 ML/MIN/1.73
GFR NON-AFRICAN AMERICAN: 28 ML/MIN/1.73
GFR NON-AFRICAN AMERICAN: 30 ML/MIN/1.73
GFR NON-AFRICAN AMERICAN: 32 ML/MIN/1.73
GLUCOSE BLD-MCNC: 125 MG/DL (ref 74–99)
GLUCOSE BLD-MCNC: 149 MG/DL (ref 74–99)
GLUCOSE BLD-MCNC: 154 MG/DL (ref 74–99)
GLUCOSE BLD-MCNC: 154 MG/DL (ref 74–99)
GLUCOSE BLD-MCNC: 188 MG/DL (ref 74–99)
GLUCOSE BLD-MCNC: 86 MG/DL (ref 74–99)
GLUCOSE BLD-MCNC: 99 MG/DL (ref 74–99)
MAGNESIUM: 1.6 MG/DL (ref 1.6–2.6)
MAGNESIUM: 1.8 MG/DL (ref 1.6–2.6)
METER GLUCOSE: 130 MG/DL (ref 74–99)
METER GLUCOSE: 136 MG/DL (ref 74–99)
METER GLUCOSE: 182 MG/DL (ref 74–99)
OSMOLALITY: 289 MOSM/KG (ref 285–310)
PHOSPHORUS: 4.5 MG/DL (ref 2.5–4.5)
PHOSPHORUS: 4.6 MG/DL (ref 2.5–4.5)
POTASSIUM SERPL-SCNC: 3.7 MMOL/L (ref 3.5–5)
POTASSIUM SERPL-SCNC: 3.7 MMOL/L (ref 3.5–5)
POTASSIUM SERPL-SCNC: 3.9 MMOL/L (ref 3.5–5)
POTASSIUM SERPL-SCNC: 3.9 MMOL/L (ref 3.5–5)
POTASSIUM SERPL-SCNC: 4.3 MMOL/L (ref 3.5–5)
POTASSIUM SERPL-SCNC: 4.4 MMOL/L (ref 3.5–5)
POTASSIUM SERPL-SCNC: 4.8 MMOL/L (ref 3.5–5)
PROCALCITONIN: 0.16 NG/ML (ref 0–0.08)
SODIUM BLD-SCNC: 125 MMOL/L (ref 132–146)
SODIUM BLD-SCNC: 126 MMOL/L (ref 132–146)
SODIUM BLD-SCNC: 127 MMOL/L (ref 132–146)
SODIUM BLD-SCNC: 128 MMOL/L (ref 132–146)
SODIUM BLD-SCNC: 130 MMOL/L (ref 132–146)
SODIUM BLD-SCNC: 131 MMOL/L (ref 132–146)
SODIUM BLD-SCNC: 133 MMOL/L (ref 132–146)

## 2021-03-03 PROCEDURE — 6370000000 HC RX 637 (ALT 250 FOR IP): Performed by: INTERNAL MEDICINE

## 2021-03-03 PROCEDURE — 6360000002 HC RX W HCPCS: Performed by: INTERNAL MEDICINE

## 2021-03-03 PROCEDURE — 87040 BLOOD CULTURE FOR BACTERIA: CPT

## 2021-03-03 PROCEDURE — 93010 ELECTROCARDIOGRAM REPORT: CPT | Performed by: INTERNAL MEDICINE

## 2021-03-03 PROCEDURE — 80048 BASIC METABOLIC PNL TOTAL CA: CPT

## 2021-03-03 PROCEDURE — 36415 COLL VENOUS BLD VENIPUNCTURE: CPT

## 2021-03-03 PROCEDURE — 82962 GLUCOSE BLOOD TEST: CPT

## 2021-03-03 PROCEDURE — 87186 SC STD MICRODIL/AGAR DIL: CPT

## 2021-03-03 PROCEDURE — 2580000003 HC RX 258: Performed by: INTERNAL MEDICINE

## 2021-03-03 PROCEDURE — 84145 PROCALCITONIN (PCT): CPT

## 2021-03-03 PROCEDURE — 94640 AIRWAY INHALATION TREATMENT: CPT

## 2021-03-03 PROCEDURE — 94664 DEMO&/EVAL PT USE INHALER: CPT

## 2021-03-03 PROCEDURE — 6370000000 HC RX 637 (ALT 250 FOR IP): Performed by: STUDENT IN AN ORGANIZED HEALTH CARE EDUCATION/TRAINING PROGRAM

## 2021-03-03 PROCEDURE — 84100 ASSAY OF PHOSPHORUS: CPT

## 2021-03-03 PROCEDURE — 87070 CULTURE OTHR SPECIMN AEROBIC: CPT

## 2021-03-03 PROCEDURE — 83930 ASSAY OF BLOOD OSMOLALITY: CPT

## 2021-03-03 PROCEDURE — 2060000000 HC ICU INTERMEDIATE R&B

## 2021-03-03 PROCEDURE — 83735 ASSAY OF MAGNESIUM: CPT

## 2021-03-03 RX ORDER — MAGNESIUM SULFATE IN WATER 40 MG/ML
2000 INJECTION, SOLUTION INTRAVENOUS ONCE
Status: COMPLETED | OUTPATIENT
Start: 2021-03-03 | End: 2021-03-03

## 2021-03-03 RX ORDER — DEXTROSE MONOHYDRATE 50 MG/ML
100 INJECTION, SOLUTION INTRAVENOUS PRN
Status: DISCONTINUED | OUTPATIENT
Start: 2021-03-03 | End: 2021-03-11 | Stop reason: HOSPADM

## 2021-03-03 RX ORDER — CHOLESTYRAMINE 4 G/9G
1 POWDER, FOR SUSPENSION ORAL 2 TIMES DAILY
Status: DISCONTINUED | OUTPATIENT
Start: 2021-03-03 | End: 2021-03-11 | Stop reason: HOSPADM

## 2021-03-03 RX ORDER — NICOTINE POLACRILEX 4 MG
15 LOZENGE BUCCAL PRN
Status: DISCONTINUED | OUTPATIENT
Start: 2021-03-03 | End: 2021-03-11 | Stop reason: HOSPADM

## 2021-03-03 RX ORDER — DEXTROSE MONOHYDRATE 25 G/50ML
12.5 INJECTION, SOLUTION INTRAVENOUS PRN
Status: DISCONTINUED | OUTPATIENT
Start: 2021-03-03 | End: 2021-03-11 | Stop reason: HOSPADM

## 2021-03-03 RX ORDER — CALCIUM POLYCARBOPHIL 625 MG 625 MG/1
625 TABLET ORAL DAILY
Status: DISCONTINUED | OUTPATIENT
Start: 2021-03-03 | End: 2021-03-08

## 2021-03-03 RX ADMIN — CHOLESTYRAMINE 4 G: 4 POWDER, FOR SUSPENSION ORAL at 23:19

## 2021-03-03 RX ADMIN — APIXABAN 5 MG: 5 TABLET, FILM COATED ORAL at 09:21

## 2021-03-03 RX ADMIN — INSULIN LISPRO 1 UNITS: 100 INJECTION, SOLUTION INTRAVENOUS; SUBCUTANEOUS at 16:28

## 2021-03-03 RX ADMIN — FERROUS SULFATE TAB 325 MG (65 MG ELEMENTAL FE) 325 MG: 325 (65 FE) TAB at 09:21

## 2021-03-03 RX ADMIN — PIPERACILLIN AND TAZOBACTAM 3375 MG: 3; .375 INJECTION, POWDER, LYOPHILIZED, FOR SOLUTION INTRAVENOUS at 09:21

## 2021-03-03 RX ADMIN — BUDESONIDE 250 MCG: 0.25 SUSPENSION RESPIRATORY (INHALATION) at 19:45

## 2021-03-03 RX ADMIN — ARFORMOTEROL TARTRATE 15 MCG: 15 SOLUTION RESPIRATORY (INHALATION) at 19:45

## 2021-03-03 RX ADMIN — CALCIUM 500 MG: 500 TABLET ORAL at 09:21

## 2021-03-03 RX ADMIN — APIXABAN 5 MG: 5 TABLET, FILM COATED ORAL at 23:19

## 2021-03-03 RX ADMIN — CHOLESTYRAMINE 4 G: 4 POWDER, FOR SUSPENSION ORAL at 12:45

## 2021-03-03 RX ADMIN — CALCIUM 500 MG: 500 TABLET ORAL at 23:19

## 2021-03-03 RX ADMIN — MAGNESIUM SULFATE HEPTAHYDRATE 2000 MG: 40 INJECTION, SOLUTION INTRAVENOUS at 09:22

## 2021-03-03 RX ADMIN — CALCIUM POLYCARBOPHIL 625 MG: 625 TABLET, FILM COATED ORAL at 12:45

## 2021-03-03 RX ADMIN — PIPERACILLIN AND TAZOBACTAM 3375 MG: 3; .375 INJECTION, POWDER, LYOPHILIZED, FOR SOLUTION INTRAVENOUS at 16:26

## 2021-03-03 ASSESSMENT — PAIN SCALES - GENERAL: PAINLEVEL_OUTOF10: 0

## 2021-03-03 NOTE — ED NOTES
Nurse to nurse received from [facility], assumed care of patient at this time.       Negrito Oleary RN  03/02/21 8510

## 2021-03-03 NOTE — CARE COORDINATION
Pt from [facility]. Met pt at bedside and she confirmed plan is to return to facility at discharge. Spoke to [facility], pt is not a bed hold. They plan to accept back, will need updated therapy evals and a precert to return. Covid 3/2 (-). Ambulance form on soft chart. Pt has an open abd wound with packing. 2 ostomies. Gen surgery consulted. IVF started.  CM to follow    Kavitha EUGENEN, RN  [facility] Case Management  [phone]

## 2021-03-03 NOTE — PROGRESS NOTES
Department of Internal Medicine  Nephrology Progress Note      Events reviewed:  Sodium level is improving. Urine output is improving. Creatinine is improving. SUBJECTIVE: We are following Mrs. Banuelos for COLTON. Patient reports no new complaints.                 Physical Exam     · Vitals: BP (!) 147/65   Pulse 79   Temp 97.6 °F (36.4 °C) (Oral)   Resp 18   Ht 5' 2\" (1.575 m)   Wt 188 lb (85.3 kg)   SpO2 96%   BMI 34.39 kg/m²   · 24HR INTAKE/OUTPUT:  No intake or output data in the 24 hours ending 03/03/21 0913    · General Appearance: alert and oriented to person, place and time, well developed and well- nourished, in no acute distress  · Skin: warm and dry, no rash or erythema  · Head: normocephalic and atraumatic  · Eyes: pupils equal, round, and reactive to light, extraocular eye movements intact, conjunctivae normal  · ENT: tympanic membrane, external ear and ear canal normal bilaterally, nose without deformity, nasal mucosa and turbinates normal without polyps  · Neck: supple and non-tender without mass, no thyromegaly or thyroid nodules, no cervical lymphadenopathy  · Pulmonary/Chest: clear to auscultation bilaterally- no wheezes, rales or rhonchi, normal air movement, no respiratory distress  · Cardiovascular: normal rate, regular rhythm, normal S1 and S2, no murmurs, rubs, clicks, or gallops, distal pulses intact, no carotid bruits  · Abdomen: soft, non-tender, non-distended, normal bowel sounds, no masses or organomegaly; left colostomy bag with watery output, right mucous fistula with greenish mucus  · Extremities: no cyanosis, clubbing; minimal pitting edema of bilateral lower extremity   · musculoskeletal: normal range of motion, no joint swelling, deformity or tenderness  · Neurologic: reflexes normal and symmetric, no cranial nerve deficit, gait, coordination and speech normal     Scheduled Meds:   piperacillin-tazobactam  3,375 mg Intravenous Q8H    sodium chloride   Intravenous Q8H    insulin lispro  0-6 Units Subcutaneous TID     insulin lispro  0-3 Units Subcutaneous Nightly    polycarbophil  625 mg Oral Daily    cholestyramine  1 packet Oral BID    sodium chloride  1,000 mL Intravenous Once    sodium chloride flush  10 mL Intravenous Once    apixaban  5 mg Oral BID    calcium elemental  500 mg Oral BID    ferrous sulfate  325 mg Oral Daily with breakfast    budesonide  0.25 mg Nebulization BID    And    Arformoterol Tartrate  15 mcg Nebulization BID     Continuous Infusions:   dextrose      sodium chloride 75 mL/hr at 03/03/21 0640     PRN Meds:.glucose, dextrose, glucagon (rDNA), dextrose, albuterol, traMADol      Labs     CBC:   Lab Results   Component Value Date    WBC 11.7 03/02/2021    RBC 4.40 03/02/2021    HGB 12.4 03/02/2021    HCT 38.3 03/02/2021    MCV 87.0 03/02/2021    RDW 14.9 03/02/2021     03/02/2021     BMP:    Lab Results   Component Value Date     03/03/2021    K 3.9 03/03/2021    K 6.1 03/02/2021    CL 89 03/03/2021    CO2 29 03/03/2021    BUN 43 03/03/2021     HFP:    Lab Results   Component Value Date    PROT 8.9 03/02/2021     FLP:    Lab Results   Component Value Date    CHOL 93 10/06/2020    CHOL 129 05/26/2020    TRIG 69 10/06/2020    HDL 45 10/06/2020     U/A:  No components found for: Venia Gowers, USPGRAV, UPH, UPROTEIN, UGLUCOSE, UKETONE, UBILI, UBLOOD, UNITRITE, UUROBIL, [place], [facility], [place], [place], [place], [facility], [place]  TSH:    Lab Results   Component Value Date    TSH 3.73 10/06/2020     PT/INR:  No results found for: PTINR  PTT:    Lab Results   Component Value Date    APTT 38.2 03/02/2021     HgBA1c:  No components found for: HGBA1C  Iron Studies:    Lab Results   Component Value Date    TIBC 467 11/06/2020    FERRITIN 31 11/06/2020     VITAMIN B12: No components found for: B12  FOLATE:    Lab Results   Component Value Date    FOLATE 11.4 10/20/2020           EKG: normal sinus rhythm, LBBB, tall T waves.     Imaging Studies      Ct Abdomen Pelvis Wo Contrast Additional Contrast? Oral    Result Date: 3/2/2021    1. Relatively recent postoperative changes with incomplete closure of the subcutaneous soft tissues overlying the linea alba of the anterior rectus abdominal muscle across the midline. 2.  Multiple previous bowel surgery with overall shortening of the bowel. 3.  Presence of a right and left-sided patent ostomy, ileostomy on the right, jejunostomy on the left. 4.  More late subcapsular hematoma of the right lobe of the liver 7.7 x 2 x 4.5 cm. 5.  Status post cholecystectomy, no dilatation biliary tree pancreatic ductal system. 6.  No obstructive uropathy. 7.  No acute inflammatory changes in the mid mesentery fat planes, free intraperitoneal air or ascites or indication for bowel obstruction. Ct Head Wo Contrast    Result Date: 3/2/2021    No acute intracranial abnormality. Xr Chest Portable    Result Date: 3/2/2021    Faint reticular airspace opacities at both lung bases may be on the basis of pulmonary edema or atypical/viral pneumonia. BRIEF SUMMARY OF INITIAL CONSULT:     Briefly Rosi Martin is 79 y.o. female with history of colon cancer (s/p right hemicolectomy, small bowel enterotomy, repair than resection, creation of loop proximal ileostomy and loop distal ileal mucous fistula; complication with abdominal abscess s/p drainage removal), trach decannulated, hypertension (carvedilol and hydralazine), factor V Leiden with chronic DVT of bilateral lower extremity (Eliquis, plan to switch to 2.5 mg twice daily after April 2), type 2 diabetes mellitus (metformin 1000 mg twice daily), history of COPD, who was admitted on 3/2/2021 from skilled nursing facility for complaints of hypotension and altered mental status. Per patient's daughter her blood pressure was 60 / 20 mmHg in the morning, EMS was called who gave her IV fluids and her blood pressure was 90 x 40 mmHg on arrival to the ED.   Patient complained that she is continued to have increased output from her ileostomy over the past couple of days, has been drinking water due to feeling thirsty and chewing on ice cubes. In the ED, initial evaluation showed patient to have hyponatremia with sodium of 123 meq/L, hyperkalemia with potassium of 6.1 MEQ/L, low chloride 82 mmol/L, BUN/creatinine of 50/2.8. Nephrology was consulted for electrolyte abnormality and COLTON. IMPRESSION / RECOMMENDATIONS:       1. COLTON stage III, volume responsive prerenal COLTON, 2/2 increased output from colostomy with decreased intake. FeNa 0.4% supports prerenal component. Renal function improved with volume repletion. To continue normal saline infusion at 75 mL/hour. 2. Hypovolemic hypoosmolar hyponatremia, 2/2 increased solute rich water loss from the colostomy combined with free water intake, and decreased GFR. · Sodium level improved at adequate rate. No concern for overcorrection due to coexistent COLTON. To continue normal saline at 75 mL/h and free water restriction. 3. Hyperkalemia 2/2 COLTON (decreased GFR), resolved. K levels improved. 4. Hx of hypertension. Currently normotensive. To hold carvedilol and hydralazine for now.  ---------------------------------------------  5. UTI, on Zosyn per primary, urine culture to be followed. 6. Type 2 diabetes mellitus on Metformin. To use insulin while inpatient. Hold Metformin. 7. Hx of colon cancer s/p hemicolectomy, left-sided colostomy and right-sided mucous fistula. Increased watery output from colostomy, to monitor output. 8. Hypercoagulable state with factor V Leiden. Continue apixaban. Plan:     · Continue normal saline infusion at 75 mL an hour. · Continue fluid restriction,  1 L / 24-hour. · Strict I/O along with output from the colostomy. · To call nephrology on call if urine output greater than 100 mL in 1 hour. · Low potassium diet.   · Continue apixaban  · BMP every 4 hours

## 2021-03-03 NOTE — ED NOTES
Mcelroy cath order on prior shift, patient refusing mcelroy cath insertion at this time     Lazaro Cisse RN  03/02/21 [id number]

## 2021-03-03 NOTE — H&P
[address] [facility]  History and Physical      CHIEF COMPLAINT:    Chief Complaint   Patient presents with    Altered Mental Status     LETHARGIC/HYPOTENSIVE AT [address]        Patient of Sonia Prado MD presents with:  Acute kidney injury [facility])    History of Present Illness:   Patient underwent right-sided colectomy in November 2020 for colon cancer at [facility]. Afterwards the patient was peritoneal and was reexplored with findings of missed enterotomy which was repaired. After this second surgery experienced a cardiopulmonary arrest, ROSC was achieved and she was intubated. Patient was transferred from [place] to the [facility]. On 11/27 she underwent exploratory laparotomy with small bowel resection plus loop ileostomy plus distal mucous fistula and excision of mesh. Afterwards patient developed an abdominal abscess that required IR drainage on 12/3. Patient underwent a tracheostomy on 12/8. Patient has since been decannulated. The patient states that starting yesterday she felt very groggy and disoriented. She was residing at a nursing facility. Reportedly her blood pressure was 60/20. She was sent to our hospital where she was found to be hypotensive, but fluid responsive. She has been resuscitated. She feels much better today. She denies any pain. She says she has had some mild dysuria for a few weeks now. She denies any dizziness or lightheadedness at this point. There are no other associated symptoms. Denies any fevers or chills. She denies any shortness of breath. REVIEW OF SYSTEMS:  Pertinent negatives are above in HPI. 10 point ROS otherwise negative.       Past Medical History:   Diagnosis Date    Hyperlipidemia     Hypertension     LBBB (left bundle branch block)     Obesity          Past Surgical History:   Procedure Laterality Date    APPENDECTOMY      CHOLECYSTECTOMY      COLOSTOMY  2007    due to divertiulosis since than it was reversed    HERNIA REPAIR      HYSTERECTOMY, TOTAL ABDOMINAL  1990       Medications Prior to Admission:    Medications Prior to Admission: ferrous sulfate (IRON 325) 325 (65 Fe) MG tablet, ferrous sulfate Ferrous Sulfate Active 325 MG Oral Daily After A Meal 100 October 23rd, 2020 10:46am 10-  [facility] ([id number])  traMADol (ULTRAM) 50 MG tablet, Take 50 mg by mouth every 6 hours as needed for Pain. apixaban (ELIQUIS) 5 MG TABS tablet, Take 1 tablet by mouth 2 times daily  lovastatin (MEVACOR) 40 MG tablet, Take 1 tablet by mouth nightly  PROAIR  (90 Base) MCG/ACT inhaler, Inhale 1 puff into the lungs 4 times daily  Cholecalciferol (VITAMIN D3) 25 MCG (1000 UT) CAPS, Take by mouth daily  carvedilol (COREG) 25 MG tablet, TAKE 1 TABLET BY MOUTH TWICE DAILY WITH MEALS  alendronate (FOSAMAX) 70 MG tablet, Take 1 tablet by mouth once a week  metFORMIN (GLUCOPHAGE) 500 MG tablet, Take 2 tablets by mouth 2 times daily (Patient taking differently: Take 1,000 mg by mouth 4 times daily )  furosemide (LASIX) 20 MG tablet, Take 20 mg by mouth daily as needed  budesonide-formoterol (SYMBICORT) 80-4.5 MCG/ACT AERO, Inhale 2 puffs into the lungs 2 times daily (Patient taking differently: Inhale 2 puffs into the lungs as needed )  potassium chloride (KLOR-CON) 10 MEQ extended release tablet, Take 1 tablet by mouth every other day  hydrALAZINE (APRESOLINE) 50 MG tablet, Take 1 tablet by mouth 2 times daily  Calcium Carbonate (CALTRATE 600 PO), Take by mouth 2 times daily     Note that the patient's home medications were reviewed and the above list is accurate to the best of my knowledge at the time of the exam.    Allergies:    Amlodipine, Cefdinir, E-mycin [erythromycin], Lisinopril, and Seasonal    Social History:    reports that she quit smoking about 13 years ago. Her smoking use included cigarettes. She has a 40.00 pack-year smoking history.  She has never used smokeless tobacco. She reports current alcohol use. She reports that she does not use drugs. Family History:   family history includes Cancer in her mother; Diabetes in her father; Kidney Disease in her father. PHYSICAL EXAM:    Vitals:  BP (!) 147/65   Pulse 79   Temp 97.6 °F (36.4 °C) (Oral)   Resp 18   Ht 5' 2\" (1.575 m)   Wt 188 lb (85.3 kg)   SpO2 96%   BMI 34.39 kg/m²       General appearance: NAD, conversant  Eyes: Sclerae anicteric, PERRLA  HEENT: AT/NC, MMM  Neck: FROM, supple, no thyromegaly  Lymph: No cervical / supraclavicular lymphadenopathy  Lungs: Clear to auscultation, WOB normal  CV: RRR, 1/6 MAYDA, no lower extremity edema  Abdomen: Soft, non-tender; no masses or HSM, +BS. Bilateral ostomy appliances - the left is producing stool and the right is producing some mucoid substance. Extremities: FROM without synovitis. No clubbing or cyanosis of the hands. Skin: no rash, induration, lesions, or ulcers  Psych: Calm and cooperative. Normal judgement and insight. Normal mood and affect. Neuro: Alert and interactive, face symmetric, speech fluent. LABS:  All labs reviewed.   Of note:  CBC with Differential:    Lab Results   Component Value Date    WBC 11.7 03/02/2021    RBC 4.40 03/02/2021    HGB 12.4 03/02/2021    HCT 38.3 03/02/2021     03/02/2021    MCV 87.0 03/02/2021    MCH 28.2 03/02/2021    MCHC 32.4 03/02/2021    RDW 14.9 03/02/2021    NRBC 3 11/27/2020    SEGSPCT 87.1 11/26/2020    BANDSPCT 20.0 11/27/2020    METASPCT 3.0 11/27/2020    LYMPHOPCT 23.3 03/02/2021    MONOPCT 7.1 03/02/2021    BASOPCT 0.9 03/02/2021    MONOSABS 0.83 03/02/2021    LYMPHSABS 2.72 03/02/2021    EOSABS 0.11 03/02/2021    BASOSABS 0.11 03/02/2021     CMP:    Lab Results   Component Value Date     03/03/2021    K 3.9 03/03/2021    K 6.1 03/02/2021    CL 89 03/03/2021    CO2 29 03/03/2021    BUN 43 03/03/2021    CREATININE 2.0 03/03/2021    GFRAA 30 03/03/2021    AGRATIO 0.7 11/27/2020    LABGLOM 25 03/03/2021    GLUCOSE culture. Start antibiotics. Hold further stress dose steroids for now, unless blood pressure falls again    IV fluids    Hold hydralazine, carvedilol (high dose), and furosemide    Nephrology consult for severe hyponatremia - improving on slow IV fluids. Hyperkalemia improved w/hyperK protocol    Continue apixaban for FVL / DVT    SSI for DM2    Subcapsular hematoma noted. Surgery has been consulted. Hemoglobin stable. Monitor for now.     Code status: Full  Requires inpatient level of care  Daynephilipp Staples    8:17 AM  3/3/2021

## 2021-03-03 NOTE — ED NOTES
Report given stating pt alert, oriented, and continent. However, pt continues to be disoriented to place, time, and situation. Assessed pt, wearing brief that was saturated and strong odor. Per order placed on previous day and diagnosis of pt, mcelroy placed to maintain strict I/Os. Dark, yellow, cloudy urine noted. Pt cleaned and dry at this time.      Jasmyne Bach RN  03/03/21 [id number]

## 2021-03-04 ENCOUNTER — APPOINTMENT (OUTPATIENT)
Dept: MRI IMAGING | Age: 71
DRG: 871 | End: 2021-03-04
Payer: MEDICARE

## 2021-03-04 LAB
ANION GAP SERPL CALCULATED.3IONS-SCNC: 11 MMOL/L (ref 7–16)
ANION GAP SERPL CALCULATED.3IONS-SCNC: 6 MMOL/L (ref 7–16)
ANION GAP SERPL CALCULATED.3IONS-SCNC: 7 MMOL/L (ref 7–16)
BASOPHILS ABSOLUTE: 0.06 E9/L (ref 0–0.2)
BASOPHILS RELATIVE PERCENT: 1 % (ref 0–2)
BUN BLDV-MCNC: 23 MG/DL (ref 8–23)
BUN BLDV-MCNC: 30 MG/DL (ref 8–23)
BUN BLDV-MCNC: 34 MG/DL (ref 8–23)
CALCIUM SERPL-MCNC: 8.4 MG/DL (ref 8.6–10.2)
CALCIUM SERPL-MCNC: 8.8 MG/DL (ref 8.6–10.2)
CALCIUM SERPL-MCNC: 9.3 MG/DL (ref 8.6–10.2)
CHLORIDE BLD-SCNC: 94 MMOL/L (ref 98–107)
CHLORIDE BLD-SCNC: 95 MMOL/L (ref 98–107)
CHLORIDE BLD-SCNC: 96 MMOL/L (ref 98–107)
CO2: 27 MMOL/L (ref 22–29)
CO2: 30 MMOL/L (ref 22–29)
CO2: 30 MMOL/L (ref 22–29)
CREAT SERPL-MCNC: 1.1 MG/DL (ref 0.5–1)
CREAT SERPL-MCNC: 1.4 MG/DL (ref 0.5–1)
CREAT SERPL-MCNC: 1.5 MG/DL (ref 0.5–1)
EOSINOPHILS ABSOLUTE: 0.12 E9/L (ref 0.05–0.5)
EOSINOPHILS RELATIVE PERCENT: 2 % (ref 0–6)
GFR AFRICAN AMERICAN: 41
GFR AFRICAN AMERICAN: 45
GFR AFRICAN AMERICAN: 59
GFR NON-AFRICAN AMERICAN: 34 ML/MIN/1.73
GFR NON-AFRICAN AMERICAN: 37 ML/MIN/1.73
GFR NON-AFRICAN AMERICAN: 49 ML/MIN/1.73
GLUCOSE BLD-MCNC: 112 MG/DL (ref 74–99)
GLUCOSE BLD-MCNC: 146 MG/DL (ref 74–99)
GLUCOSE BLD-MCNC: 91 MG/DL (ref 74–99)
HCT VFR BLD CALC: 31.1 % (ref 34–48)
HEMOGLOBIN: 9.7 G/DL (ref 11.5–15.5)
IMMATURE GRANULOCYTES #: 0.01 E9/L
IMMATURE GRANULOCYTES %: 0.2 % (ref 0–5)
LYMPHOCYTES ABSOLUTE: 2.5 E9/L (ref 1.5–4)
LYMPHOCYTES RELATIVE PERCENT: 41.7 % (ref 20–42)
MAGNESIUM: 2 MG/DL (ref 1.6–2.6)
MCH RBC QN AUTO: 28.4 PG (ref 26–35)
MCHC RBC AUTO-ENTMCNC: 31.2 % (ref 32–34.5)
MCV RBC AUTO: 91.2 FL (ref 80–99.9)
METER GLUCOSE: 128 MG/DL (ref 74–99)
METER GLUCOSE: 143 MG/DL (ref 74–99)
METER GLUCOSE: 155 MG/DL (ref 74–99)
MONOCYTES ABSOLUTE: 0.58 E9/L (ref 0.1–0.95)
MONOCYTES RELATIVE PERCENT: 9.7 % (ref 2–12)
NEUTROPHILS ABSOLUTE: 2.73 E9/L (ref 1.8–7.3)
NEUTROPHILS RELATIVE PERCENT: 45.4 % (ref 43–80)
PDW BLD-RTO: 14.7 FL (ref 11.5–15)
PHOSPHORUS: 3.4 MG/DL (ref 2.5–4.5)
PLATELET # BLD: 194 E9/L (ref 130–450)
PMV BLD AUTO: 8.7 FL (ref 7–12)
POTASSIUM SERPL-SCNC: 3.4 MMOL/L (ref 3.5–5)
POTASSIUM SERPL-SCNC: 3.5 MMOL/L (ref 3.5–5)
POTASSIUM SERPL-SCNC: 3.6 MMOL/L (ref 3.5–5)
RBC # BLD: 3.41 E12/L (ref 3.5–5.5)
SODIUM BLD-SCNC: 130 MMOL/L (ref 132–146)
SODIUM BLD-SCNC: 132 MMOL/L (ref 132–146)
SODIUM BLD-SCNC: 134 MMOL/L (ref 132–146)
WBC # BLD: 6 E9/L (ref 4.5–11.5)

## 2021-03-04 PROCEDURE — 85025 COMPLETE CBC W/AUTO DIFF WBC: CPT

## 2021-03-04 PROCEDURE — 6360000002 HC RX W HCPCS: Performed by: INTERNAL MEDICINE

## 2021-03-04 PROCEDURE — 97535 SELF CARE MNGMENT TRAINING: CPT

## 2021-03-04 PROCEDURE — 6370000000 HC RX 637 (ALT 250 FOR IP): Performed by: INTERNAL MEDICINE

## 2021-03-04 PROCEDURE — 70551 MRI BRAIN STEM W/O DYE: CPT

## 2021-03-04 PROCEDURE — 36415 COLL VENOUS BLD VENIPUNCTURE: CPT

## 2021-03-04 PROCEDURE — 82962 GLUCOSE BLOOD TEST: CPT

## 2021-03-04 PROCEDURE — 97162 PT EVAL MOD COMPLEX 30 MIN: CPT | Performed by: PHYSICAL THERAPIST

## 2021-03-04 PROCEDURE — 2060000000 HC ICU INTERMEDIATE R&B

## 2021-03-04 PROCEDURE — 84100 ASSAY OF PHOSPHORUS: CPT

## 2021-03-04 PROCEDURE — 94640 AIRWAY INHALATION TREATMENT: CPT

## 2021-03-04 PROCEDURE — 2580000003 HC RX 258: Performed by: INTERNAL MEDICINE

## 2021-03-04 PROCEDURE — 6370000000 HC RX 637 (ALT 250 FOR IP): Performed by: STUDENT IN AN ORGANIZED HEALTH CARE EDUCATION/TRAINING PROGRAM

## 2021-03-04 PROCEDURE — 97530 THERAPEUTIC ACTIVITIES: CPT | Performed by: PHYSICAL THERAPIST

## 2021-03-04 PROCEDURE — 80048 BASIC METABOLIC PNL TOTAL CA: CPT

## 2021-03-04 PROCEDURE — 97165 OT EVAL LOW COMPLEX 30 MIN: CPT

## 2021-03-04 PROCEDURE — 6370000000 HC RX 637 (ALT 250 FOR IP): Performed by: NURSE PRACTITIONER

## 2021-03-04 PROCEDURE — 83735 ASSAY OF MAGNESIUM: CPT

## 2021-03-04 RX ORDER — POTASSIUM CHLORIDE 20 MEQ/1
20 TABLET, EXTENDED RELEASE ORAL ONCE
Status: COMPLETED | OUTPATIENT
Start: 2021-03-04 | End: 2021-03-04

## 2021-03-04 RX ADMIN — ARFORMOTEROL TARTRATE 15 MCG: 15 SOLUTION RESPIRATORY (INHALATION) at 20:21

## 2021-03-04 RX ADMIN — APIXABAN 5 MG: 5 TABLET, FILM COATED ORAL at 20:57

## 2021-03-04 RX ADMIN — PIPERACILLIN AND TAZOBACTAM 3375 MG: 3; .375 INJECTION, POWDER, LYOPHILIZED, FOR SOLUTION INTRAVENOUS at 01:44

## 2021-03-04 RX ADMIN — ARFORMOTEROL TARTRATE 15 MCG: 15 SOLUTION RESPIRATORY (INHALATION) at 08:21

## 2021-03-04 RX ADMIN — BUDESONIDE 250 MCG: 0.25 SUSPENSION RESPIRATORY (INHALATION) at 08:21

## 2021-03-04 RX ADMIN — BUDESONIDE 250 MCG: 0.25 SUSPENSION RESPIRATORY (INHALATION) at 20:21

## 2021-03-04 RX ADMIN — POTASSIUM CHLORIDE 20 MEQ: 1500 TABLET, EXTENDED RELEASE ORAL at 15:27

## 2021-03-04 RX ADMIN — CEFTRIAXONE SODIUM 1000 MG: 1 INJECTION, POWDER, FOR SOLUTION INTRAMUSCULAR; INTRAVENOUS at 13:39

## 2021-03-04 RX ADMIN — APIXABAN 5 MG: 5 TABLET, FILM COATED ORAL at 09:09

## 2021-03-04 RX ADMIN — CALCIUM 500 MG: 500 TABLET ORAL at 09:09

## 2021-03-04 RX ADMIN — INSULIN LISPRO 1 UNITS: 100 INJECTION, SOLUTION INTRAVENOUS; SUBCUTANEOUS at 21:09

## 2021-03-04 RX ADMIN — FERROUS SULFATE TAB 325 MG (65 MG ELEMENTAL FE) 325 MG: 325 (65 FE) TAB at 09:09

## 2021-03-04 RX ADMIN — CHOLESTYRAMINE 4 G: 4 POWDER, FOR SUSPENSION ORAL at 20:58

## 2021-03-04 RX ADMIN — INSULIN LISPRO 1 UNITS: 100 INJECTION, SOLUTION INTRAVENOUS; SUBCUTANEOUS at 16:54

## 2021-03-04 RX ADMIN — CHOLESTYRAMINE 4 G: 4 POWDER, FOR SUSPENSION ORAL at 09:09

## 2021-03-04 RX ADMIN — CALCIUM 500 MG: 500 TABLET ORAL at 20:57

## 2021-03-04 RX ADMIN — CALCIUM POLYCARBOPHIL 625 MG: 625 TABLET, FILM COATED ORAL at 09:09

## 2021-03-04 ASSESSMENT — PAIN SCALES - GENERAL: PAINLEVEL_OUTOF10: 0

## 2021-03-04 NOTE — CARE COORDINATION
Plan at discharge remains return to [facility]. Will need updated therapy evals and a precert to return. Covid 3/2 (-). Ambulance form on soft chart. Nephrology and General Surgery following.  CM will continue to follow    Juancarlos PLATT, RN  [facility] Case Management  [phone]

## 2021-03-04 NOTE — PROGRESS NOTES
Physical Therapy    Physical Therapy Initial Assessment     Name: Josselin Tobias  : 1950  MRN: [id number]    Referring Provider:  Lam Nixon MD    Date of Service: 3/4/2021    Evaluating PT:  Brook Bunn PT, DPT [id number]      Room #:  0876/2557-K  Diagnosis:  Acute kidney injury  PMHx/PSHx:  Obesity, L bundle branch block, hyperlipidemia, HTN, colostomy, cholecystectomy, hernia repair  Procedure/Surgery:  None this admission  Precautions:  Falls, ostomy x2, tracheostomy  Equipment Needs:  TBD    SUBJECTIVE:  Pt admitted from [facility]) where she had been for approx 2 weeks. Pt has been working towards sit<>stand and pivot transfers with assist x2 in therapy. She has been receiving assist to [facility] to w/c. OBJECTIVE:   Initial Evaluation  Date: 3/4/21 Treatment Short Term/ Long Term   Goals   AM-PAC 6 Clicks      Was pt agreeable to Eval/treatment? yes     Does pt have pain? No c/o pain     Bed Mobility  Rolling: Mod A  Supine to sit: Max A  Sit to supine: Mod A  Scooting: Mod A to EOB, Max A laterally  Rolling: Mod Independent   Supine to sit: Mod Independent   Sit to supine: Mod Independent   Scooting: Mod Independent    Transfers Sit to stand: NT, pt declined  Stand to sit: NT  Stand pivot: NT  Sit to stand: Max A  Stand to sit: Max A  Stand pivot: Max A with AAD   Ambulation    W/C mobility  NT  NT  TBD  >50 feet with manual w/c with Supervision   Stair negotiation: ascended and descended  NT  TBD   ROM BUE:  Limited shoulder flexion to approx 90 degrees  BLE:  WNL, except R ankle PF 0/5     Strength BUE:  4-/5  BLE:  4-/5     Balance Sitting EOB:  SBA  Dynamic Standing:  NT  Sitting EOB:  Independent  Dynamic Standing:   Max A with FWW     Pt is A & O x 3  Sensation:  Pt denies numbness and tingling to extremities  Edema:  unremarkable    Patient education  Pt educated on role of therapy, safety with mobility, sitting balance, improved positioning in bed    Patient response to education:   Pt verbalized understanding Pt demonstrated skill Pt requires further education in this area   yes yes yes     ASSESSMENT:    Comments:  Pt resting semi-supine upon arrival, agreeable to PT eval. Pt requesting RN empty ostomy prior to initiation of mobility. Pt requires manual assist for B LE progression and trunk lift assistance. Pt was able to reach outside base of support and cross midline with single UE support on EOB. Pt declines attempt for standing due to generalized fatigue. Pt returned to semi-supine upon completion of session with all needs in reach. Pt is functioning below baseline abilities at this time and will benefit from continued skilled PT services to improve independence with all bed mobility, transfers, and improved overall activity tolerance. Treatment:  Patient practiced and was instructed in the following treatment:     Bed mobility: cues for sequencing, manual assist for B LE progression and trunk lift   Therapeutic activities: cues for upright posture, sitting EOB x10 minutes, skilled positioning    Pt's/ family goals   1. To get stronger    Patient and or family understand(s) diagnosis, prognosis, and plan of care. yes    PLAN OF CARE:    Current Treatment Recommendations   [x] Strengthening     [] ROM   [x] Balance Training   [x] Endurance Training   [x] Transfer Training   [] Gait Training   [] Stair Training   [x] Positioning   [x] Safety and Education Training   [x] Patient/Caregiver Education   [] HEP  [] Other       PT care will be provided in accordance with the objectives noted above. Whenever appropriate, clear delegation orders will be provided for nursing staff. Exercises and functional mobility practice will be used as well as appropriate assistive devices or modalities to obtain goals. Patient and family education will also be administered as needed. Frequency of treatments: 2-5x/week x 7-10 days.     Time in  1430  Time out  1450    Total Treatment Time 10 minutes     Evaluation Time includes thorough review of current medical information, gathering information on past medical history/social history and prior level of function, completion of standardized testing/informal observation of tasks, assessment of data and education on plan of care and goals.     CPT codes:  [] Low Complexity PT evaluation 98899  [x] Moderate Complexity PT evaluation 79585  [] High Complexity PT evaluation 61635  [] PT Re-evaluation 95179  [] Gait training 57350 0 minutes  [] Manual therapy 81748 0 minutes  [] Therapeutic activities 29521 10 minutes  [] Therapeutic exercises 32843 0 minutes  [] Neuromuscular reeducation 09787 0 minutes     Levon Cotton, PT, DPT  [id number]

## 2021-03-04 NOTE — PROGRESS NOTES
Occupational Therapy  OCCUPATIONAL THERAPY INITIAL EVALUATION        Date:3/4/2021  Patient Name: Bj Garcia  MRN: [id number]  : 1950  Room: [address] [place] [address]    Referring Physician:  Monica Yepez MD    Evaluating OT:  Gio Cage, MOT, OTR/L #[id number]    AM-PAC Daily Activity Raw Score:    Recommended Adaptive Equipment:  TBD as pt progresses     Reason for Admission:  Pt was transferred from SNF w/ lethargy, Hypotension    Diagnosis:     1. Acute kidney injury ([address]    2. Acute hyperkalemia    3. Acute hyponatremia    4. Hypotension, unspecified hypotension type      Procedures this admission:  None     Pertinent Medical History:  HTN, Cholecystectomy, Old Trach - currently healing to close per pt, Morbid Obesity, Recent Small Bowel Resection     Precautions:  Falls  Colostomy L LQ  Bustamante Catheter  L Foot Drop    Pt recently had a Small bowel resection - extensive hospitalization, transferred to an LTACH then transferred to a SNF ~ 2 weeks ago. Home Living:  Currently a Resident of a SNF. Bathroom setup:  NA - bed-level ADLs   Equipment owned:  ?? - [employer], [employer], W/C    Prior Level of Function:  Receives assist for all Bed-level ADLs - Old Colostomy. Nsg staff utilizes a Miryam lift to transfer pt b/t surfaces. Able to complete Low-Pivot Transfers b/t surfaces w/ assist of 2 in therapy.   Assist for W/C mobility  Driving:  No  Occupation:  None    Pain Level:  Denies pain    Additional Complaints:  None    Vitals/Lab Values:  122/53, Room Air     Cognition: A & O x 4   Able to Follow Multi-Step Commands INDly   Memory:  good  (-)   Sequencing:  good (-)   Problem solving:  good (-)   Judgement/safety:  good (-)  Additional Comments:  Pt was pleasant, cooperative       Functional Assessment:   Initial Eval Status  Date: 3-4-21 Treatment Status  Date: Short Term/Long Term Goals  Treatment frequency: PRN 1-3 x/week   1-2 weeks   Feeding IND    Per pt report  NA   Grooming Set up    Able to complete simple tasks after set up w/ HOB in high carmen position    SUP  Seated EOB   UB Dressing Mod A    Mod A to don/doff gown in high-carmen position  Min A  Seated EOB   LB Dressing Dep    Max A of 2 for simulated tasks in supine  Pt ed for safety, adaptive techs/equip  Unable to stand for ax    Max A   Bathing NT      Max A   Toileting Dep    Colostomy and Bustamante Catheter  Nsg care    NA   Bed Mobility  Rolling: Max A  Repositioning:  Max A of 2 toward [facility] in supine   Supine to Sit:  NT    Sit to Supine:  NT     Initiated Transfer to EOB w/ Max A, however, Nsg arrived to room for Nsg care/colostomy care - pt repositioned in High-carmen position      Mod A   Functional Transfers Sit to stand:  NT  Stand to sit:  NT        Max A of 2   Functional Mobility NT        Max A of 2   B/t surfaces only   Balance Sitting:      Static:  Fair(-)    Dynamic:  Fair(-)      Activity Tolerance Fair(-)  Limited by general weakness/fatigue        Visual/  Perceptual WFL  Glasses:  Yes      Hearing WFL  Hearing Aids  No       Hand dominance: Right    UE ROM: RUE:  WFL      LUE:  WFL    Strength: RUE: grossly 3/5 shoulder flex, 4/5 shoulder ext and elbow ext, 4+/5 Elbow flex    LUE: Same as right UE     Strength:  WFL Dio UEs    Fine Motor Coordination:  WFL Dio UEs    Sensation:  Denies numbness or tingling Dio UEs  Tone:  WFL Dio UEs  Edema:  None Noted Dio UEs                            Comments: Upon arrival, patient was found in semi-supine positioned w/ TAPS toward her Right. She was agreeable to participate in therapeutic ax. No Family present during session. Received permission from RN prior to engaging pt in OT services. At the end of the session, patient was properly positioned in Semi-Supine positioned w/ TAPS system toward her Left. Call light and phone within reach, all lines and tubes intact. Oriented pt to call bell.   Made all appropriate Environmental Modifications to facilitate pt's level of IND and safety. All needs met. Bed Alarm activated. Overall patient demonstrated decreased independence and safety during completion of ADL/functional transfer/mobility tasks. Pt would benefit from continued skilled OT to increase safety and independence with completion of ADL/IADL tasks for functional independence and quality of life. Treatment:      Provided Skilled SUP/Assist w/ Pt safety, Proper Positioning, ADLs, Functional Transfers and Functional Mobility as noted above, as well as set up and clean up for session. Skilled monitoring of Vitals and pts response to treatment. Consulted RN    Education:      Provided Pt/Family ed re: Purpose of OT services;  OT Plan of Care;     ADL-  Instruction/training on use of DME/AD/Adaptive equip/techs to improve safety/IND with Functional Ax    Mobility-  Instruction/training on safety and improved independence with bed mobility, functional transfers    Sitting EOB - to improve dynamic sitting balance and activity tolerance during ADLs as noted above   Activity tolerance - Instruction/training on energy conservation/work simplification, techs to increase endurance for completion of Functional Ax    Cognitive retraining -  Cues for safety during Functional Ax for safety, improved safety awareness, sequencing, problem solving   Skilled positioning/alignment for Pain Mgmt, Skin Integrity, Edema Control, to maximize Pt's ability to [facility] interact w/ his/her environment   Skilled monitoring of pt's response to tx ax   Techs for improved Safety/Safety Awareness w/ Functional Activity/Mobility     Recommendations for Continued Participation in OT services during Hospitalization and at D/C - SNF     Made all appropriate Environmental Modifications to facilitate pt's level of IND and safety. Pt and/or Family verbalized/demonstrated a Good(-) understanding of education provided. Will Review PRN.        Assessment of current deficits Functional mobility [x]  ADLs [x] Strength [x]  Cognition []  Functional transfers  [x] IADLs [x] Safety Awareness [x]  Endurance [x]  Fine Motor Coordination [] Balance [x] Vision/perception [] Sensation []   Gross Motor Coordination [] ROM [] Delirium []                  Motor Control []      Plan of Care: OT 1-3 x/week for 1-2 weeks PRN   [x] ADL retraining/AE, Equipment Needs/Recommendations   [x] Energy Conservation Techniques/Strategies      [] Neuromuscular Re-Education      [x] Functional Transfer Training         [x] Functional Mobility Training          [] Cognitive Re-Training          [x] Splinting/Positioning Needs           [x] Therapeutic Activity   [x]Therapeutic Exercise   [] Visual/Perceptual   [] Delirium Prevention/Treatment   [x] Positioning to Improve Functional [place], Safety, and Skin Integrity   [x] Patient and/or Family Education to Increase Safety and Functional [place]   [x] Environmental Modifications  [x] Compensatory techniques for ADLs   [x] Other:       Pt would benefit from continued skilled OT services to increase safety and independence with completion of ADL/IADL tasks for functional independence and quality of life. Pt/Family actively participated in the establishment of goals. Rehab Potential:  Fair(+) for established goals    Patient / Family Goal:  Not stated at this time     Patient and/or Family were instructed on Functional Diagnosis, Prognosis/Goals and OT Plan of Care. Demonstrated Good(-) understanding. Evaluation Time includes thorough review of current medical information, gathering information on past medical history/social history and prior level of function, completion of standardized testing/informal observation of tasks, assessment of data and education on plan of care and goals.      Eval Complexity: Low  Profile and History - Mod  Assessment of Occupational Performance and Identification of Deficits - High  Clinical Decision Making - Low Time In:  1651              Time Out:  1713  Total Treatment Time:  14 minutes      Treatment Charges: Mins Units   OT Eval Low 97165 X 1   OT Eval Medium 50972     OT Eval High 47593     OT Re-Eval [id number]     Therapeutic Ex  70123     Therapeutic Activities 05430     ADL/Self Care 45757 14 1   Neuro Re-ed 99076     Orthotic manage/training  43549     Non-Billable Time     Total Timed Treatment 14 [address] [place], [place], OTR/L  # [id number]

## 2021-03-04 NOTE — PROGRESS NOTES
GENERAL SURGERY  DAILY PROGRESS NOTE  3/4/2021    Chief Complaint   Patient presents with    Altered Mental Status     LETHARGIC/HYPOTENSIVE AT FACILTY       Subjective:  Feels well. Ostomy output 1500    Objective:  /71   Pulse 88   Temp 97.8 °F (36.6 °C) (Temporal)   Resp 18   Ht 5' 2\" (1.575 m)   Wt 188 lb (85.3 kg)   SpO2 97%   BMI 34.39 kg/m²     GENERAL:  Laying in bed, awake, alert, cooperative, no apparent distress  LUNGS:  No increased work of breathing  CARDIOVASCULAR:  RR  ABDOMEN:  Soft, NT, midline wound open/packed, RLQ ostomy with thin stool, LLQ ostomy with thicker stool  EXTREMITIES: No edema or swelling  SKIN: Warm and dry    Assessment/Plan:  79 y.o. female admitted with COLTON, hyponatremia, high ileostomy output    Ok for general diet  COLTON/electrolyte derangements per nephrology  Continue stool thickeners- fiber, cholestyramine. If output remains significantly elevated over 1L/day will add additional agents    Electronically signed by Charlotte Briones DO on 3/4/2021 at 8:44 AM    Seen/examined  See consult  Note reviewed from care everywhere, although operative report from [facility] has been difficult to find. Based on her anatomy from the CT scan, she is susceptible to dehydration from excessive fluid losses from stoma.   Goal of care should be to reduce water loss via binding agents  No other evidence of acute surgical process  Saud Velasquez MD

## 2021-03-04 NOTE — PROGRESS NOTES
Department of Internal Medicine  Nephrology Progress Note      Events reviewed. SUBJECTIVE:  We are following Mrs. Banuelos for COLTON, hyponatremia, and hyperkalemia. She reports no complaints.     Physical Exam:    VITALS:  /71   Pulse 88   Temp 97.8 °F (36.6 °C) (Temporal)   Resp 18   Ht 5' 2\" (1.575 m)   Wt 188 lb (85.3 kg)   SpO2 97%   BMI 34.39 kg/m²   24HR INTAKE/OUTPUT:    Intake/Output Summary (Last 24 hours) at 3/4/2021 1358  Last data filed at 3/4/2021 0800  Gross per 24 hour   Intake 1167.59 ml   Output 3100 ml   Net -1932.41 ml       General Appearance: alert and oriented to person, place and time, well developed and well- nourished, in no acute distress  Skin: warm and dry, no rash or erythema  Head: normocephalic and atraumatic  Eyes: pupils equal, round, and reactive to light, extraocular eye movements intact, conjunctivae normal  ENT: tympanic membrane, external ear and ear canal normal bilaterally, nose without deformity, nasal mucosa and turbinates normal without polyps  Neck: supple and non-tender without mass, no thyromegaly or thyroid nodules, no cervical lymphadenopathy  Pulmonary/Chest: clear to auscultation bilaterally- no wheezes, rales or rhonchi, normal air movement, no respiratory distress  Cardiovascular: normal rate, regular rhythm, normal S1 and S2, no murmurs, rubs, clicks, or gallops,   Abdomen: soft, non-tender, non-distended, normal bowel sounds,  left colostomy bag with watery output, right mucous fistula with greenish mucus  Extremities: no cyanosis, clubbing; minimal pitting edema of bilateral lower extremity     Scheduled Meds:   cefTRIAXone (ROCEPHIN) IV  1,000 mg Intravenous Q24H    insulin lispro  0-6 Units Subcutaneous TID WC    insulin lispro  0-3 Units Subcutaneous Nightly    polycarbophil  625 mg Oral Daily    cholestyramine  1 packet Oral BID    sodium chloride  1,000 mL Intravenous Once    sodium chloride flush  10 mL Intravenous Once    apixaban  5 mg Oral BID    calcium elemental  500 mg Oral BID    ferrous sulfate  325 mg Oral Daily with breakfast    budesonide  0.25 mg Nebulization BID    And    Arformoterol Tartrate  15 mcg Nebulization BID     Continuous Infusions:   dextrose      sodium chloride 75 mL/hr at 03/03/21 0640     PRN Meds:.glucose, dextrose, glucagon (rDNA), dextrose, albuterol, traMADol    DATA:    CBC:   Lab Results   Component Value Date    WBC 6.0 03/04/2021    RBC 3.41 03/04/2021    HGB 9.7 03/04/2021    HCT 31.1 03/04/2021    MCV 91.2 03/04/2021    MCH 28.4 03/04/2021    MCHC 31.2 03/04/2021    RDW 14.7 03/04/2021     03/04/2021    MPV 8.7 03/04/2021     CMP:    Lab Results   Component Value Date     03/04/2021    K 3.4 03/04/2021    K 6.1 03/02/2021    CL 95 03/04/2021    CO2 30 03/04/2021    BUN 30 03/04/2021    CREATININE 1.4 03/04/2021    GFRAA 45 03/04/2021    AGRATIO 0.7 11/27/2020    LABGLOM 37 03/04/2021    GLUCOSE 91 03/04/2021    PROT 8.9 03/02/2021    LABALBU 3.0 03/02/2021    CALCIUM 8.8 03/04/2021    BILITOT 1.1 03/02/2021    ALKPHOS 101 03/02/2021    AST 51 03/02/2021    ALT 17 03/02/2021     Magnesium:    Lab Results   Component Value Date    MG 2.0 03/04/2021     Phosphorus:    Lab Results   Component Value Date    PHOS 3.4 03/04/2021     Radiology Review:      CT of abdomen and pelvis without IV contrast 3/2/2021   1.  Relatively recent postoperative changes with incomplete closure of the   subcutaneous soft tissues overlying the linea alba of the anterior rectus   abdominal muscle across the midline. 2.  Multiple previous bowel surgery with overall shortening of the bowel. 3.  Presence of a right and left-sided patent ostomy, ileostomy on the right,   jejunostomy on the left. 4.  More late subcapsular hematoma of the right lobe of the liver 7.7 x 2 x   4.5 cm. 5.  Status post cholecystectomy, no dilatation biliary tree pancreatic ductal   system. 6.  No obstructive uropathy.    7.  No acute inflammatory changes in the mid mesentery fat planes, free   intraperitoneal air or ascites or indication for bowel obstruction. CXR 3/2/2021   Faint reticular airspace opacities at both lung bases may be on the basis of   pulmonary edema or atypical/viral pneumonia. BRIEF SUMMARY OF INITIAL CONSULT:    Shelli Chinchilla is 79 y.o. female with history of colon cancer (s/p right hemicolectomy, small bowel enterotomy, repair than resection, creation of loop proximal ileostomy and loop distal ileal mucous fistula; complication with abdominal abscess s/p drainage removal), trach decannulated, hypertension (carvedilol and hydralazine), factor V Leiden with chronic DVT of bilateral lower extremity (Eliquis, plan to switch to 2.5 mg twice daily after April 2), type 2 diabetes mellitus (metformin 1000 mg twice daily), history of COPD, who was admitted on 3/2/2021 from skilled nursing facility for complaints of hypotension and altered mental status. Per patient's daughter her blood pressure was 60 / 20 mmHg in the morning, EMS was called who gave her IV fluids and her blood pressure was 90 x 40 mmHg on arrival to the ED. Patient complained that she is continued to have increased output from her ileostomy over the past couple of days, has been drinking water due to feeling thirsty and chewing on ice cubes. In the ED, initial evaluation showed patient to have hyponatremia with sodium of 123 meq/L, hyperkalemia with potassium of 6.1 MEQ/L, low chloride 82 mmol/L, BUN/creatinine of 50/2.8. Nephrology was consulted for electrolyte abnormality and COLTON. Problems resolved:  Hyperkalemia 2/2 COLTON (decreased GFR), resolved. K levels improved. IMPRESSION/RECOMMENDATIONS:      COLTON stage III, volume responsive prerenal COLTON, 2/2 increased output from colostomy with decreased intake. FeNa 0.4% supports prerenal component. Renal function continues to improve with volume repletion.  To continue IV fluids.     Hypovolemic hypoosmolar hyponatremia, 2/2 increased solute rich water loss from the colostomy combined with free water intake, and decreased GFR. Sodium is improving. Hypokalemia, secondary to increased GI losses from colostomy  Hx of hypertension. Currently normotensive. Continue to hold carvedilol and hydralazine for now.  ---------------------------------------------  UTI, on ceftriaxone  Type 2 diabetes mellitus on Metformin. To use insulin while inpatient. Hold Metformin. Hx of colon cancer s/p hemicolectomy, left-sided colostomy and right-sided mucous fistula. Increased watery output from colostomy, started on fiber and cholestyramine by general surgery  Hypercoagulable state with factor V Leiden, on apixaban.   Normocytic anemia, likely secondary to subcapsular hematoma of right lobe of liver per CT scan    Plan:    Replace potassium   Continue NS at 75 cc/hr  Continue 1L fluid restriction  Continue to monitor renal function  Strict I&O  Continue low potassium diet    Electronically signed by ERNESTINE Bruce CNP on 3/4/2021 at 3:49 PM

## 2021-03-04 NOTE — PROGRESS NOTES
Chief Complaint:  Chief Complaint   Patient presents with    Altered Mental Status     LETHARGIC/HYPOTENSIVE AT FACILTY     Acute kidney injury ([address]     Subjective:    She is feeling well today, but continues to complain of dysuria. No f/c. Objective:    BP (!) 122/53   Pulse 82   Temp 97.6 °F (36.4 °C) (Oral)   Resp 16   Ht 5' 2\" (1.575 m)   Wt 188 lb (85.3 kg)   SpO2 99%   BMI 34.39 kg/m²     Current medications that patient is taking have been reviewed.     General appearance: NAD, conversant  HEENT: AT/NC, MMM  Neck: FROM, supple  Lungs: Clear to auscultation, WOB normal  CV: RRR, no MRGs  Abdomen: Soft, non-tender; no masses or HSM, +BS  Extremities: No peripheral edema or digital cyanosis  Skin: no rash, lesions or ulcers  Psych: Calm and cooperative  Neuro: Alert and interactive, face symmetric, moving all extremities, speech fluent    Labs:  CBC with Differential:    Lab Results   Component Value Date    WBC 6.0 03/04/2021    RBC 3.41 03/04/2021    HGB 9.7 03/04/2021    HCT 31.1 03/04/2021     03/04/2021    MCV 91.2 03/04/2021    MCH 28.4 03/04/2021    MCHC 31.2 03/04/2021    RDW 14.7 03/04/2021    NRBC 3 11/27/2020    SEGSPCT 87.1 11/26/2020    BANDSPCT 20.0 11/27/2020    METASPCT 3.0 11/27/2020    LYMPHOPCT 41.7 03/04/2021    MONOPCT 9.7 03/04/2021    BASOPCT 1.0 03/04/2021    MONOSABS 0.58 03/04/2021    LYMPHSABS 2.50 03/04/2021    EOSABS 0.12 03/04/2021    BASOSABS 0.06 03/04/2021     CMP:    Lab Results   Component Value Date     03/04/2021    K 3.4 03/04/2021    K 6.1 03/02/2021    CL 95 03/04/2021    CO2 30 03/04/2021    BUN 30 03/04/2021    CREATININE 1.4 03/04/2021    GFRAA 45 03/04/2021    AGRATIO 0.7 11/27/2020    LABGLOM 37 03/04/2021    GLUCOSE 91 03/04/2021    PROT 8.9 03/02/2021    LABALBU 3.0 03/02/2021    CALCIUM 8.8 03/04/2021    BILITOT 1.1 03/02/2021    ALKPHOS 101 03/02/2021    AST 51 03/02/2021    ALT 17 03/02/2021          Assessment/Plan:  Principal Problem: Acute kidney injury ([facility]  Active Problems:    Essential hypertension    Chronic deep vein thrombosis (DVT) of lower extremity (HCC)    Type 2 diabetes mellitus with complication, without long-term current use of insulin (HCC)    Chronic diastolic (congestive) heart failure (HCC)    Factor V deficiency (HCC)    CKD (chronic kidney disease) stage 3, GFR 30-59 ml/min    Hyponatremia    Hyperkalemia    UTI (urinary tract infection)    Hypotension    Prolonged Q-T interval on ECG    Sepsis ([facility]  Resolved Problems:    * No resolved hospital problems. *       Narrow abx to ceftriaxone (pt denies any known h/o allergy to cefdinir)    BP well controlled now, off antihypertensives. Watch for BB withdrawal.    Euvolemic in terms of CHF    Fluid management per Nephro, but for her comfort I'm going to reduce lab checks to Q12H since hyponatremia has resolved.     Continue apixaban for FVL/DVT    Requires continued inpatient level of care     Brenda Shields    3:08 PM  3/4/2021

## 2021-03-05 LAB
ANION GAP SERPL CALCULATED.3IONS-SCNC: 8 MMOL/L (ref 7–16)
BASOPHILS ABSOLUTE: 0.06 E9/L (ref 0–0.2)
BASOPHILS RELATIVE PERCENT: 0.9 % (ref 0–2)
BUN BLDV-MCNC: 21 MG/DL (ref 8–23)
CALCIUM SERPL-MCNC: 9.6 MG/DL (ref 8.6–10.2)
CHLORIDE BLD-SCNC: 98 MMOL/L (ref 98–107)
CO2: 29 MMOL/L (ref 22–29)
CREAT SERPL-MCNC: 1 MG/DL (ref 0.5–1)
EOSINOPHILS ABSOLUTE: 0.13 E9/L (ref 0.05–0.5)
EOSINOPHILS RELATIVE PERCENT: 2 % (ref 0–6)
GFR AFRICAN AMERICAN: >60
GFR NON-AFRICAN AMERICAN: 55 ML/MIN/1.73
GLUCOSE BLD-MCNC: 85 MG/DL (ref 74–99)
HCT VFR BLD CALC: 31.3 % (ref 34–48)
HEMOGLOBIN: 10 G/DL (ref 11.5–15.5)
IMMATURE GRANULOCYTES #: 0.03 E9/L
IMMATURE GRANULOCYTES %: 0.5 % (ref 0–5)
LYMPHOCYTES ABSOLUTE: 2.73 E9/L (ref 1.5–4)
LYMPHOCYTES RELATIVE PERCENT: 42.3 % (ref 20–42)
MAGNESIUM: 1.6 MG/DL (ref 1.6–2.6)
MCH RBC QN AUTO: 29.2 PG (ref 26–35)
MCHC RBC AUTO-ENTMCNC: 31.9 % (ref 32–34.5)
MCV RBC AUTO: 91.3 FL (ref 80–99.9)
METER GLUCOSE: 138 MG/DL (ref 74–99)
METER GLUCOSE: 94 MG/DL (ref 74–99)
MONOCYTES ABSOLUTE: 0.73 E9/L (ref 0.1–0.95)
MONOCYTES RELATIVE PERCENT: 11.3 % (ref 2–12)
NEUTROPHILS ABSOLUTE: 2.78 E9/L (ref 1.8–7.3)
NEUTROPHILS RELATIVE PERCENT: 43 % (ref 43–80)
ORGANISM: ABNORMAL
ORGANISM: ABNORMAL
PDW BLD-RTO: 14.5 FL (ref 11.5–15)
PHOSPHORUS: 2.6 MG/DL (ref 2.5–4.5)
PLATELET # BLD: 195 E9/L (ref 130–450)
PMV BLD AUTO: 8.9 FL (ref 7–12)
POTASSIUM SERPL-SCNC: 3.7 MMOL/L (ref 3.5–5)
RBC # BLD: 3.43 E12/L (ref 3.5–5.5)
SODIUM BLD-SCNC: 135 MMOL/L (ref 132–146)
URINE CULTURE, ROUTINE: ABNORMAL
URINE CULTURE, ROUTINE: ABNORMAL
WBC # BLD: 6.5 E9/L (ref 4.5–11.5)
WOUND/ABSCESS: ABNORMAL
WOUND/ABSCESS: ABNORMAL

## 2021-03-05 PROCEDURE — 84100 ASSAY OF PHOSPHORUS: CPT

## 2021-03-05 PROCEDURE — 6370000000 HC RX 637 (ALT 250 FOR IP): Performed by: INTERNAL MEDICINE

## 2021-03-05 PROCEDURE — 83735 ASSAY OF MAGNESIUM: CPT

## 2021-03-05 PROCEDURE — 2580000003 HC RX 258: Performed by: INTERNAL MEDICINE

## 2021-03-05 PROCEDURE — 6370000000 HC RX 637 (ALT 250 FOR IP): Performed by: STUDENT IN AN ORGANIZED HEALTH CARE EDUCATION/TRAINING PROGRAM

## 2021-03-05 PROCEDURE — 6360000002 HC RX W HCPCS: Performed by: INTERNAL MEDICINE

## 2021-03-05 PROCEDURE — 94640 AIRWAY INHALATION TREATMENT: CPT

## 2021-03-05 PROCEDURE — 36415 COLL VENOUS BLD VENIPUNCTURE: CPT

## 2021-03-05 PROCEDURE — 80048 BASIC METABOLIC PNL TOTAL CA: CPT

## 2021-03-05 PROCEDURE — 1200000000 HC SEMI PRIVATE

## 2021-03-05 PROCEDURE — 85025 COMPLETE CBC W/AUTO DIFF WBC: CPT

## 2021-03-05 PROCEDURE — 82962 GLUCOSE BLOOD TEST: CPT

## 2021-03-05 RX ORDER — CALCIUM POLYCARBOPHIL 625 MG 625 MG/1
625 TABLET ORAL DAILY
Refills: 5 | COMMUNITY
Start: 2021-03-05 | End: 2021-03-11 | Stop reason: HOSPADM

## 2021-03-05 RX ORDER — CHOLESTYRAMINE 4 G/9G
1 POWDER, FOR SUSPENSION ORAL 2 TIMES DAILY
Qty: 90 PACKET | Refills: 5 | Status: SHIPPED
Start: 2021-03-05 | End: 2021-03-11

## 2021-03-05 RX ORDER — SODIUM CHLORIDE 9 MG/ML
INJECTION, SOLUTION INTRAVENOUS CONTINUOUS
Status: DISCONTINUED | OUTPATIENT
Start: 2021-03-05 | End: 2021-03-05

## 2021-03-05 RX ORDER — CEFDINIR 300 MG/1
300 CAPSULE ORAL 2 TIMES DAILY
Qty: 10 CAPSULE | Refills: 0 | DISCHARGE
Start: 2021-03-05 | End: 2021-03-11 | Stop reason: HOSPADM

## 2021-03-05 RX ADMIN — APIXABAN 5 MG: 5 TABLET, FILM COATED ORAL at 21:51

## 2021-03-05 RX ADMIN — CEFTRIAXONE SODIUM 1000 MG: 1 INJECTION, POWDER, FOR SOLUTION INTRAMUSCULAR; INTRAVENOUS at 12:34

## 2021-03-05 RX ADMIN — APIXABAN 5 MG: 5 TABLET, FILM COATED ORAL at 10:54

## 2021-03-05 RX ADMIN — CALCIUM POLYCARBOPHIL 625 MG: 625 TABLET, FILM COATED ORAL at 10:57

## 2021-03-05 RX ADMIN — FERROUS SULFATE TAB 325 MG (65 MG ELEMENTAL FE) 325 MG: 325 (65 FE) TAB at 10:57

## 2021-03-05 RX ADMIN — ARFORMOTEROL TARTRATE 15 MCG: 15 SOLUTION RESPIRATORY (INHALATION) at 20:22

## 2021-03-05 RX ADMIN — BUDESONIDE 250 MCG: 0.25 SUSPENSION RESPIRATORY (INHALATION) at 09:45

## 2021-03-05 RX ADMIN — TRAMADOL HYDROCHLORIDE 50 MG: 50 TABLET, FILM COATED ORAL at 22:05

## 2021-03-05 RX ADMIN — CHOLESTYRAMINE 4 G: 4 POWDER, FOR SUSPENSION ORAL at 10:58

## 2021-03-05 RX ADMIN — SODIUM CHLORIDE: 9 INJECTION, SOLUTION INTRAVENOUS at 12:17

## 2021-03-05 RX ADMIN — CALCIUM 500 MG: 500 TABLET ORAL at 10:54

## 2021-03-05 RX ADMIN — TRAMADOL HYDROCHLORIDE 50 MG: 50 TABLET, FILM COATED ORAL at 10:55

## 2021-03-05 RX ADMIN — BUDESONIDE 250 MCG: 0.25 SUSPENSION RESPIRATORY (INHALATION) at 20:22

## 2021-03-05 RX ADMIN — ARFORMOTEROL TARTRATE 15 MCG: 15 SOLUTION RESPIRATORY (INHALATION) at 09:45

## 2021-03-05 ASSESSMENT — PAIN SCALES - GENERAL
PAINLEVEL_OUTOF10: 6
PAINLEVEL_OUTOF10: 0
PAINLEVEL_OUTOF10: 4
PAINLEVEL_OUTOF10: 0

## 2021-03-05 NOTE — DISCHARGE SUMMARY
Physician Discharge Summary     Patient ID:  Usha Velasco  [id number]  79 y.o.  1950    Admit date: 3/2/2021    Discharge date and time:  3/11/2021     Admission Diagnoses:   Chief Complaint   Patient presents with    Altered Mental Status     LETHARGIC/HYPOTENSIVE AT FACILTY      Acute kidney injury [facility])     Discharge Diagnoses:   Principal Problem:    Acute kidney injury ([facility]  Active Problems:    Essential hypertension    Chronic deep vein thrombosis (DVT) of lower extremity ([facility]    Type 2 diabetes mellitus with complication, without long-term current use of insulin (HCC)    Chronic diastolic (congestive) heart failure ([facility])    Factor V deficiency ([facility])    CKD (chronic kidney disease) stage 3, GFR 30-59 ml/min    Hyponatremia    Hyperkalemia    UTI (urinary tract infection)    Hypotension    Prolonged Q-T interval on ECG    Sepsis ([facility]    Severe protein-calorie malnutrition ([facility]  Resolved Problems:    * No resolved hospital problems. *       Consults: Surgery, nephrology    Procedures: None    Hospital Course:   Patient recently has had usually complicated hospital stays after surgery for colon cancer. She ultimately was stabilized and transferred to rehab. She was sent back to the hospital due to altered mental status and hypotension with COLTON. She was on a lot of blood pressure meds as well as diuretic. These were all withheld. She also complained of urinary tract infection symptoms. Her UA was positive and her culture subsequently grew Klebsiella. She was initially treated with broad-spectrum antibiotics but subsequently narrowed to ceftriaxone, and transitioned to PO antibiotics. She completed treatment in the hospital.  Her COLTON and hypotension resolved with IV fluids and withholding antihypertensives.   She was on a high dose of carvedilol and has not had any signs or symptoms of beta-blocker withdrawal.  She was somewhat hyponatremic on admission as well, which is also resolved with fluids. Her discharge got held up because of concerns over rising ostomy outputs. For 4-5 days she actually kept her own electrolyte and renal function status stable with PO intake alone, against about 1.2-1.4 L/day of ostomy output. However, it seemed for a while that her outputs were rising up to around 2 L/day and there were some dips in renal function and sodium. We ended up increasing her fiber, adding Lomotil, and increasing Questran. Her outputs are back down into the ~1.5 L/day range and I have encouraged her to push PO fluids (2 L/day at least). She needs repeat BMP weekly and her ostomy outputs should be tracked daily to ensure they are not increasing beyond her PO intake abilities.     Discharge Exam:  Vitals:    03/04/21 2022 03/04/21 2023 03/05/21 0445 03/05/21 0835   BP:   126/62 (!) 124/59   Pulse:   86 97   Resp:   20 19   Temp:   97 °F (36.1 °C)    TempSrc:   Temporal    SpO2: 92% 92% 92%    Weight:       Height:            General: Super pleasant nontoxic female in no acute distress  Cardiac: Regular rate and rhythm without murmurs  Lungs: Clear bilaterally anteriorly  Abdomen: Present bowel sounds soft nontender nondistended without rebound or guarding, with left-sided ostomy and right-sided mucous fistula  Extremities: No lower extremity edema     Condition:  Stable    Disposition: [facility]    Patient Instructions:   Current Discharge Medication List      START taking these medications    Details   psyllium (KONSYL) 28.3 % PACK Take 2 packets by mouth 3 times daily  Qty: 30 each, Refills: 0      cholestyramine (QUESTRAN) 4 g packet Take 1 packet by mouth 2 times daily  Qty: 90 packet, Refills: 5      loperamide (IMODIUM) 2 MG capsule Take 1 capsule by mouth 3 times daily  Qty: 90 capsule, Refills: 0         CONTINUE these medications which have CHANGED    Details   metFORMIN (GLUCOPHAGE) 500 MG tablet Take 1 tablet by mouth 2 times daily  Qty: 360 tablet, Refills: 1    Associated Diagnoses: Type 2 diabetes mellitus with complication, without long-term current use of insulin ([facility]         CONTINUE these medications which have NOT CHANGED    Details   ferrous sulfate (IRON 325) 325 (65 Fe) MG tablet ferrous sulfate Ferrous Sulfate Active 325 MG Oral Daily After A Meal 100 October 23rd, 2020 10:46am 10-  [facility] ([id number])      apixaban (ELIQUIS) 5 MG TABS tablet Take 1 tablet by mouth 2 times daily  Qty: 84 tablet, Refills: 0    Comments: Samples of this drug were given to the patient, quantity 84, Lot Number [id number], Exp: 04/2022  Associated Diagnoses: Chronic deep vein thrombosis (DVT) of other vein of lower extremity, unspecified laterality (HCC)      lovastatin (MEVACOR) 40 MG tablet Take 1 tablet by mouth nightly  Qty: 90 tablet, Refills: 1    Associated Diagnoses: Mixed hyperlipidemia      PROAIR  (90 Base) MCG/ACT inhaler Inhale 1 puff into the lungs 4 times daily  Qty: 3 Inhaler, Refills: 1    Associated Diagnoses: Moderate persistent asthma with acute exacerbation      Cholecalciferol (VITAMIN D3) 25 MCG (1000 UT) CAPS Take by mouth daily      alendronate (FOSAMAX) 70 MG tablet Take 1 tablet by mouth once a week  Qty: 12 tablet, Refills: 0    Associated Diagnoses: Age-related osteoporosis without current pathological fracture      budesonide-formoterol (SYMBICORT) 80-4.5 MCG/ACT AERO Inhale 2 puffs into the lungs 2 times daily  Qty: 3 Inhaler, Refills: 1    Associated Diagnoses:  Moderate persistent asthma with acute exacerbation      Calcium Carbonate (CALTRATE 600 PO) Take by mouth 2 times daily          STOP taking these medications       traMADol (ULTRAM) 50 MG tablet Comments:   Reason for Stopping:         carvedilol (COREG) 25 MG tablet Comments:   Reason for Stopping:         furosemide (LASIX) 20 MG tablet Comments:   Reason for Stopping:         potassium chloride (KLOR-CON) 10 MEQ extended release tablet Comments:   Reason for Stopping: hydrALAZINE (APRESOLINE) 50 MG tablet Comments:   Reason for Stopping:              Activity: activity as tolerated  Diet: regular diet    Follow-up with PCP in 1 week.     Note that over 30 minutes was spent in preparing discharge papers, discussing discharge with patient, medication review, etc.    Signed:  Mino Clifford    3/11/2021  11:37 AM

## 2021-03-05 NOTE — PLAN OF CARE
Problem: Falls - Risk of:  Goal: Will remain free from falls  Description: Will remain free from falls  Outcome: Met This Shift  Goal: Absence of physical injury  Description: Absence of physical injury  Outcome: Met This Shift     Problem: Skin Integrity:  Goal: Will show no infection signs and symptoms  Description: Will show no infection signs and symptoms  3/5/2021 1801 by Sebastian Perdue RN  Outcome: Met This Shift  Goal: Absence of new skin breakdown  Description: Absence of new skin breakdown  3/5/2021 1801 by Sebastian Perdue RN  Outcome: Met This Shift

## 2021-03-05 NOTE — PLAN OF CARE
Problem: Skin Integrity:  Goal: Will show no infection signs and symptoms  Description: Will show no infection signs and symptoms  3/5/2021 0554 by Sarah Mendoza RN  Outcome: Ongoing     Problem: Skin Integrity:  Goal: Absence of new skin breakdown  Description: Absence of new skin breakdown  3/5/2021 0554 by Sarah Mendoza RN  Outcome: Ongoing

## 2021-03-05 NOTE — PROGRESS NOTES
GENERAL SURGERY  DAILY PROGRESS NOTE  3/5/2021    Chief Complaint   Patient presents with    Altered Mental Status     LETHARGIC/HYPOTENSIVE AT FACILTY       Subjective:  Overall doing well. Having irritation by left mucus fistula due to bag leak.  Ostomy output 1200    Objective:  /62   Pulse 86   Temp 97 °F (36.1 °C) (Temporal)   Resp 20   Ht 5' 2\" (1.575 m)   Wt 188 lb (85.3 kg)   SpO2 92%   BMI 34.39 kg/m²     GENERAL:  Laying in bed, awake, alert, cooperative, no apparent distress  LUNGS:  No increased work of breathing  CARDIOVASCULAR:  RR  ABDOMEN:  Soft, NT, midline wound open/packed, RLQ ostomy with thin stool, LLQ ostomy with thicker stool  EXTREMITIES: No edema or swelling  SKIN: Warm and dry    Assessment/Plan:  79 y.o. female admitted with COLTON, hyponatremia, high ileostomy output    Ok for general diet  COLTON/electrolyte derangements per nephrology  Continue stool thickeners on discharge- fiber, cholestyramine  No surgical interventions planned  [id number] Shyla Escobar for discharge from general surgery POV  Please feel free to call with questions or concerns    Electronically signed by Kristie Chavez DO on 3/5/2021 at 7:27 AM     Seen/examined  Agree with above  [email or web address]

## 2021-03-05 NOTE — DISCHARGE INSTR - COC
Continuity of Care Form    Patient Name: Ines Ying   :  1950  MRN:  [id number]    Admit date:  3/2/2021  Discharge date:  2021    Code Status Order: No Order   Advance Directives:   [facility] Documentation       Date/Time Healthcare Directive Type of Healthcare Directive Copy in [facility] [address] Agent's Name Healthcare Agent's Phone Number    [phone]  No, patient does not have an advance directive for healthcare treatment -- -- -- -- --            Admitting Physician:  Lisa Callahan MD  PCP: Floyd Phan MD    Discharging Nurse: nate segal  [address] Unit/Room#: 7492/2248-E  Discharging Unit Phone Number: [phone]    Emergency Contact:   Extended Emergency Contact Information  Primary Emergency Contact: Jose Banuelos   [address] Phone: [phone]  Relation: Child  Secondary Emergency Contact: Moe Goldsmith[address] Phone: [phone]  Relation: Child    Past Surgical History:  Past Surgical History:   Procedure Laterality Date    APPENDECTOMY      CHOLECYSTECTOMY      COLOSTOMY      due to divertiulosis since than it was reversed    HERNIA REPAIR      HYSTERECTOMY, TOTAL ABDOMINAL         Immunization History:   Immunization History   Administered Date(s) Administered    Influenza Virus Vaccine 2014    Influenza, High Dose (Fluzone 65 yrs and older) 11/15/2016, 2017    Pneumococcal Conjugate 13-valent ([id number]) 2017    Pneumococcal Polysaccharide ([id number]) 2015    Tdap (Boostrix, Adacel) 2015       Active Problems:  Patient Active Problem List   Diagnosis Code    Mixed hyperlipidemia E78.2    Essential hypertension I10    LBBB (left bundle branch block) I44.7    Chronic deep vein thrombosis (DVT) of lower extremity (HCC) I82.509    Personal history of pulmonary embolism Z86.711    Type 2 diabetes mellitus with complication, without long-term current use of insulin ([facility]) E11.8    Chronic diastolic (congestive) heart failure ([facility]) I50.32    Vitamin D deficiency E55.9    Venous insufficiency I87.2    Age-related osteoporosis without current pathological fracture M81.0    Other specified hypothyroidism E03.8    Primary generalized (osteo)arthritis M15.0    Chronic pain syndrome G89.4    Morbid obesity with BMI of 45.0-49.9, adult ([facility]) E66.01, Z68.42    Seasonal allergic rhinitis due to pollen J30.1    Moderate persistent asthma with acute exacerbation J45.41    Class 3 severe obesity due to excess calories with serious comorbidity and body mass index (BMI) of 45.0 to 49.9 in adult ([facility] E66.01, Z68.42    Encounter for immunization Z23    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    Iron deficiency anemia due to chronic blood loss D50.0    Acute kidney injury ([facility] N17.9    CKD (chronic kidney disease) stage 3, GFR 30-59 ml/min N18.30    Hyponatremia E87.1    Hyperkalemia E87.5    UTI (urinary tract infection) N39.0    Hypotension I95.9    Prolonged Q-T interval on ECG R94.31    Sepsis ([facility] A41.9       Isolation/Infection:   Isolation            Droplet Plus          Patient Infection Status       Infection Onset Added Last Indicated Last Indicated By Review Planned Expiration Resolved Resolved By    COVID-19 Rule Out 03/09/21 03/10/21 03/10/21 Arjun Bower RN 03/17/21 03/24/21      Exposure 3/9/21. Isolation required for 14 days.     Resolved    COVID-19 Rule Out 03/10/21 03/10/21 03/10/21 COVID-19, Rapid (Ordered)   03/10/21 Rule-Out Test Resulted    COVID-19 Rule Out 03/02/21 03/02/21 03/02/21 COVID-19, Rapid (Ordered)   03/02/21 Rule-Out Test Resulted            Nurse Assessment:  Last Vital Signs: /76   Pulse 88   Temp 97.4 °F (36.3 °C) (Temporal)   Resp 17   Ht 5' 2\" (1.575 m)   Wt 188 lb (85.3 kg)   SpO2 98%   BMI 34.39 kg/m²     Last documented pain score (0-10 scale): Pain Level: 0  Last Weight:   Wt Readings from Last 1 Encounters:   03/03/21 188 Amount None 03/11/21 0815   Kimmie-wound Assessment Intact 03/11/21 0815   Wound Thickness Description not for Pressure Injury Full thickness 03/11/21 0815   Number of days: 8        Elimination:  Continence: Bowel: Yes  Bladder: at times  Urinary Catheter: Removal Date 03/05/2021    Colostomy/Ileostomy/Ileal Conduit: Yes; use convex pouch sent with patient with barrier ring and barrier strips; use crusting technique with antifungal powder and no sting sting prep only to red raw area from 9-12,   Mucous Fistula RLQ-Stomal Appliance: 1 piece  Ileostomy Loop ileostomy LLQ-Stomal Appliance: 1 piece, Changed(with convexity and ring)  [REMOVED] Colostomy LLQ-Stoma  Assessment: Bleeding  Mucous Fistula RLQ-Stoma  Assessment: Protrudes, Moist  Ileostomy Loop ileostomy LLQ-Stoma  Assessment: Red, Moist  [REMOVED] Colostomy LLQ-Peristomal Assessment: Red  Mucous Fistula RLQ-Peristomal Assessment: Intact  Ileostomy Loop ileostomy LLQ-Peristomal Assessment: Intact  [REMOVED] Colostomy LLQ-Treatment: Bag change  Mucous Fistula RLQ-Treatment: Bag change(skin care)  Ileostomy Loop ileostomy LLQ-Treatment: Bag change(skin care)  [REMOVED] Colostomy LLQ-Stool Appearance: Watery  Mucous Fistula RLQ-Stool Appearance: Loose  Ileostomy Loop ileostomy LLQ-Stool Appearance: Watery  [REMOVED] Colostomy LLQ-Stool Color: Minnette Escort  Mucous Fistula RLQ-Stool Color: Brown  Ileostomy Loop ileostomy LLQ-Stool Color: Brown  [REMOVED] Colostomy LLQ-Stool Amount: Large  Ileostomy Loop ileostomy LLQ-Stool Amount: Small  [REMOVED] Colostomy LLQ-Output (mL): 150 ml  Mucous Fistula RLQ-Output (mL): 100 ml  Ileostomy Loop ileostomy LLQ-Output (mL): 100 ml    Date of Last BM: 03/05/2021    Intake/Output Summary (Last 24 hours) at 3/11/2021 1137  Last data filed at 3/11/2021 0043  Gross per 24 hour   Intake --   Output 750 ml   Net -750 ml     I/O last 3 completed shifts:  In: -   Out: 1400 [Stool:1400]    Safety Concerns:      At Risk for Electronically signed by Mino Clifford MD on 3/11/21 at 3:41 PM EST

## 2021-03-05 NOTE — FLOWSHEET NOTE
Inpatient Wound Care(initial evaluation) 4508a     Admit Date: 3/2/2021 12:30 PM    Reason for consult:  Abdomen, coccyx, ileostomy, mucous fistula    Significant history:      Chief Complaint   Patient presents with    Altered Mental Status       LETHARGIC/HYPOTENSIVE AT Facility       presents with: Acute kidney injury [facility])     Patient underwent right-sided colectomy in November 2020 for colon cancer at [address] the patient was peritoneal and was re explored with findings of missed enterotomy which was repaired. After this second surgery experienced a cardiopulmonary arrest, ROSC was achieved and she was intubated.  Patient was transferred from [facility] to the [facility] facility. On 11/27 she underwent exploratory laparotomy with small bowel resection plus loop ileostomy plus distal mucous fistula and excision of mesh. Afterwards patient developed an abdominal abscess that required IR drainage on 12/3. Patient underwent a tracheostomy on 12/8. Patient has since been decannulated.       The patient states that starting yesterday she felt very groggy and disoriented. She was residing at a nursing facility. Reportedly her blood pressure was 60/20. She was sent to our hospital where she was found to be hypotensive, but fluid responsive. She has been resuscitated.       Wound history:  Admitted with wounds    Findings:     03/05/21 1040   Ileostomy Loop ileostomy LLQ   Placement Date: 11/27/20   Pre-existing: No  Ileostomy Type: Loop ileostomy  Location: LLQ   Stomal Appliance 1 piece; Changed  (with convexity with barrier ring)   Stoma  Assessment Flush;Moist;Red   Peristomal Assessment   (red, raw)   Treatment Bag change;Stoma powder  (barrier sheet)   Stool Color Brown   Stool Appearance Watery   Stool Amount Large   Skin Integrity   Skin Integrity Excoriation; Redness   Location abdominal fold   Skin Integrity Site 2   Skin Integrity Location 2 Bruising   Location 2 arms, abdomen Skin Integrity Site 3   Skin Integrity Location 3   (vascular discoloration)    Location 3 BLE   Skin Integrity Site 4   Skin Integrity Location 4   (dry flaky)   Location 4 feet   Wound 03/03/21 Coccyx   Date First Assessed/Time First Assessed: 03/03/21 0900   Present on Hospital Admission: Yes  Primary Wound Type: Pressure Injury  Location: Coccyx   Wound Image    Wound Etiology Pressure Stage  2   Dressing/Treatment Alginate  (STRATASORB)   Wound Length (cm) 2 cm   Wound Width (cm) 0.8 cm   Wound Depth (cm) 1 cm   Wound Surface Area (cm^2) 1.6 cm^2   Change in Wound Size % (l*w) 20   Wound Volume (cm^3) 1.6 cm^3   Wound Healing % 20   Wound Assessment Pink/red   Drainage Amount Scant   Drainage Description Serosanguinous   Odor None   Kimmie-wound Assessment Intact   Wound 03/03/21 Abdomen Mid   Date First Assessed/Time First Assessed: 03/03/21 0900   Present on Hospital Admission: Yes  Primary Wound Type: Surgical Type  Location: Abdomen  Wound Location Orientation: Mid   Wound Image    Wound Etiology Non-Healing Surgical   Dressing Status New dressing applied   Wound Cleansed Cleansed with saline   Dressing/Treatment ABD;Dry dressing;Moist to dry   Wound Length (cm) 14 cm   Wound Width (cm) 8.2 cm   Wound Depth (cm) 3 cm   Wound Surface Area (cm^2) 114.8 cm^2   Change in Wound Size % (l*w) -2.5   Wound Volume (cm^3) 344.4 cm^3   Wound Healing % -515   Undermining Starts ___ O'Clock   (5-7=4 cm; & 12-1=6 cms)   Wound Assessment Pink/red   Drainage Amount None   Kimmie-wound Assessment Intact   Wound Thickness Description not for Pressure Injury Full thickness   old trach site  Mucous fistula pouch changed    **Informed Consent**    The patient has given verbal consent to have photos taken of wounds and inserted into their chart as part of their permanent medical record for purposes of documentation, treatment management and/or medical review.    All Images taken on 3/5/21 of patient name: Anuj Lundberg were transmitted and stored on secured [employer] located within [employer] by a registered [employer] Mobile Application Device.      Impression:  Coccyx stage 2  Full thickness wound abdomen    Plan:  Moist Kerlix to abdomen  opticell to coccyx  ileostomy and mucous fistula management  Will need continued preventative care    Autumn Or 3/5/2021 1:01 PM

## 2021-03-05 NOTE — PROGRESS NOTES
Chief Complaint:  Chief Complaint   Patient presents with    Altered Mental Status     LETHARGIC/HYPOTENSIVE AT FACILTY     Acute kidney injury ([address]     Subjective:    She is feeling well today, and her dysuria is improving. Objective:    /61   Pulse 95   Temp 98.1 °F (36.7 °C) (Oral)   Resp 17   Ht 5' 2\" (1.575 m)   Wt 188 lb (85.3 kg)   SpO2 96%   BMI 34.39 kg/m²     Current medications that patient is taking have been reviewed. General appearance: NAD, conversant  HEENT: AT/NC, MMM  Neck: FROM, supple  Lungs: Clear to auscultation, WOB normal  CV: RRR, no MRGs  Abdomen: Soft, non-tender; no masses or HSM, +BS. Ostomy output is brown and normal in appearance.   Extremities: No peripheral edema or digital cyanosis  Skin: no rash, lesions or ulcers  Psych: Calm and cooperative  Neuro: Alert and interactive, face symmetric, moving all extremities, speech fluent    Labs:  CBC with Differential:    Lab Results   Component Value Date    WBC 6.5 03/05/2021    RBC 3.43 03/05/2021    HGB 10.0 03/05/2021    HCT 31.3 03/05/2021     03/05/2021    MCV 91.3 03/05/2021    MCH 29.2 03/05/2021    MCHC 31.9 03/05/2021    RDW 14.5 03/05/2021    NRBC 3 11/27/2020    SEGSPCT 87.1 11/26/2020    BANDSPCT 20.0 11/27/2020    METASPCT 3.0 11/27/2020    LYMPHOPCT 42.3 03/05/2021    MONOPCT 11.3 03/05/2021    BASOPCT 0.9 03/05/2021    MONOSABS 0.73 03/05/2021    LYMPHSABS 2.73 03/05/2021    EOSABS 0.13 03/05/2021    BASOSABS 0.06 03/05/2021     CMP:    Lab Results   Component Value Date     03/05/2021    K 3.7 03/05/2021    K 6.1 03/02/2021    CL 98 03/05/2021    CO2 29 03/05/2021    BUN 21 03/05/2021    CREATININE 1.0 03/05/2021    GFRAA >60 03/05/2021    AGRATIO 0.7 11/27/2020    LABGLOM 55 03/05/2021    GLUCOSE 85 03/05/2021    PROT 8.9 03/02/2021    LABALBU 3.0 03/02/2021    CALCIUM 9.6 03/05/2021    BILITOT 1.1 03/02/2021    ALKPHOS 101 03/02/2021    AST 51 03/02/2021    ALT 17 03/02/2021 Assessment/Plan:  Principal Problem:    Acute kidney injury ([facility]  Active Problems:    Essential hypertension    Chronic deep vein thrombosis (DVT) of lower extremity (HCC)    Type 2 diabetes mellitus with complication, without long-term current use of insulin (HCC)    Chronic diastolic (congestive) heart failure (HCC)    Factor V deficiency (HCC)    CKD (chronic kidney disease) stage 3, GFR 30-59 ml/min    Hyponatremia    Hyperkalemia    UTI (urinary tract infection)    Hypotension    Prolonged Q-T interval on ECG    Sepsis ([facility]  Resolved Problems:    * No resolved hospital problems. *       Continue ceftriaxone. BP well controlled now, off antihypertensives. Euvolemic in terms of CHF    Hyponatremia resolved. D/c IV fluids. Eating and drinking quite well.     Continue apixaban for FVL/DVT    Medically ready for d/c, awaiting pre-cert to return to [facility]    Eloise Bermudez    5:48 PM  3/5/2021

## 2021-03-05 NOTE — PROGRESS NOTES
Department of Internal Medicine  Nephrology Progress Note      Events reviewed. SUBJECTIVE:  We are following Mrs. Banuelos for COLTON, hyponatremia, and hyperkalemia. She reports no complaints.     Physical Exam:    VITALS:  /61   Pulse 95   Temp 98.1 °F (36.7 °C) (Oral)   Resp 17   Ht 5' 2\" (1.575 m)   Wt 188 lb (85.3 kg)   SpO2 96%   BMI 34.39 kg/m²   24HR INTAKE/OUTPUT:      Intake/Output Summary (Last 24 hours) at 3/5/2021 1532  Last data filed at 3/5/2021 1432  Gross per 24 hour   Intake 520 ml   Output 1905 ml   Net -1385 ml       General Appearance: alert and oriented to person, place and time, well developed and well- nourished, in no acute distress  Skin: warm and dry, no rash or erythema  Head: normocephalic and atraumatic  Eyes: pupils equal, round, and reactive to light, extraocular eye movements intact, conjunctivae normal  ENT: tympanic membrane, external ear and ear canal normal bilaterally, nose without deformity, nasal mucosa and turbinates normal without polyps  Neck: supple and non-tender without mass, no thyromegaly or thyroid nodules, no cervical lymphadenopathy  Pulmonary/Chest: clear to auscultation bilaterally- no wheezes, rales or rhonchi, normal air movement, no respiratory distress  Cardiovascular: normal rate, regular rhythm, normal S1 and S2, no murmurs, rubs, clicks, or gallops,   Abdomen: soft, non-tender, non-distended, normal bowel sounds,  left colostomy bag with watery output, right mucous fistula with greenish mucus  Extremities: no cyanosis, clubbing; minimal pitting edema of bilateral lower extremity     Scheduled Meds:   miconazole   Topical BID    cefTRIAXone (ROCEPHIN) IV  1,000 mg Intravenous Q24H    insulin lispro  0-6 Units Subcutaneous TID WC    insulin lispro  0-3 Units Subcutaneous Nightly    polycarbophil  625 mg Oral Daily    cholestyramine  1 packet Oral BID    sodium chloride  1,000 mL Intravenous Once    sodium chloride flush  10 mL Intravenous Once    apixaban  5 mg Oral BID    calcium elemental  500 mg Oral BID    ferrous sulfate  325 mg Oral Daily with breakfast    budesonide  0.25 mg Nebulization BID    And    Arformoterol Tartrate  15 mcg Nebulization BID     Continuous Infusions:   sodium chloride 50 mL/hr at 03/05/21 1217    dextrose       PRN Meds:.glucose, dextrose, glucagon (rDNA), dextrose, albuterol, traMADol    DATA:    CBC:   Lab Results   Component Value Date    WBC 6.5 03/05/2021    RBC 3.43 03/05/2021    HGB 10.0 03/05/2021    HCT 31.3 03/05/2021    MCV 91.3 03/05/2021    MCH 29.2 03/05/2021    MCHC 31.9 03/05/2021    RDW 14.5 03/05/2021     03/05/2021    MPV 8.9 03/05/2021     CMP:    Lab Results   Component Value Date     03/05/2021    K 3.7 03/05/2021    K 6.1 03/02/2021    CL 98 03/05/2021    CO2 29 03/05/2021    BUN 21 03/05/2021    CREATININE 1.0 03/05/2021    GFRAA >60 03/05/2021    AGRATIO 0.7 11/27/2020    LABGLOM 55 03/05/2021    GLUCOSE 85 03/05/2021    PROT 8.9 03/02/2021    LABALBU 3.0 03/02/2021    CALCIUM 9.6 03/05/2021    BILITOT 1.1 03/02/2021    ALKPHOS 101 03/02/2021    AST 51 03/02/2021    ALT 17 03/02/2021     Magnesium:    Lab Results   Component Value Date    MG 1.6 03/05/2021     Phosphorus:    Lab Results   Component Value Date    PHOS 2.6 03/05/2021     Radiology Review:      CT of abdomen and pelvis without IV contrast 3/2/2021   1.  Relatively recent postoperative changes with incomplete closure of the   subcutaneous soft tissues overlying the linea alba of the anterior rectus   abdominal muscle across the midline. 2.  Multiple previous bowel surgery with overall shortening of the bowel. 3.  Presence of a right and left-sided patent ostomy, ileostomy on the right,   jejunostomy on the left. 4.  More late subcapsular hematoma of the right lobe of the liver 7.7 x 2 x   4.5 cm. 5.  Status post cholecystectomy, no dilatation biliary tree pancreatic ductal   system.    6.  No obstructive uropathy. 7.  No acute inflammatory changes in the mid mesentery fat planes, free   intraperitoneal air or ascites or indication for bowel obstruction. CXR 3/2/2021   Faint reticular airspace opacities at both lung bases may be on the basis of   pulmonary edema or atypical/viral pneumonia. BRIEF SUMMARY OF INITIAL CONSULT:    Briefly Rosi Martin is 79 y.o. female with history of colon cancer (s/p right hemicolectomy, small bowel enterotomy, repair than resection, creation of loop proximal ileostomy and loop distal ileal mucous fistula; complication with abdominal abscess s/p drainage removal), trach decannulated, hypertension (carvedilol and hydralazine), factor V Leiden with chronic DVT of bilateral lower extremity (Eliquis, plan to switch to 2.5 mg twice daily after April 2), type 2 diabetes mellitus (metformin 1000 mg twice daily), history of COPD, who was admitted on 3/2/2021 from skilled nursing facility for complaints of hypotension and altered mental status. Per patient's daughter her blood pressure was 60 / 20 mmHg in the morning, EMS was called who gave her IV fluids and her blood pressure was 90 x 40 mmHg on arrival to the ED. Patient complained that she is continued to have increased output from her ileostomy over the past couple of days, has been drinking water due to feeling thirsty and chewing on ice cubes. In the ED, initial evaluation showed patient to have hyponatremia with sodium of 123 meq/L, hyperkalemia with potassium of 6.1 MEQ/L, low chloride 82 mmol/L, BUN/creatinine of 50/2.8. Nephrology was consulted for electrolyte abnormality and COLTON. Problems resolved:  Hyperkalemia 2/2 COLTON (decreased GFR), resolved. K levels improved. IMPRESSION/RECOMMENDATIONS:      COLTON stage III, volume responsive prerenal COLTON, 2/2 increased output from colostomy with decreased intake. FeNa 0.4% supports prerenal component.    Resolved with IV fluids administration.     Hypovolemic hypoosmolar hyponatremia, 2/2 increased solute rich water loss from the colostomy combined with free water intake, and decreased GFR. Resolved with IV fluids administration. Hypokalemia, secondary to increased GI losses from colostomy, resolved  HTN, holding BP medications  ---------------------------------------------  UTI, on ceftriaxone  Type 2 diabetes mellitus on Metformin. To use insulin while inpatient. Hold Metformin. Hx of colon cancer s/p hemicolectomy, left-sided colostomy and right-sided mucous fistula. Increased watery output from colostomy, started on fiber and cholestyramine by general surgery  Hypercoagulable state with factor V Leiden, on apixaban.   Normocytic anemia, likely secondary to subcapsular hematoma of right lobe of liver per CT scan    Plan:    Decrease NS at 50 cc/hr  Discontinue fluid restriction  Discharge planning    Electronically signed by Angie Gray MD on 3/5/2021 at 3:32 PM

## 2021-03-05 NOTE — PROGRESS NOTES
Physician Progress Note      Derrick Waite  CSN #:                  [id number]  :                       1950  ADMIT DATE:       3/2/2021 12:30 PM  [address] [place] DATE:  RESPONDING  PROVIDER #:        Walt Hatchet MD          QUERY TEXT:    Pt admitted with lethargy, hypotension and COLTON. Pt noted to have altered   mental status. If possible, please document in the progress notes and   discharge summary if you are evaluating and / or treating any of the   following: The medical record reflects the following:  Risk Factors: AMS  Clinical Indicators: Per ED  Patient presents from skilled nursing facility   due to altered mental status and hypotension; per surgical consult AMS and   hypotension, COLTON and hyponatremia likely secondary to high output ostomy; per   nursing assessments patient alert and disoriented on admission now alert and   oriented x4  Treatment: IVF, Serial labs, continued inpatient monitoring on an intermediate   tele unit    Thank you  Red PLATT, RN, CCDS  Clinical Documentation Improvement  Options provided:  -- Metabolic encephalopathy  -- Toxic metabolic encephalopathy  -- Delirium due to, Please specify cause. -- Delirium  -- Other - I will add my own diagnosis  -- Disagree - Not applicable / Not valid  -- Disagree - Clinically unable to determine / Unknown  -- Refer to Clinical Documentation Reviewer    PROVIDER RESPONSE TEXT:    This patient has metabolic encephalopathy.     Query created by: Sanjuanita Sanchez on 9871 5:61 AM      Electronically signed by:  Walt Hatchet MD 3/5/2021 3:15 PM

## 2021-03-05 NOTE — CARE COORDINATION
Precert initiated today for return to [facility]. Covid 3/2 (-). Ambulance form on soft chart.  CM to follow    Juancarlos EUGENEN, RN  [address] Case Management  [phone]

## 2021-03-06 LAB
ANION GAP SERPL CALCULATED.3IONS-SCNC: 12 MMOL/L (ref 7–16)
BUN BLDV-MCNC: 18 MG/DL (ref 8–23)
CALCIUM SERPL-MCNC: 9.8 MG/DL (ref 8.6–10.2)
CHLORIDE BLD-SCNC: 94 MMOL/L (ref 98–107)
CHLORIDE URINE RANDOM: <20 MMOL/L
CO2: 32 MMOL/L (ref 22–29)
CREAT SERPL-MCNC: 0.9 MG/DL (ref 0.5–1)
GFR AFRICAN AMERICAN: >60
GFR NON-AFRICAN AMERICAN: >60 ML/MIN/1.73
GLUCOSE BLD-MCNC: 86 MG/DL (ref 74–99)
METER GLUCOSE: 130 MG/DL (ref 74–99)
METER GLUCOSE: 197 MG/DL (ref 74–99)
METER GLUCOSE: 86 MG/DL (ref 74–99)
METER GLUCOSE: 98 MG/DL (ref 74–99)
POTASSIUM SERPL-SCNC: 3.6 MMOL/L (ref 3.5–5)
SODIUM BLD-SCNC: 138 MMOL/L (ref 132–146)

## 2021-03-06 PROCEDURE — 1200000000 HC SEMI PRIVATE

## 2021-03-06 PROCEDURE — 80048 BASIC METABOLIC PNL TOTAL CA: CPT

## 2021-03-06 PROCEDURE — 82962 GLUCOSE BLOOD TEST: CPT

## 2021-03-06 PROCEDURE — 6370000000 HC RX 637 (ALT 250 FOR IP): Performed by: INTERNAL MEDICINE

## 2021-03-06 PROCEDURE — 2500000003 HC RX 250 WO HCPCS: Performed by: INTERNAL MEDICINE

## 2021-03-06 PROCEDURE — 36415 COLL VENOUS BLD VENIPUNCTURE: CPT

## 2021-03-06 PROCEDURE — 6370000000 HC RX 637 (ALT 250 FOR IP): Performed by: STUDENT IN AN ORGANIZED HEALTH CARE EDUCATION/TRAINING PROGRAM

## 2021-03-06 PROCEDURE — 82436 ASSAY OF URINE CHLORIDE: CPT

## 2021-03-06 RX ORDER — LEVOFLOXACIN 250 MG/1
250 TABLET ORAL DAILY
Status: DISCONTINUED | OUTPATIENT
Start: 2021-03-06 | End: 2021-03-09

## 2021-03-06 RX ADMIN — CHOLESTYRAMINE 4 G: 4 POWDER, FOR SUSPENSION ORAL at 22:17

## 2021-03-06 RX ADMIN — INSULIN LISPRO 1 UNITS: 100 INJECTION, SOLUTION INTRAVENOUS; SUBCUTANEOUS at 16:24

## 2021-03-06 RX ADMIN — CALCIUM 500 MG: 500 TABLET ORAL at 22:17

## 2021-03-06 RX ADMIN — APIXABAN 5 MG: 5 TABLET, FILM COATED ORAL at 08:00

## 2021-03-06 RX ADMIN — LEVOFLOXACIN 250 MG: 500 TABLET, FILM COATED ORAL at 22:17

## 2021-03-06 RX ADMIN — APIXABAN 5 MG: 5 TABLET, FILM COATED ORAL at 22:17

## 2021-03-06 RX ADMIN — CHOLESTYRAMINE 4 G: 4 POWDER, FOR SUSPENSION ORAL at 08:01

## 2021-03-06 RX ADMIN — CALCIUM 500 MG: 500 TABLET ORAL at 08:01

## 2021-03-06 RX ADMIN — CALCIUM POLYCARBOPHIL 625 MG: 625 TABLET, FILM COATED ORAL at 08:01

## 2021-03-06 RX ADMIN — ANTI-FUNGAL POWDER MICONAZOLE NITRATE TALC FREE: 1.42 POWDER TOPICAL at 22:17

## 2021-03-06 RX ADMIN — FERROUS SULFATE TAB 325 MG (65 MG ELEMENTAL FE) 325 MG: 325 (65 FE) TAB at 08:00

## 2021-03-06 ASSESSMENT — PAIN SCALES - GENERAL
PAINLEVEL_OUTOF10: 0
PAINLEVEL_OUTOF10: 0

## 2021-03-06 NOTE — PLAN OF CARE
Problem: Falls - Risk of:  Goal: Will remain free from falls  Description: Will remain free from falls  3/6/2021 1059 by Holger Soni RN  Outcome: Met This Shift  3/6/2021 0420 by Madhuri Hawley RN  Outcome: Met This Shift  Goal: Absence of physical injury  Description: Absence of physical injury  3/6/2021 1059 by Holger Soni RN  Outcome: Met This Shift  3/6/2021 0420 by Madhuri Hawley RN  Outcome: Met This Shift     Problem: Skin Integrity:  Goal: Will show no infection signs and symptoms  Description: Will show no infection signs and symptoms  3/6/2021 1059 by Holger Soni RN  Outcome: Met This Shift  3/6/2021 0420 by Madhuri Hawley RN  Outcome: Met This Shift  Goal: Absence of new skin breakdown  Description: Absence of new skin breakdown  3/6/2021 1059 by Holger Soni RN  Outcome: Met This Shift  3/6/2021 0420 by Madhuri Hawley RN  Outcome: Met This Shift     Problem:  Bowel/Gastric:  Goal: Complications related to the disease process, condition or treatment will be avoided or minimized  Outcome: Met This Shift  Goal: Will be independent with ostomy care  Outcome: Met This Shift

## 2021-03-06 NOTE — PROGRESS NOTES
Department of Internal Medicine  Nephrology Progress Note      Events reviewed. SUBJECTIVE:  We are following Mrs. Banuelos for COLTON, hyponatremia, and hyperkalemia. She reports no complaints.     Physical Exam:    VITALS:  /62   Pulse 84   Temp 97.8 °F (36.6 °C) (Oral)   Resp 18   Ht 5' 2\" (1.575 m)   Wt 188 lb (85.3 kg)   SpO2 95%   BMI 34.39 kg/m²   24HR INTAKE/OUTPUT:      Intake/Output Summary (Last 24 hours) at 3/6/2021 3503  Last data filed at 3/6/2021 0030  Gross per 24 hour   Intake 460 ml   Output 1580 ml   Net -1120 ml       General Appearance: alert and oriented to person, place and time, well developed and well- nourished, in no acute distress  Skin: warm and dry, no rash or erythema  Head: normocephalic and atraumatic  Eyes: pupils equal, round, and reactive to light, extraocular eye movements intact, conjunctivae normal  ENT: tympanic membrane, external ear and ear canal normal bilaterally, nose without deformity, nasal mucosa and turbinates normal without polyps  Neck: supple and non-tender without mass, no thyromegaly or thyroid nodules, no cervical lymphadenopathy  Pulmonary/Chest: clear to auscultation bilaterally- no wheezes, rales or rhonchi, normal air movement, no respiratory distress  Cardiovascular: normal rate, regular rhythm, normal S1 and S2, no murmurs, rubs, clicks, or gallops,   Abdomen: soft, non-tender, non-distended, normal bowel sounds,  left colostomy bag with watery output, right mucous fistula with greenish mucus  Extremities: no cyanosis, clubbing; minimal pitting edema of bilateral lower extremity     Scheduled Meds:   miconazole   Topical BID    cefTRIAXone (ROCEPHIN) IV  1,000 mg Intravenous Q24H    insulin lispro  0-6 Units Subcutaneous TID WC    insulin lispro  0-3 Units Subcutaneous Nightly    polycarbophil  625 mg Oral Daily    cholestyramine  1 packet Oral BID    sodium chloride  1,000 mL Intravenous Once    sodium chloride flush  10 mL Intravenous in the mid mesentery fat planes, free   intraperitoneal air or ascites or indication for bowel obstruction. CXR 3/2/2021   Faint reticular airspace opacities at both lung bases may be on the basis of   pulmonary edema or atypical/viral pneumonia. BRIEF SUMMARY OF INITIAL CONSULT:    Briefly Basia Trent is 79 y.o. female with history of colon cancer (s/p right hemicolectomy, small bowel enterotomy, repair than resection, creation of loop proximal ileostomy and loop distal ileal mucous fistula; complication with abdominal abscess s/p drainage removal), trach decannulated, hypertension (carvedilol and hydralazine), factor V Leiden with chronic DVT of bilateral lower extremity (Eliquis, plan to switch to 2.5 mg twice daily after April 2), type 2 diabetes mellitus (metformin 1000 mg twice daily), history of COPD, who was admitted on 3/2/2021 from skilled nursing facility for complaints of hypotension and altered mental status. Per patient's daughter her blood pressure was 60 / 20 mmHg in the morning, EMS was called who gave her IV fluids and her blood pressure was 90 x 40 mmHg on arrival to the ED. Patient complained that she is continued to have increased output from her ileostomy over the past couple of days, has been drinking water due to feeling thirsty and chewing on ice cubes. In the ED, initial evaluation showed patient to have hyponatremia with sodium of 123 meq/L, hyperkalemia with potassium of 6.1 MEQ/L, low chloride 82 mmol/L, BUN/creatinine of 50/2.8. Nephrology was consulted for electrolyte abnormality and COLTON. Problems resolved:  Hyperkalemia 2/2 COLTON (decreased GFR), resolved. K levels improved. IMPRESSION/RECOMMENDATIONS:      COLTON stage III, volume responsive prerenal COLTON, 2/2 increased output from colostomy with decreased intake. FeNa 0.4% supports prerenal component.    Resolved with IV fluids administration.     Hypovolemic hypoosmolar hyponatremia, 2/2 increased solute rich water loss from the colostomy combined with free water intake, and decreased GFR. Resolved with IV fluids administration. Hypokalemia, secondary to increased GI losses from colostomy, resolved  HTN, holding BP medications  ---------------------------------------------  UTI, on ceftriaxone  Type 2 diabetes mellitus on Metformin. To use insulin while inpatient. Hold Metformin. Hx of colon cancer s/p hemicolectomy, left-sided colostomy and right-sided mucous fistula. Increased watery output from colostomy, started on fiber and cholestyramine by general surgery  Hypercoagulable state with factor V Leiden, on apixaban. Normocytic anemia, likely secondary to subcapsular hematoma of right lobe of liver per CT scan  10.  Metabolic alkalosis ?chloride responsive  Plan:    Continue to monitor labs  Check urine chloride level now  Discharge planning    Electronically signed by ERNESTINE Lebron CNP on 3/6/2021 at 9:37 AM

## 2021-03-06 NOTE — PROGRESS NOTES
Subjective:  Feeling ok  No CP or SOB  No fever or chills   No uncontrolled pain  No vomiting or diarrhea     Objective:    /62   Pulse 84   Temp 97.8 °F (36.6 °C) (Oral)   Resp 18   Ht 5' 2\" (1.575 m)   Wt 188 lb (85.3 kg)   SpO2 95%   BMI 34.39 kg/m²     24HR INTAKE/OUTPUT:      Intake/Output Summary (Last 24 hours) at 3/6/2021 1308  Last data filed at 3/6/2021 1056  Gross per 24 hour   Intake 460 ml   Output 2050 ml   Net -1590 ml       General appearance: NAD, conversant  Neck: FROM, supple   Lungs: Clear bilaterally no wheezes, no rhonchi, no crackles  CV: RRR, no MRGs; normal carotid upstroke and amplitude without Bruits  Abdomen: Soft, non-tender; no masses or HSM  Extremities: No edema, no cyanosis, no clubbing  Skin: Intact no rash, no lesions, no ulcers    Psych: Alert and oriented normal affect  Neuro: Nonfocal  Most Recent Labs  Lab Results   Component Value Date    WBC 6.5 03/05/2021    HGB 10.0 (L) 03/05/2021    HCT 31.3 (L) 03/05/2021     03/05/2021     03/06/2021    K 3.6 03/06/2021    CL 94 (L) 03/06/2021    CREATININE 0.9 03/06/2021    BUN 18 03/06/2021    CO2 32 (H) 03/06/2021    GLUCOSE 86 03/06/2021    ALT 17 03/02/2021    AST 51 (H) 03/02/2021    INR 2.0 03/02/2021    TSH 3.73 10/06/2020    LABA1C 8.9 (H) 10/06/2020    LABMICR 25.8 (H) 10/06/2020     Recent Labs     03/05/21  0431   MG 1.6     Lab Results   Component Value Date    CALCIUM 9.8 03/06/2021    PHOS 2.6 03/05/2021        MRI BRAIN WO CONTRAST   Final Result      There is no acute infarction, intracranial hemorrhage, or intracranial mass   lesion. Mild chronic microangiopathic ischemic disease. Mild generalized volume loss. CT HEAD WO CONTRAST   Final Result   No acute intracranial abnormality. CT ABDOMEN PELVIS WO CONTRAST Additional Contrast? Oral   Final Result   1.   Relatively recent postoperative changes with incomplete closure of the   subcutaneous soft tissues overlying the linea alba of the anterior rectus   abdominal muscle across the midline. 2.  Multiple previous bowel surgery with overall shortening of the bowel. 3.  Presence of a right and left-sided patent ostomy, ileostomy on the right,   jejunostomy on the left. 4.  More late subcapsular hematoma of the right lobe of the liver 7.7 x 2 x   4.5 cm.   5.  Status post cholecystectomy, no dilatation biliary tree pancreatic ductal   system. 6.  No obstructive uropathy. 7.  No acute inflammatory changes in the mid mesentery fat planes, free   intraperitoneal air or ascites or indication for bowel obstruction. XR CHEST PORTABLE   Final Result   Faint reticular airspace opacities at both lung bases may be on the basis of   pulmonary edema or atypical/viral pneumonia. Assessment    Principal Problem:    Acute kidney injury ([address]  Active Problems:    Essential hypertension    Chronic deep vein thrombosis (DVT) of lower extremity (HCC)    Type 2 diabetes mellitus with complication, without long-term current use of insulin (HCC)    Chronic diastolic (congestive) heart failure (HCC)    Factor V deficiency (HCC)    CKD (chronic kidney disease) stage 3, GFR 30-59 ml/min    Hyponatremia    Hyperkalemia    UTI (urinary tract infection)    Hypotension    Prolonged Q-T interval on ECG    Sepsis ([address]  Resolved Problems:    * No resolved hospital problems. *      Plan:  Continue ceftriaxone-->Levoflox     BP well controlled now, off antihypertensives.     Euvolemic in terms of CHF     Hyponatremia resolved. D/c IV fluids.   Eating and drinking      Continue apixaban for FVL/DVT     Medically ready for d/c, awaiting pre-cert to return to SNF    Electronically signed by Chris Gunter MD on 3/6/2021 at 1:08 PM

## 2021-03-07 LAB
ANION GAP SERPL CALCULATED.3IONS-SCNC: 13 MMOL/L (ref 7–16)
ANION GAP SERPL CALCULATED.3IONS-SCNC: 8 MMOL/L (ref 7–16)
BLOOD CULTURE, ROUTINE: NORMAL
BUN BLDV-MCNC: 16 MG/DL (ref 8–23)
BUN BLDV-MCNC: 16 MG/DL (ref 8–23)
CALCIUM SERPL-MCNC: 10.1 MG/DL (ref 8.6–10.2)
CALCIUM SERPL-MCNC: 11.1 MG/DL (ref 8.6–10.2)
CHLORIDE BLD-SCNC: 87 MMOL/L (ref 98–107)
CHLORIDE BLD-SCNC: 91 MMOL/L (ref 98–107)
CO2: 31 MMOL/L (ref 22–29)
CO2: 32 MMOL/L (ref 22–29)
CREAT SERPL-MCNC: 0.9 MG/DL (ref 0.5–1)
CREAT SERPL-MCNC: 1.1 MG/DL (ref 0.5–1)
GFR AFRICAN AMERICAN: 59
GFR AFRICAN AMERICAN: >60
GFR NON-AFRICAN AMERICAN: 49 ML/MIN/1.73
GFR NON-AFRICAN AMERICAN: >60 ML/MIN/1.73
GLUCOSE BLD-MCNC: 101 MG/DL (ref 74–99)
GLUCOSE BLD-MCNC: 198 MG/DL (ref 74–99)
METER GLUCOSE: 106 MG/DL (ref 74–99)
METER GLUCOSE: 109 MG/DL (ref 74–99)
METER GLUCOSE: 171 MG/DL (ref 74–99)
METER GLUCOSE: 193 MG/DL (ref 74–99)
POTASSIUM SERPL-SCNC: 3.6 MMOL/L (ref 3.5–5)
POTASSIUM SERPL-SCNC: 4.5 MMOL/L (ref 3.5–5)
SODIUM BLD-SCNC: 130 MMOL/L (ref 132–146)
SODIUM BLD-SCNC: 132 MMOL/L (ref 132–146)

## 2021-03-07 PROCEDURE — 6370000000 HC RX 637 (ALT 250 FOR IP): Performed by: STUDENT IN AN ORGANIZED HEALTH CARE EDUCATION/TRAINING PROGRAM

## 2021-03-07 PROCEDURE — 6360000002 HC RX W HCPCS: Performed by: INTERNAL MEDICINE

## 2021-03-07 PROCEDURE — 6370000000 HC RX 637 (ALT 250 FOR IP): Performed by: INTERNAL MEDICINE

## 2021-03-07 PROCEDURE — 82962 GLUCOSE BLOOD TEST: CPT

## 2021-03-07 PROCEDURE — 97530 THERAPEUTIC ACTIVITIES: CPT

## 2021-03-07 PROCEDURE — 80048 BASIC METABOLIC PNL TOTAL CA: CPT

## 2021-03-07 PROCEDURE — 1200000000 HC SEMI PRIVATE

## 2021-03-07 PROCEDURE — 36415 COLL VENOUS BLD VENIPUNCTURE: CPT

## 2021-03-07 PROCEDURE — 94640 AIRWAY INHALATION TREATMENT: CPT

## 2021-03-07 PROCEDURE — 97535 SELF CARE MNGMENT TRAINING: CPT

## 2021-03-07 RX ORDER — SODIUM CHLORIDE 1000 MG
1 TABLET, SOLUBLE MISCELLANEOUS 2 TIMES DAILY WITH MEALS
Status: DISCONTINUED | OUTPATIENT
Start: 2021-03-07 | End: 2021-03-08

## 2021-03-07 RX ADMIN — CALCIUM 500 MG: 500 TABLET ORAL at 20:48

## 2021-03-07 RX ADMIN — LEVOFLOXACIN 250 MG: 500 TABLET, FILM COATED ORAL at 08:46

## 2021-03-07 RX ADMIN — APIXABAN 5 MG: 5 TABLET, FILM COATED ORAL at 20:48

## 2021-03-07 RX ADMIN — CALCIUM 500 MG: 500 TABLET ORAL at 08:46

## 2021-03-07 RX ADMIN — INSULIN LISPRO 1 UNITS: 100 INJECTION, SOLUTION INTRAVENOUS; SUBCUTANEOUS at 20:48

## 2021-03-07 RX ADMIN — APIXABAN 5 MG: 5 TABLET, FILM COATED ORAL at 08:46

## 2021-03-07 RX ADMIN — Medication 1 G: at 16:28

## 2021-03-07 RX ADMIN — ANTI-FUNGAL POWDER MICONAZOLE NITRATE TALC FREE: 1.42 POWDER TOPICAL at 20:48

## 2021-03-07 RX ADMIN — ANTI-FUNGAL POWDER MICONAZOLE NITRATE TALC FREE: 1.42 POWDER TOPICAL at 08:47

## 2021-03-07 RX ADMIN — ARFORMOTEROL TARTRATE 15 MCG: 15 SOLUTION RESPIRATORY (INHALATION) at 19:36

## 2021-03-07 RX ADMIN — FERROUS SULFATE TAB 325 MG (65 MG ELEMENTAL FE) 325 MG: 325 (65 FE) TAB at 08:45

## 2021-03-07 RX ADMIN — CALCIUM POLYCARBOPHIL 625 MG: 625 TABLET, FILM COATED ORAL at 08:46

## 2021-03-07 RX ADMIN — CHOLESTYRAMINE 4 G: 4 POWDER, FOR SUSPENSION ORAL at 08:46

## 2021-03-07 RX ADMIN — BUDESONIDE 250 MCG: 0.25 SUSPENSION RESPIRATORY (INHALATION) at 19:37

## 2021-03-07 RX ADMIN — INSULIN LISPRO 1 UNITS: 100 INJECTION, SOLUTION INTRAVENOUS; SUBCUTANEOUS at 17:30

## 2021-03-07 ASSESSMENT — PAIN SCALES - GENERAL
PAINLEVEL_OUTOF10: 0
PAINLEVEL_OUTOF10: 0

## 2021-03-07 NOTE — PROGRESS NOTES
Physical Therapy    Physical Therapy Initial Assessment     Name: Jason Chinchilla  : 1950  MRN: [id number]    Referring Provider:  Lili Smith MD    Date of Service: 3/7/2021    Evaluating PT:  Lissy Akbar PT, DPT [id number]      Room #:  9772/3271-M  Diagnosis:  Acute kidney injury  PMHx/PSHx:  Obesity, L bundle branch block, hyperlipidemia, HTN, colostomy, cholecystectomy, hernia repair  Procedure/Surgery:  None this admission  Precautions:  Falls, ostomy x2, tracheostomy  Equipment Needs:  TBD    SUBJECTIVE:  Pt admitted from [facility] ([facility]) where she had been for approx 2 weeks. Pt has been working towards sit<>stand and pivot transfers with assist x2 in therapy. She has been receiving assist to wayne to w/c. Attempted to see pt x 2 this am, NA eating breakfast very slowly.   Will follow at a later time     Jonelle Beverly PTA [id number]

## 2021-03-07 NOTE — PROGRESS NOTES
Subjective:  Feeling ok no complaints  No CP or SOB  No fever or chills   No uncontrolled pain  No vomiting or diarrhea     Objective:    /77   Pulse 103   Temp 97.4 °F (36.3 °C) (Temporal)   Resp 16   Ht 5' 2\" (1.575 m)   Wt 188 lb (85.3 kg)   SpO2 97%   BMI 34.39 kg/m²     24HR INTAKE/OUTPUT:      Intake/Output Summary (Last 24 hours) at 3/7/2021 0809  Last data filed at 3/6/2021 1732  Gross per 24 hour   Intake 540 ml   Output 1450 ml   Net -910 ml       General appearance: NAD, conversant  Neck: FROM, supple   Lungs: Clear bilaterally no wheezes, no rhonchi, no crackles  CV: RRR, no MRGs; normal carotid upstroke and amplitude without Bruits  Abdomen: Soft, non-tender; no masses or HSM  Extremities: No edema, no cyanosis, no clubbing  Skin: Intact no rash, no lesions, no ulcers    Psych: Alert and oriented normal affect  Neuro: Nonfocal  Most Recent Labs  Lab Results   Component Value Date    WBC 6.5 03/05/2021    HGB 10.0 (L) 03/05/2021    HCT 31.3 (L) 03/05/2021     03/05/2021     03/06/2021    K 3.6 03/06/2021    CL 94 (L) 03/06/2021    CREATININE 0.9 03/06/2021    BUN 18 03/06/2021    CO2 32 (H) 03/06/2021    GLUCOSE 86 03/06/2021    ALT 17 03/02/2021    AST 51 (H) 03/02/2021    INR 2.0 03/02/2021    TSH 3.73 10/06/2020    LABA1C 8.9 (H) 10/06/2020    LABMICR 25.8 (H) 10/06/2020     Recent Labs     03/05/21  0431   MG 1.6     Lab Results   Component Value Date    CALCIUM 9.8 03/06/2021    PHOS 2.6 03/05/2021        MRI BRAIN WO CONTRAST   Final Result      There is no acute infarction, intracranial hemorrhage, or intracranial mass   lesion. Mild chronic microangiopathic ischemic disease. Mild generalized volume loss. CT HEAD WO CONTRAST   Final Result   No acute intracranial abnormality. CT ABDOMEN PELVIS WO CONTRAST Additional Contrast? Oral   Final Result   1.   Relatively recent postoperative changes with incomplete closure of the   subcutaneous soft tissues overlying the linea alba of the anterior rectus   abdominal muscle across the midline. 2.  Multiple previous bowel surgery with overall shortening of the bowel. 3.  Presence of a right and left-sided patent ostomy, ileostomy on the right,   jejunostomy on the left. 4.  More late subcapsular hematoma of the right lobe of the liver 7.7 x 2 x   4.5 cm.   5.  Status post cholecystectomy, no dilatation biliary tree pancreatic ductal   system. 6.  No obstructive uropathy. 7.  No acute inflammatory changes in the mid mesentery fat planes, free   intraperitoneal air or ascites or indication for bowel obstruction. XR CHEST PORTABLE   Final Result   Faint reticular airspace opacities at both lung bases may be on the basis of   pulmonary edema or atypical/viral pneumonia. Assessment    Principal Problem:    Acute kidney injury ([address]  Active Problems:    Essential hypertension    Chronic deep vein thrombosis (DVT) of lower extremity (HCC)    Type 2 diabetes mellitus with complication, without long-term current use of insulin (HCC)    Chronic diastolic (congestive) heart failure (HCC)    Factor V deficiency (HCC)    CKD (chronic kidney disease) stage 3, GFR 30-59 ml/min    Hyponatremia    Hyperkalemia    UTI (urinary tract infection)    Hypotension    Prolonged Q-T interval on ECG    Sepsis ([address]  Resolved Problems:    * No resolved hospital problems. *      Plan:  Continue ceftriaxone-->Levoflox p.o.     BP well controlled now, off antihypertensives.     Euvolemic in terms of CHF     Hyponatremia resolved. D/c IV fluids.   Eating and drinking      Continue apixaban for FVL/DVT     Medically ready for d/c, awaiting pre-cert to return to [facility]    Electronically signed by Ang Figueroa MD on 3/7/2021 at 8:09 AM

## 2021-03-07 NOTE — PROGRESS NOTES
Message sent to wound care nurse regarding leaking ostomy. Patient's peristomal skin is very raw, red and multiple measures have been attempted to get ostomy bag to stay with no leakage.

## 2021-03-07 NOTE — PROGRESS NOTES
Physical Therapy    Physical Therapy Initial Assessment     Name: Saima Puente  : 1950  MRN: [id number]    Referring Provider:  Estefania Rahman MD    Date of Service: 3/7/2021    Evaluating PT:  Kannan Baugh PT, DPT [id number]      Room #:  0187/7831-Y  Diagnosis:  Acute kidney injury  PMHx/PSHx:  Obesity, L bundle branch block, hyperlipidemia, HTN, colostomy, cholecystectomy, hernia repair  Procedure/Surgery:  None this admission  Precautions:  Falls, ostomy x2, tracheostomy  Equipment Needs:  TBD    SUBJECTIVE:  Pt admitted from [place] [address] ([facility]) where she had been for approx 2 weeks. Pt has been working towards sit<>stand and pivot transfers with assist x2 in therapy. She has been receiving assist to wayne to w/c. Attempted to see pt @ 1203  this pm, pt declined citing \" they were messing w/ my stomach and the ostomy bag two different times and I just don't feel up to therapy right now. \"   Will follow at a later time     Elle Memos PTA [id number]

## 2021-03-07 NOTE — PROGRESS NOTES
Department of Internal Medicine  Nephrology Progress Note      Events reviewed. SUBJECTIVE:  We are following Mrs. Banuelos for COLTON, hyponatremia, and hyperkalemia. She reports no complaints.     Physical Exam:    VITALS:  /77   Pulse 103   Temp 97.4 °F (36.3 °C) (Temporal)   Resp 16   Ht 5' 2\" (1.575 m)   Wt 188 lb (85.3 kg)   SpO2 97%   BMI 34.39 kg/m²   24HR INTAKE/OUTPUT:      Intake/Output Summary (Last 24 hours) at 3/7/2021 0931  Last data filed at 3/6/2021 1732  Gross per 24 hour   Intake 540 ml   Output 1450 ml   Net -910 ml       General Appearance: alert and oriented to person, place and time, well developed and well- nourished, in no acute distress  Skin: warm and dry, no rash or erythema  Head: normocephalic and atraumatic  Eyes: pupils equal, round, and reactive to light, extraocular eye movements intact, conjunctivae normal  ENT: tympanic membrane, external ear and ear canal normal bilaterally, nose without deformity, nasal mucosa and turbinates normal without polyps  Neck: supple and non-tender without mass, no thyromegaly or thyroid nodules, no cervical lymphadenopathy  Pulmonary/Chest: clear to auscultation bilaterally- no wheezes, rales or rhonchi, normal air movement, no respiratory distress  Cardiovascular: normal rate, regular rhythm, normal S1 and S2, no murmurs, rubs, clicks, or gallops,   Abdomen: soft, non-tender, non-distended, normal bowel sounds,  left colostomy bag with watery output, right mucous fistula with greenish mucus  Extremities: no cyanosis, clubbing; minimal pitting edema of bilateral lower extremity     Scheduled Meds:   levoFLOXacin  250 mg Oral Daily    miconazole   Topical BID    insulin lispro  0-6 Units Subcutaneous TID WC    insulin lispro  0-3 Units Subcutaneous Nightly    polycarbophil  625 mg Oral Daily    cholestyramine  1 packet Oral BID    sodium chloride  1,000 mL Intravenous Once    sodium chloride flush  10 mL Intravenous Once    apixaban 5 mg Oral BID    calcium elemental  500 mg Oral BID    ferrous sulfate  325 mg Oral Daily with breakfast    budesonide  0.25 mg Nebulization BID    And    Arformoterol Tartrate  15 mcg Nebulization BID     Continuous Infusions:   dextrose       PRN Meds:.glucose, dextrose, glucagon (rDNA), dextrose, albuterol, traMADol    DATA:    CBC:   Lab Results   Component Value Date    WBC 6.5 03/05/2021    RBC 3.43 03/05/2021    HGB 10.0 03/05/2021    HCT 31.3 03/05/2021    MCV 91.3 03/05/2021    MCH 29.2 03/05/2021    MCHC 31.9 03/05/2021    RDW 14.5 03/05/2021     03/05/2021    MPV 8.9 03/05/2021     CMP:    Lab Results   Component Value Date     03/07/2021    K 3.6 03/07/2021    K 6.1 03/02/2021    CL 91 03/07/2021    CO2 31 03/07/2021    BUN 16 03/07/2021    CREATININE 0.9 03/07/2021    GFRAA >60 03/07/2021    AGRATIO 0.7 11/27/2020    LABGLOM >60 03/07/2021    GLUCOSE 101 03/07/2021    PROT 8.9 03/02/2021    LABALBU 3.0 03/02/2021    CALCIUM 10.1 03/07/2021    BILITOT 1.1 03/02/2021    ALKPHOS 101 03/02/2021    AST 51 03/02/2021    ALT 17 03/02/2021     Magnesium:    Lab Results   Component Value Date    MG 1.6 03/05/2021     Phosphorus:    Lab Results   Component Value Date    PHOS 2.6 03/05/2021     Radiology Review:      CT of abdomen and pelvis without IV contrast 3/2/2021   1.  Relatively recent postoperative changes with incomplete closure of the   subcutaneous soft tissues overlying the linea alba of the anterior rectus   abdominal muscle across the midline. 2.  Multiple previous bowel surgery with overall shortening of the bowel. 3.  Presence of a right and left-sided patent ostomy, ileostomy on the right,   jejunostomy on the left. 4.  More late subcapsular hematoma of the right lobe of the liver 7.7 x 2 x   4.5 cm. 5.  Status post cholecystectomy, no dilatation biliary tree pancreatic ductal   system. 6.  No obstructive uropathy.    7.  No acute inflammatory changes in the mid mesentery fat planes, free   intraperitoneal air or ascites or indication for bowel obstruction. CXR 3/2/2021   Faint reticular airspace opacities at both lung bases may be on the basis of   pulmonary edema or atypical/viral pneumonia. BRIEF SUMMARY OF INITIAL CONSULT:    Briefly Lisa Yusuf is 79 y.o. female with history of colon cancer (s/p right hemicolectomy, small bowel enterotomy, repair than resection, creation of loop proximal ileostomy and loop distal ileal mucous fistula; complication with abdominal abscess s/p drainage removal), trach decannulated, hypertension (carvedilol and hydralazine), factor V Leiden with chronic DVT of bilateral lower extremity (Eliquis, plan to switch to 2.5 mg twice daily after April 2), type 2 diabetes mellitus (metformin 1000 mg twice daily), history of COPD, who was admitted on 3/2/2021 from skilled nursing facility for complaints of hypotension and altered mental status. Per patient's daughter her blood pressure was 60 / 20 mmHg in the morning, EMS was called who gave her IV fluids and her blood pressure was 90 x 40 mmHg on arrival to the ED. Patient complained that she is continued to have increased output from her ileostomy over the past couple of days, has been drinking water due to feeling thirsty and chewing on ice cubes. In the ED, initial evaluation showed patient to have hyponatremia with sodium of 123 meq/L, hyperkalemia with potassium of 6.1 MEQ/L, low chloride 82 mmol/L, BUN/creatinine of 50/2.8. Nephrology was consulted for electrolyte abnormality and COLTON. Problems resolved:  Hyperkalemia 2/2 COLTON (decreased GFR), resolved. K levels improved. IMPRESSION/RECOMMENDATIONS:      COLTON stage III, volume responsive prerenal COLTON, 2/2 increased output from colostomy with decreased intake. FeNa 0.4% supports prerenal component.    Resolved with IV fluids administration.     Recurrent Hypovolemic hypoosmolar hyponatremia, 2/2 increased solute rich water loss from the colostomy combined with free water intake, and decreased GFR. Resolved with IV fluids administration. Hypokalemia, secondary to increased GI losses from colostomy, resolved  HTN, holding BP medications  ---------------------------------------------  UTI, on ceftriaxone  Type 2 diabetes mellitus on Metformin. To use insulin while inpatient. Hold Metformin. Hx of colon cancer s/p hemicolectomy, left-sided colostomy and right-sided mucous fistula. Increased watery output from colostomy, started on fiber and cholestyramine by general surgery  Hypercoagulable state with factor V Leiden, on apixaban. Normocytic anemia, likely secondary to subcapsular hematoma of right lobe of liver per CT scan  10.  Metabolic alkalosis ?chloride responsive    Plan:    Continue to monitor labs  Start sodium chloride tabs one gm po bid  BMP at 12:00- watch sodium and in am  Discharge planning    Electronically signed by ERNESTINE Morel CNP on 3/7/2021 at 9:31 AM

## 2021-03-07 NOTE — PLAN OF CARE
Problem: Falls - Risk of:  Goal: Will remain free from falls  Description: Will remain free from falls  Outcome: Met This Shift     Problem: Falls - Risk of:  Goal: Absence of physical injury  Description: Absence of physical injury  Outcome: Met This Shift     Problem: Skin Integrity:  Goal: Will show no infection signs and symptoms  Description: Will show no infection signs and symptoms  Outcome: Met This Shift     Problem: Skin Integrity:  Goal: Absence of new skin breakdown  Description: Absence of new skin breakdown  Outcome: Met This Shift     Problem: Bowel/Gastric:  Description: [TRUNCATED] Module scope: This module is for use by staff nurses caring for ostomates, after the postoperative period, hospitalized for other conditions. This module is not intended to be all-inclusive and has been created for use by a wide variety o . .. Goal: Complications related to the disease process, condition or treatment will be avoided or minimized  Outcome: Met This Shift     Problem: Bowel/Gastric:  Description: [TRUNCATED] Module scope: This module is for use by staff nurses caring for ostomates, after the postoperative period, hospitalized for other conditions. This module is not intended to be all-inclusive and has been created for use by a wide variety o . ..   Goal: Will be independent with ostomy care  Outcome: Met This Shift

## 2021-03-07 NOTE — PROGRESS NOTES
Occupational Therapy  OT BEDSIDE TREATMENT NOTE      Date:3/7/2021  Patient Name: Bj Garcia  MRN: [id number]  : 1950  Room: [address] [place] [address]     Per OT Eval:   Referring Physician:  Monica Yepez MD     Evaluating OT:  Gio Cage, MOT, OTR/L #[id number]     AM-PAC Daily Activity Raw Score:    AM-PAC Daily Activity Raw Score:   (3/7/21)  Recommended Adaptive Equipment:  TBD as pt progresses      Reason for Admission:  Pt was transferred from SNF w/ lethargy, Hypotension     Diagnosis:     1. Acute kidney injury ([facility] [address]    2. Acute hyperkalemia    3. Acute hyponatremia    4. Hypotension, unspecified hypotension type       Procedures this admission:  None      Pertinent Medical History:  HTN, Cholecystectomy, Old Trach - currently healing to close per pt, Morbid Obesity, Recent Small Bowel Resection      Precautions:  Falls  Colostomy L LQ  Bustamante Catheter  L Foot Drop     Pt recently had a Small bowel resection - extensive hospitalization, transferred to an LTACH then transferred to a SNF ~ 2 weeks ago.     Home Living:  Currently a Resident of a SNF. Bathroom setup:  NA - bed-level ADLs   Equipment owned:  ?? - [employer], [employer], W/C     Prior Level of Function:  Receives assist for all Bed-level ADLs - Old Colostomy. [facility] staff utilizes a Miryam lift to transfer pt b/t surfaces. Able to complete Low-Pivot Transfers b/t surfaces w/ assist of 2 in therapy.   Assist for W/C mobility  Driving:  No  Occupation:  None     Pain Level:  Denies pain    Additional Complaints:  None     Vitals/Lab Values:  122/53, Room Air     Cognition: A & O x 4   Able to Follow Multi-Step Commands INDly              Memory:  good  (-)              Sequencing:  good (-)              Problem solving:  good (-)              Judgement/safety:  good (-)  Additional Comments:  Pt was pleasant, cooperative                   Functional Assessment:    Initial Eval Status  Date: 3-4-21 Treatment Status  Date: 3/7/21 Short Term/Long Term Goals  Treatment frequency: PRN 1-3 x/week   1-2 weeks   Feeding IND     Per pt report NT  NA   Grooming Set up     Able to complete simple tasks after set up w/ HOB in high carmen position     Min A  Completed while seated edge of bed SUP  Seated EOB   UB Dressing Mod A     Mod A to don/doff gown in high-carmen position  NT  Min A  Seated EOB   LB Dressing Dep     Max A of 2 for simulated tasks in supine  Pt ed for safety, adaptive techs/equip  Unable to stand for ax    NT   Max A   Bathing NT       NT   Max A   Toileting Dep     Colostomy and Bustamante Catheter  Nsg care    NT   Colostomy  NA   Bed Mobility  Rolling: Max A  Repositioning:  Max A of 2 toward HOB in supine   Supine to Sit:  NT    Sit to Supine:  NT      Initiated Transfer to EOB w/ Max A, however, Nsg arrived to room for Nsg care/colostomy care - pt repositioned in High-carmen position     Supine<>Sit: Mod A  Mod A   Functional Transfers Sit to stand:  NT  Stand to sit:  NT           Max A of 2   Functional Mobility NT            Max A of 2   B/t surfaces only   Balance Sitting:      Static:  Fair(-)    Dynamic:  Fair(-)  Sitting:      Static:  SBA        Activity Tolerance Fair(-)  Limited by general weakness/fatigue     Fair-      Visual/  Perceptual WFL  Glasses: Yes        Hearing WFL  Hearing Aids  No             Treatment: OT treatment provided this date includes:    ADL training-  Instruction/training on safety and adapted techniques for completion of ADLs: Facilitated grooming tasks while seated edge of bed for safety. Mod A to comb hair for thoroughness posteriorly with verbal cues for participation. Set-up/SBA to wash face and comb hair. SBA overall for sitting balance during ADLs.   Mobility training-  Instruction/training on safety and improved independence with bed mobility. Mod A supine<>sit with verbal/tactile cues for positioning and technique.  Mod A to complete scooting laterally while seated edge of bed with verbal cues for technique and hand placement.   Sitting EOB x ~20 minutes to improve dynamic sitting balance and activity tolerance during ADLs. SBA overall for balance.   Skilled monitoring of O2 sats-  Skilled monitoring of O2 sats, HR, and pt response throughout treatment. Comments: Upon arrival pt semi-supine in bed, agreeable to session with encouragement and education regarding purpose and benefits of participation in skilled OT. At end of session semi-supine in bed all lines and tubes intact, call light within reach. Nursing present to address colotomy care secondary to bag leaking. · Pt has made fair progress towards set goals. · Continue with current plan of care with improving activity tolerance and participation with self-care/functional transfers.        Treatment Time In:12:15            Treatment Time Out: 12:40    Total Treatment Time:   25 minutes        Treatment Charges: Mins Units   Ther Ex  62203     Manual Therapy 89938     Thera Activities 85668 17 1   ADL/Home Mgt 60956 8 1   Neuro Re-ed [id number]     Group Therapy      Orthotic manage/training  59122     Non-Billable Time     Total Timed Treatment 25 2       Completed by: Cary Cooper OTR/L #[id number]

## 2021-03-08 LAB
ALBUMIN SERPL-MCNC: 2.8 G/DL (ref 3.5–5.2)
ANION GAP SERPL CALCULATED.3IONS-SCNC: 12 MMOL/L (ref 7–16)
ANION GAP SERPL CALCULATED.3IONS-SCNC: 14 MMOL/L (ref 7–16)
BUN BLDV-MCNC: 19 MG/DL (ref 8–23)
BUN BLDV-MCNC: 19 MG/DL (ref 8–23)
CALCIUM IONIZED: 1.39 MMOL/L (ref 1.15–1.33)
CALCIUM SERPL-MCNC: 10.4 MG/DL (ref 8.6–10.2)
CALCIUM SERPL-MCNC: 10.7 MG/DL (ref 8.6–10.2)
CHLORIDE BLD-SCNC: 89 MMOL/L (ref 98–107)
CHLORIDE BLD-SCNC: 91 MMOL/L (ref 98–107)
CO2: 28 MMOL/L (ref 22–29)
CO2: 29 MMOL/L (ref 22–29)
CREAT SERPL-MCNC: 1.2 MG/DL (ref 0.5–1)
CREAT SERPL-MCNC: 1.3 MG/DL (ref 0.5–1)
CULTURE, BLOOD 2: NORMAL
GFR AFRICAN AMERICAN: 49
GFR AFRICAN AMERICAN: 54
GFR NON-AFRICAN AMERICAN: 40 ML/MIN/1.73
GFR NON-AFRICAN AMERICAN: 44 ML/MIN/1.73
GLUCOSE BLD-MCNC: 119 MG/DL (ref 74–99)
GLUCOSE BLD-MCNC: 148 MG/DL (ref 74–99)
MAGNESIUM: 1.3 MG/DL (ref 1.6–2.6)
METER GLUCOSE: 116 MG/DL (ref 74–99)
METER GLUCOSE: 121 MG/DL (ref 74–99)
METER GLUCOSE: 135 MG/DL (ref 74–99)
METER GLUCOSE: 147 MG/DL (ref 74–99)
PARATHYROID HORMONE INTACT: 9 PG/ML (ref 15–65)
POTASSIUM SERPL-SCNC: 4.1 MMOL/L (ref 3.5–5)
POTASSIUM SERPL-SCNC: 4.3 MMOL/L (ref 3.5–5)
SODIUM BLD-SCNC: 129 MMOL/L (ref 132–146)
SODIUM BLD-SCNC: 134 MMOL/L (ref 132–146)

## 2021-03-08 PROCEDURE — 6370000000 HC RX 637 (ALT 250 FOR IP): Performed by: INTERNAL MEDICINE

## 2021-03-08 PROCEDURE — 82040 ASSAY OF SERUM ALBUMIN: CPT

## 2021-03-08 PROCEDURE — 6370000000 HC RX 637 (ALT 250 FOR IP): Performed by: STUDENT IN AN ORGANIZED HEALTH CARE EDUCATION/TRAINING PROGRAM

## 2021-03-08 PROCEDURE — 2580000003 HC RX 258: Performed by: INTERNAL MEDICINE

## 2021-03-08 PROCEDURE — 6360000002 HC RX W HCPCS: Performed by: INTERNAL MEDICINE

## 2021-03-08 PROCEDURE — 36415 COLL VENOUS BLD VENIPUNCTURE: CPT

## 2021-03-08 PROCEDURE — 97530 THERAPEUTIC ACTIVITIES: CPT

## 2021-03-08 PROCEDURE — 83970 ASSAY OF PARATHORMONE: CPT

## 2021-03-08 PROCEDURE — 82962 GLUCOSE BLOOD TEST: CPT

## 2021-03-08 PROCEDURE — 97535 SELF CARE MNGMENT TRAINING: CPT

## 2021-03-08 PROCEDURE — 83735 ASSAY OF MAGNESIUM: CPT

## 2021-03-08 PROCEDURE — 94640 AIRWAY INHALATION TREATMENT: CPT

## 2021-03-08 PROCEDURE — 1200000000 HC SEMI PRIVATE

## 2021-03-08 PROCEDURE — 82330 ASSAY OF CALCIUM: CPT

## 2021-03-08 PROCEDURE — 80048 BASIC METABOLIC PNL TOTAL CA: CPT

## 2021-03-08 PROCEDURE — 2580000003 HC RX 258: Performed by: STUDENT IN AN ORGANIZED HEALTH CARE EDUCATION/TRAINING PROGRAM

## 2021-03-08 RX ORDER — SODIUM CHLORIDE 9 MG/ML
INJECTION, SOLUTION INTRAVENOUS CONTINUOUS
Status: DISCONTINUED | OUTPATIENT
Start: 2021-03-08 | End: 2021-03-08

## 2021-03-08 RX ORDER — CALCIUM POLYCARBOPHIL 625 MG 625 MG/1
625 TABLET ORAL 2 TIMES DAILY
Status: DISCONTINUED | OUTPATIENT
Start: 2021-03-08 | End: 2021-03-09

## 2021-03-08 RX ORDER — MAGNESIUM SULFATE IN WATER 40 MG/ML
2000 INJECTION, SOLUTION INTRAVENOUS ONCE
Status: COMPLETED | OUTPATIENT
Start: 2021-03-08 | End: 2021-03-08

## 2021-03-08 RX ORDER — LOPERAMIDE HYDROCHLORIDE 2 MG/15ML
2 SOLUTION ORAL 3 TIMES DAILY
Status: DISCONTINUED | OUTPATIENT
Start: 2021-03-08 | End: 2021-03-09

## 2021-03-08 RX ORDER — SODIUM CHLORIDE, SODIUM LACTATE, POTASSIUM CHLORIDE, CALCIUM CHLORIDE 600; 310; 30; 20 MG/100ML; MG/100ML; MG/100ML; MG/100ML
INJECTION, SOLUTION INTRAVENOUS CONTINUOUS
Status: DISCONTINUED | OUTPATIENT
Start: 2021-03-08 | End: 2021-03-08

## 2021-03-08 RX ORDER — SODIUM CHLORIDE 9 MG/ML
INJECTION, SOLUTION INTRAVENOUS CONTINUOUS
Status: DISCONTINUED | OUTPATIENT
Start: 2021-03-08 | End: 2021-03-09

## 2021-03-08 RX ORDER — DEXTROSE, SODIUM CHLORIDE, AND POTASSIUM CHLORIDE 5; .45; .15 G/100ML; G/100ML; G/100ML
INJECTION INTRAVENOUS CONTINUOUS
Status: DISCONTINUED | OUTPATIENT
Start: 2021-03-08 | End: 2021-03-08

## 2021-03-08 RX ORDER — OCTREOTIDE ACETATE 50 UG/ML
50 INJECTION, SOLUTION INTRAVENOUS; SUBCUTANEOUS 2 TIMES DAILY
Status: DISCONTINUED | OUTPATIENT
Start: 2021-03-08 | End: 2021-03-09

## 2021-03-08 RX ADMIN — TRAMADOL HYDROCHLORIDE 50 MG: 50 TABLET, FILM COATED ORAL at 20:52

## 2021-03-08 RX ADMIN — LOPERAMIDE HYDROCHLORIDE 2 MG: 2 SOLUTION ORAL at 12:19

## 2021-03-08 RX ADMIN — BUDESONIDE 250 MCG: 0.25 SUSPENSION RESPIRATORY (INHALATION) at 08:30

## 2021-03-08 RX ADMIN — MAGNESIUM SULFATE HEPTAHYDRATE 2000 MG: 40 INJECTION, SOLUTION INTRAVENOUS at 10:39

## 2021-03-08 RX ADMIN — CALCIUM POLYCARBOPHIL 625 MG: 625 TABLET, FILM COATED ORAL at 20:46

## 2021-03-08 RX ADMIN — APIXABAN 5 MG: 5 TABLET, FILM COATED ORAL at 10:34

## 2021-03-08 RX ADMIN — LEVOFLOXACIN 250 MG: 500 TABLET, FILM COATED ORAL at 10:33

## 2021-03-08 RX ADMIN — APIXABAN 5 MG: 5 TABLET, FILM COATED ORAL at 20:51

## 2021-03-08 RX ADMIN — SODIUM CHLORIDE: 9 INJECTION, SOLUTION INTRAVENOUS at 20:56

## 2021-03-08 RX ADMIN — FERROUS SULFATE TAB 325 MG (65 MG ELEMENTAL FE) 325 MG: 325 (65 FE) TAB at 10:34

## 2021-03-08 RX ADMIN — SODIUM CHLORIDE: 9 INJECTION, SOLUTION INTRAVENOUS at 10:38

## 2021-03-08 RX ADMIN — ARFORMOTEROL TARTRATE 15 MCG: 15 SOLUTION RESPIRATORY (INHALATION) at 08:30

## 2021-03-08 RX ADMIN — CHOLESTYRAMINE 4 G: 4 POWDER, FOR SUSPENSION ORAL at 10:34

## 2021-03-08 RX ADMIN — ANTI-FUNGAL POWDER MICONAZOLE NITRATE TALC FREE: 1.42 POWDER TOPICAL at 10:37

## 2021-03-08 RX ADMIN — OCTREOTIDE ACETATE 50 MCG: 50 INJECTION, SOLUTION INTRAVENOUS; SUBCUTANEOUS at 20:45

## 2021-03-08 RX ADMIN — ANTI-FUNGAL POWDER MICONAZOLE NITRATE TALC FREE: 1.42 POWDER TOPICAL at 20:48

## 2021-03-08 ASSESSMENT — PAIN DESCRIPTION - ORIENTATION: ORIENTATION: MID

## 2021-03-08 ASSESSMENT — PAIN SCALES - GENERAL: PAINLEVEL_OUTOF10: 4

## 2021-03-08 NOTE — PROGRESS NOTES
Physical Therapy    Physical Therapy Treatment    Name: Sienna Wallace  : 1950  MRN: [id number]    Referring Provider:  Mino Clifford MD    Date of Service: 3/8/2021    Evaluating PT:  Alejandro Parra, PT, DPT [id number]      Room #:  7072/3630-B  Diagnosis:  Acute kidney injury  PMHx/PSHx:  Obesity, L bundle branch block, hyperlipidemia, HTN, colostomy, cholecystectomy, hernia repair  Procedure/Surgery:  None this admission  Precautions:  Falls, ostomy x2, tracheostomy  Equipment Needs:  TBD    SUBJECTIVE:  Pt admitted from [address]) where she had been for approx 2 weeks. Pt has been working towards sit<>stand and pivot transfers with assist x2 in therapy. She has been receiving assist to [facility] to w/c. OBJECTIVE:   Initial Evaluation  Date: 3/4/21 Treatment  3/8 Short Term/ Long Term   Goals   AM-PAC 6 Clicks     Was pt agreeable to Eval/treatment? yes Yes     Does pt have pain? No c/o pain Denies pain    Bed Mobility  Rolling: Mod A  Supine to sit: Max A  Sit to supine: Mod A  Scooting: Mod A to EOB, Max A laterally Rolling: ModA  Supine to sit: MaxA  Sit to supine: MaxA  Scooting: MaxA Rolling: Mod Independent   Supine to sit: Mod Independent   Sit to supine: Mod Independent   Scooting: Mod Independent    Transfers Sit to stand: NT, pt declined  Stand to sit: NT  Stand pivot: NT Sit to stand: NT  Stand to sit: NT  Stand pivot: NT Sit to stand: Max A  Stand to sit: Max A  Stand pivot: Max A with AAD   Ambulation    W/C mobility  NT  NT NT TBD  >50 feet with manual w/c with Supervision   Stair negotiation: ascended and descended  NT NT TBD   ROM BUE:  Limited shoulder flexion to approx 90 degrees  BLE:  WNL, except R ankle PF 0/5     Strength BUE:  4-/5  BLE:  4-/5     Balance Sitting EOB:  SBA  Dynamic Standing:  NT Sitting EOB:  SBA  Dynamic Standing:  NT Sitting EOB:  Independent  Dynamic Standing:   Max A with FWW     Pt is A & O x 3  Sensation:  Pt denies numbness and tingling to extremities  Edema:  unremarkable    Patient education  Pt educated on role of therapy, safety with mobility, sitting balance, improved positioning in bed    Patient response to education:   Pt verbalized understanding Pt demonstrated skill Pt requires further education in this area   yes yes yes     ASSESSMENT:    Comments:  Pt resting semi-supine upon arrival, agreeable to PT session  Pt assist levels largely the same as previous session. Pt noted to be incontinent, assisted with rolling to complete hygiene and pad changes. Pt assisting but still needing heavy push/pull to roll. Pt requires significant assist for all transfers and has low level of motivation for mobility at this time. Treatment:  Patient practiced and was instructed in the following treatment:    Bed mobility training - pt given verbal and tactile cues to facilitate proper sequencing and safety during rolling and supine>sit as well as provided with physical assistance to complete task    Sitting EOB for >5 minutes for upright tolerance, postural awareness and BLE ROM   Pt's/ family goals   1. To get stronger    Patient and or family understand(s) diagnosis, prognosis, and plan of care. yes    PLAN OF CARE:    Slow progress, continue PT POC  Time in  0930  Time out  0945    Total Treatment Time  10 minutes     Evaluation Time includes thorough review of current medical information, gathering information on past medical history/social history and prior level of function, completion of standardized testing/informal observation of tasks, assessment of data and education on plan of care and goals.     CPT codes:  [] Low Complexity PT evaluation 05116  [] Moderate Complexity PT evaluation 43753  [] High Complexity PT evaluation 88363  [] PT Re-evaluation 30317  [] Gait training 10454 0 minutes  [] Manual therapy 86103 0 minutes  [x] Therapeutic activities 53685 10 minutes  [] Therapeutic exercises 28210 0 minutes  [] Neuromuscular reeducation 35935 0 minutes     Heidi Polanco, PT, DPT  [id number]

## 2021-03-08 NOTE — PROGRESS NOTES
Department of Internal Medicine  Nephrology Progress Note      Events reviewed. SUBJECTIVE:  We are following Mrs. Banuelos for COLTON, hyponatremia, and hyperkalemia. She reports no complaints.     Physical Exam:    VITALS:  /85   Pulse 102   Temp 97.6 °F (36.4 °C) (Temporal)   Resp 16   Ht 5' 2\" (1.575 m)   Wt 188 lb (85.3 kg)   SpO2 96%   BMI 34.39 kg/m²   24HR INTAKE/OUTPUT:      Intake/Output Summary (Last 24 hours) at 3/8/2021 1253  Last data filed at 3/8/2021 1021  Gross per 24 hour   Intake 240 ml   Output 1550 ml   Net -1310 ml       General Appearance: alert and oriented to person, place and time, well developed and well- nourished, in no acute distress  Skin: warm and dry, no rash or erythema  Head: normocephalic and atraumatic  Eyes: pupils equal, round, and reactive to light, extraocular eye movements intact, conjunctivae normal  ENT: tympanic membrane, external ear and ear canal normal bilaterally, nose without deformity, nasal mucosa and turbinates normal without polyps  Neck: supple and non-tender without mass, no thyromegaly or thyroid nodules, no cervical lymphadenopathy  Pulmonary/Chest: clear to auscultation bilaterally- no wheezes, rales or rhonchi, normal air movement, no respiratory distress  Cardiovascular: normal rate, regular rhythm, normal S1 and S2, no murmurs, rubs, clicks, or gallops,   Abdomen: soft, non-tender, non-distended, normal bowel sounds,  left colostomy bag with watery output, right mucous fistula with greenish mucus  Extremities: no cyanosis, clubbing; minimal pitting edema of bilateral lower extremity     Scheduled Meds:   Loperamide HCl  2 mg Oral TID    polycarbophil  625 mg Oral BID    levoFLOXacin  250 mg Oral Daily    miconazole   Topical BID    insulin lispro  0-6 Units Subcutaneous TID WC    insulin lispro  0-3 Units Subcutaneous Nightly    cholestyramine  1 packet Oral BID    sodium chloride flush  10 mL Intravenous Once    apixaban  5 mg Oral BID  ferrous sulfate  325 mg Oral Daily with breakfast    budesonide  0.25 mg Nebulization BID    And    Arformoterol Tartrate  15 mcg Nebulization BID     Continuous Infusions:   sodium chloride 100 mL/hr at 03/08/21 1038    dextrose       PRN Meds:.glucose, dextrose, glucagon (rDNA), dextrose, albuterol, traMADol    DATA:    CBC:   Lab Results   Component Value Date    WBC 6.5 03/05/2021    RBC 3.43 03/05/2021    HGB 10.0 03/05/2021    HCT 31.3 03/05/2021    MCV 91.3 03/05/2021    MCH 29.2 03/05/2021    MCHC 31.9 03/05/2021    RDW 14.5 03/05/2021     03/05/2021    MPV 8.9 03/05/2021     CMP:    Lab Results   Component Value Date     03/08/2021    K 4.3 03/08/2021    K 6.1 03/02/2021    CL 91 03/08/2021    CO2 29 03/08/2021    BUN 19 03/08/2021    CREATININE 1.2 03/08/2021    GFRAA 54 03/08/2021    AGRATIO 0.7 11/27/2020    LABGLOM 44 03/08/2021    GLUCOSE 119 03/08/2021    PROT 8.9 03/02/2021    LABALBU 2.8 03/08/2021    CALCIUM 10.7 03/08/2021    BILITOT 1.1 03/02/2021    ALKPHOS 101 03/02/2021    AST 51 03/02/2021    ALT 17 03/02/2021     Magnesium:    Lab Results   Component Value Date    MG 1.3 03/08/2021     Phosphorus:    Lab Results   Component Value Date    PHOS 2.6 03/05/2021     Radiology Review:      CT of abdomen and pelvis without IV contrast 3/2/2021   1.  Relatively recent postoperative changes with incomplete closure of the   subcutaneous soft tissues overlying the linea alba of the anterior rectus   abdominal muscle across the midline. 2.  Multiple previous bowel surgery with overall shortening of the bowel. 3.  Presence of a right and left-sided patent ostomy, ileostomy on the right,   jejunostomy on the left. 4.  More late subcapsular hematoma of the right lobe of the liver 7.7 x 2 x   4.5 cm. 5.  Status post cholecystectomy, no dilatation biliary tree pancreatic ductal   system. 6.  No obstructive uropathy.    7.  No acute inflammatory changes in the mid mesentery fat planes, free   intraperitoneal air or ascites or indication for bowel obstruction. CXR 3/2/2021   Faint reticular airspace opacities at both lung bases may be on the basis of   pulmonary edema or atypical/viral pneumonia. BRIEF SUMMARY OF INITIAL CONSULT:    Briefly Isis Madera is 79 y.o. female with history of colon cancer (s/p right hemicolectomy, small bowel enterotomy, repair than resection, creation of loop proximal ileostomy and loop distal ileal mucous fistula; complication with abdominal abscess s/p drainage removal), trach decannulated, hypertension (carvedilol and hydralazine), factor V Leiden with chronic DVT of bilateral lower extremity (Eliquis, plan to switch to 2.5 mg twice daily after April 2), type 2 diabetes mellitus (metformin 1000 mg twice daily), history of COPD, who was admitted on 3/2/2021 from skilled nursing facility for complaints of hypotension and altered mental status. Per patient's daughter her blood pressure was 60 / 20 mmHg in the morning, EMS was called who gave her IV fluids and her blood pressure was 90 x 40 mmHg on arrival to the ED. Patient complained that she is continued to have increased output from her ileostomy over the past couple of days, has been drinking water due to feeling thirsty and chewing on ice cubes. In the ED, initial evaluation showed patient to have hyponatremia with sodium of 123 meq/L, hyperkalemia with potassium of 6.1 MEQ/L, low chloride 82 mmol/L, BUN/creatinine of 50/2.8. Nephrology was consulted for electrolyte abnormality and COLTON. Problems resolved:  Hyperkalemia 2/2 COLTON (decreased GFR), resolved. K levels improved. COLTON stage III, volume responsive prerenal COLTON, 2/2 increased output from colostomy with decreased intake. FeNa 0.4% supports prerenal component. Resolved with IV fluids administration.   Recurrent Hypovolemic hypoosmolar hyponatremia, 2/2 increased solute rich water loss from the colostomy combined with free

## 2021-03-08 NOTE — PLAN OF CARE
Problem: Skin Integrity:  Goal: Will show no infection signs and symptoms  Description: Will show no infection signs and symptoms  Outcome: Met This Shift

## 2021-03-08 NOTE — FLOWSHEET NOTE
Inpatient Wound Care(follow up) 8422    Admit Date: 3/2/2021 12:30 PM    Reason for consult:  Leaking ileostomy    Findings:       03/08/21 0850   Ileostomy Loop ileostomy LLQ   Placement Date: 11/27/20   Pre-existing: No  Ileostomy Type: Loop ileostomy  Location: LLQ   Stomal Appliance 1 piece; Changed  (with convexity)   Stoma  Assessment Flush;Moist   Peristomal Assessment Intact   Treatment Bag change;Stoma powder  (skin care, barrier ring)   Stool Color Brown   Stool Appearance Watery   Output (mL) 300 ml   Wound 03/03/21 Abdomen Mid   Date First Assessed/Time First Assessed: 03/03/21 0900   Present on Hospital Admission: Yes  Primary Wound Type: Surgical Type  Location: Abdomen  Wound Location Orientation: Mid   Dressing Status New dressing applied   Wound Cleansed Cleansed with saline   Dressing/Treatment ABD;Dry dressing;Moist to dry   Wound Assessment Pink/red   Drainage Amount None   Kimmie-wound Assessment Intact     Plan:  Stool leaking into wound  Pouch changed  Discharge soon    Amanda Siddiqui 3/8/2021 10:32 AM

## 2021-03-08 NOTE — PROGRESS NOTES
Chief Complaint:  Chief Complaint   Patient presents with    Altered Mental Status     LETHARGIC/HYPOTENSIVE AT FACILTY     Acute kidney injury ([facility] [address]     Subjective:    She is feeling well today, and no further c/o dysuria    Per RN, ostomy nurse thought the outputs are high. Over last 6 days, review of I/O's shows total of 6.8 L stool. Objective:    /85   Pulse 102   Temp 97.6 °F (36.4 °C) (Temporal)   Resp 16   Ht 5' 2\" (1.575 m)   Wt 188 lb (85.3 kg)   SpO2 96%   BMI 34.39 kg/m²     Current medications that patient is taking have been reviewed. General appearance: NAD, conversant  HEENT: AT/NC, MMM  Neck: FROM, supple  Lungs: Clear to auscultation, WOB normal  CV: RRR, no MRGs  Abdomen: Soft, non-tender; no masses or HSM, +BS. Ostomy output is brown and normal in appearance.   Extremities: No peripheral edema or digital cyanosis  Skin: no rash, lesions or ulcers  Psych: Calm and cooperative  Neuro: Alert and interactive, face symmetric, moving all extremities, speech fluent    Labs:  CBC with Differential:    Lab Results   Component Value Date    WBC 6.5 03/05/2021    RBC 3.43 03/05/2021    HGB 10.0 03/05/2021    HCT 31.3 03/05/2021     03/05/2021    MCV 91.3 03/05/2021    MCH 29.2 03/05/2021    MCHC 31.9 03/05/2021    RDW 14.5 03/05/2021    NRBC 3 11/27/2020    SEGSPCT 87.1 11/26/2020    BANDSPCT 20.0 11/27/2020    METASPCT 3.0 11/27/2020    LYMPHOPCT 42.3 03/05/2021    MONOPCT 11.3 03/05/2021    BASOPCT 0.9 03/05/2021    MONOSABS 0.73 03/05/2021    LYMPHSABS 2.73 03/05/2021    EOSABS 0.13 03/05/2021    BASOSABS 0.06 03/05/2021     CMP:    Lab Results   Component Value Date     03/08/2021    K 4.3 03/08/2021    K 6.1 03/02/2021    CL 91 03/08/2021    CO2 29 03/08/2021    BUN 19 03/08/2021    CREATININE 1.2 03/08/2021    GFRAA 54 03/08/2021    AGRATIO 0.7 11/27/2020    LABGLOM 44 03/08/2021    GLUCOSE 119 03/08/2021    PROT 8.9 03/02/2021    LABALBU 2.8 03/08/2021    CALCIUM 10.7 03/08/2021    BILITOT 1.1 03/02/2021    ALKPHOS 101 03/02/2021    AST 51 03/02/2021    ALT 17 03/02/2021          Assessment/Plan:  Principal Problem:    [facility] ([facility]  Active Problems:    Essential hypertension    Chronic deep vein thrombosis (DVT) of lower extremity (HCC)    Type 2 diabetes mellitus with complication, without long-term current use of insulin (HCC)    Chronic diastolic (congestive) heart failure (HCC)    Factor V deficiency (HCC)    CKD (chronic kidney disease) stage 3, GFR 30-59 ml/min    Hyponatremia    Hyperkalemia    UTI (urinary tract infection)    Hypotension    Prolonged Q-T interval on ECG    Sepsis ([facility] [address]  Resolved Problems:    * No resolved hospital problems. *       Continue ceftriaxone. BP well controlled now, off antihypertensives. Euvolemic in terms of CHF    Continue apixaban for FVL/DVT    In terms of the ostomy output, it has been on average around 1.2 L / day over course of this hospitalization and her renal function had been stable without clinical signs of dehydration. However today seems closer to 2 L output. D/w Ostomy nurse, apparently fluid \"shot out like a fountain\" when she was changing the ostomy appliance. Cr noted to be increasing and sodium decreasing again. Start IV fluids    Start immodium. Increase fiber. Start sandostatin SC. Reconsult surgery.     Requires continued inpatient level of care     Mami Gentile    5:32 PM  3/8/2021

## 2021-03-08 NOTE — PROGRESS NOTES
OT BEDSIDE TREATMENT NOTE      Date:3/8/2021  Patient Name: Han Olmos  MRN: [id number]  : 1950  Room: [facility]     Per OT Eval:   Referring Conrado Razo MD     Evaluating OT: KILO Cortes, OTR/L #[id number]     AM-PAC Daily Activity Raw Score:    AM-PAC Daily Activity Raw Score:   (3/7/21)  Recommended Adaptive Equipment:  TBD as pt progresses      Reason for Admission:  Pt was transferred from SNF w/ lethargy, Hypotension     Diagnosis:     1. Acute kidney injury ([facility] [address]    2. Acute hyperkalemia    3. Acute hyponatremia    4.  Hypotension, unspecified hypotension type       Procedures this admission:  None      Pertinent Medical History:  HTN, Cholecystectomy, Old Trach - currently healing to close per pt, Morbid Obesity, Recent Small Bowel Resection      Precautions:  Falls  Colostomy L LQ  Bustamante Catheter  L Foot Drop     Pt recently had a Small bowel resection - extensive hospitalization, transferred to an LTACH then transferred to a SNF ~ 2 weeks ago.  [address] a Resident of a SNF.     Bathroom setup:  NA - bed-level ADLs   Equipment owned:  ?? - Uses Hospital Bed, [employer], W/C     Prior Level of Function:  Receives assist for all Bed-level ADLs - Old Colostomy.  [facility] staff utilizes a Miryam lift to transfer pt b/t surfaces.  Able to complete Low-Pivot Transfers b/t surfaces w/ assist of 2 in therapy.  Assist for W/C mobility  Driving:  No  Occupation:  None     Pain Level:  Denies pain    Additional Complaints:  None     Vitals/Lab Values:  122/53, Room Air     Cognition: A & O x 4   Able to Follow Multi-Step Commands INDly              Memory:  good  (-)              Sequencing:  good (-)              Problem solving:  good (-)              Judgement/safety:  good (-)  Additional Comments:  Pt was pleasant, cooperative                   Functional Assessment:    Initial Eval Status  Date: 3-4-21 Treatment Status  Date: 3/8/21 Short Term/Long Term Goals  Treatment frequency: PRN 1-3 x/week   1-2 weeks   Feeding IND     Per pt report Independent pt to bring spoon to mouth and take bite  NA   Grooming Set up     Able to complete simple tasks after set up w/ HOB in high carmen position     Min A  Long sitting in bed  SUP  Seated EOB   UB Dressing Mod A     Mod A to don/doff gown in high-carmen position MOD A to lexa/doff hospital gown  Min A  Seated EOB   LB Dressing Dep     Max A of 2 for simulated tasks in supine  Pt ed for safety, adaptive techs/equip  Unable to stand for ax    Dep to lexa/doff socks at bed level  Max A   Bathing NT       MIN A UE bathing bed level;    MAX A LE bathing pt completed sponge bath at bed level requiring assist to bathe B LE below knees, buttocks  Max A   Toileting Dep     Colostomy and Bustamante Catheter  Nsg care    NT   Colostomy  NA   Bed Mobility  Rolling:  Max A  Repositioning:  Max A of 2 toward HOB in supine   Supine to Sit:  NT    Sit to Supine:  NT      Initiated Transfer to [facility] w/ Max A, however, Nsg arrived to room for Nsg care/colostomy care - pt repositioned in High-carmen position     Supine<>Sit: Mod A n/t per last report  Mod A   Functional Transfers Sit to stand:  NT  Stand to sit:  NT         N/t   Max A of 2   Functional Mobility NT          n/t Max A of 2   B/t surfaces only   Balance Sitting:      Static:  Fair(-)    Dynamic:  Fair(-)  Sitting:      Static: SBA per last report           Activity Tolerance Fair(-)  Limited by general weakness/fatigue     Fair-      Visual/  Perceptual WFL  Glasses:  Yes        Hearing WFL  Hearing Aids  No           Comments: Upon arrival pt supine in bed, agreeable to therapy session at bed level. Pt educated with regards to UE/LE dressing, grooming tasks, bathing UE/LE, importance of out of bed activity. At end of session pt supine in bed with HOB slightly elevated,  all lines and tubes intact, call light within reach. · Pt has made fair  progress towards set goals. · Continue with current plan of care      Treatment Time             Treatment Time Out: 5470                Treatment Charges: Mins Units   Ther Ex  03112     Manual Therapy [phone]     Thera Activities [id number]     ADL/Home Mgt [id number]  3   Neuro Re-ed [id number]     Group Therapy      Orthotic manage/training  64510     Non-Billable Time     Total Timed Treatment [address] [place]/L [id number]

## 2021-03-08 NOTE — CARE COORDINATION
Social Work Discharge/Plannin ELIDA spoke with liaison Joao Alexander with [facility] who reports still awaiting auth. Ambulance form and envelope are on softchart. ELIDA/HERNANDEZ to follow.     Pramod Chakraborty, JAMIE  [phone]

## 2021-03-08 NOTE — PROGRESS NOTES
General surgery asked to re-address patient for high output ileostomy and jejunostomy. Total output in medical record over last 24 hours is 1550 mL, however per nurse there was closer to 2 L as there was an amount that sprayed out of the bag. Patient's Cr is also rising, now 1.3. Patient is already on fibercon, cholestyramine, and Sandostatin and loperamide were just started today. Recommend discontinuing Sandostatin. Can increase the cholestyramine and titrate the imodium if we need to further reduce output.      Will defer IV fluids per nephrology

## 2021-03-08 NOTE — PROGRESS NOTES
Called Dr. Levy Signs to notify of high ostomy output and request liquid imodium and increase fiber dose/frequency. Also requested sandostatin order. He will place orders.

## 2021-03-09 PROBLEM — E43 SEVERE PROTEIN-CALORIE MALNUTRITION (HCC): Status: ACTIVE | Noted: 2021-03-09

## 2021-03-09 LAB
ALBUMIN SERPL-MCNC: 2.7 G/DL (ref 3.5–5.2)
ALP BLD-CCNC: 122 U/L (ref 35–104)
ALT SERPL-CCNC: 23 U/L (ref 0–32)
ANION GAP SERPL CALCULATED.3IONS-SCNC: 8 MMOL/L (ref 7–16)
AST SERPL-CCNC: 61 U/L (ref 0–31)
BILIRUB SERPL-MCNC: 0.7 MG/DL (ref 0–1.2)
BUN BLDV-MCNC: 20 MG/DL (ref 8–23)
CALCIUM IONIZED: 1.31 MMOL/L (ref 1.15–1.33)
CALCIUM SERPL-MCNC: 8.7 MG/DL (ref 8.6–10.2)
CHLORIDE BLD-SCNC: 94 MMOL/L (ref 98–107)
CO2: 31 MMOL/L (ref 22–29)
CREAT SERPL-MCNC: 1.2 MG/DL (ref 0.5–1)
GFR AFRICAN AMERICAN: 54
GFR NON-AFRICAN AMERICAN: 44 ML/MIN/1.73
GLUCOSE BLD-MCNC: 129 MG/DL (ref 74–99)
HCT VFR BLD CALC: 32.8 % (ref 34–48)
HEMOGLOBIN: 10.4 G/DL (ref 11.5–15.5)
MCH RBC QN AUTO: 29.3 PG (ref 26–35)
MCHC RBC AUTO-ENTMCNC: 31.7 % (ref 32–34.5)
MCV RBC AUTO: 92.4 FL (ref 80–99.9)
METER GLUCOSE: 109 MG/DL (ref 74–99)
METER GLUCOSE: 141 MG/DL (ref 74–99)
METER GLUCOSE: 192 MG/DL (ref 74–99)
METER GLUCOSE: 215 MG/DL (ref 74–99)
PDW BLD-RTO: 14.3 FL (ref 11.5–15)
PHOSPHORUS: 4.3 MG/DL (ref 2.5–4.5)
PLATELET # BLD: 183 E9/L (ref 130–450)
PMV BLD AUTO: 9 FL (ref 7–12)
POTASSIUM SERPL-SCNC: 3.8 MMOL/L (ref 3.5–5)
RBC # BLD: 3.55 E12/L (ref 3.5–5.5)
SODIUM BLD-SCNC: 133 MMOL/L (ref 132–146)
TOTAL PROTEIN: 6.8 G/DL (ref 6.4–8.3)
WBC # BLD: 6.4 E9/L (ref 4.5–11.5)

## 2021-03-09 PROCEDURE — 82962 GLUCOSE BLOOD TEST: CPT

## 2021-03-09 PROCEDURE — 6370000000 HC RX 637 (ALT 250 FOR IP): Performed by: INTERNAL MEDICINE

## 2021-03-09 PROCEDURE — 82330 ASSAY OF CALCIUM: CPT

## 2021-03-09 PROCEDURE — 85027 COMPLETE CBC AUTOMATED: CPT

## 2021-03-09 PROCEDURE — 36415 COLL VENOUS BLD VENIPUNCTURE: CPT

## 2021-03-09 PROCEDURE — 94640 AIRWAY INHALATION TREATMENT: CPT

## 2021-03-09 PROCEDURE — 6370000000 HC RX 637 (ALT 250 FOR IP): Performed by: STUDENT IN AN ORGANIZED HEALTH CARE EDUCATION/TRAINING PROGRAM

## 2021-03-09 PROCEDURE — 1200000000 HC SEMI PRIVATE

## 2021-03-09 PROCEDURE — 80053 COMPREHEN METABOLIC PANEL: CPT

## 2021-03-09 PROCEDURE — 6360000002 HC RX W HCPCS: Performed by: INTERNAL MEDICINE

## 2021-03-09 PROCEDURE — 2580000003 HC RX 258: Performed by: STUDENT IN AN ORGANIZED HEALTH CARE EDUCATION/TRAINING PROGRAM

## 2021-03-09 PROCEDURE — 6370000000 HC RX 637 (ALT 250 FOR IP): Performed by: SURGERY

## 2021-03-09 PROCEDURE — 84100 ASSAY OF PHOSPHORUS: CPT

## 2021-03-09 RX ORDER — DIPHENOXYLATE HYDROCHLORIDE AND ATROPINE SULFATE 2.5; .025 MG/1; MG/1
2 TABLET ORAL 2 TIMES DAILY
Status: DISCONTINUED | OUTPATIENT
Start: 2021-03-09 | End: 2021-03-09 | Stop reason: SDUPTHER

## 2021-03-09 RX ORDER — CEFDINIR 300 MG/1
300 CAPSULE ORAL EVERY 12 HOURS SCHEDULED
Status: DISCONTINUED | OUTPATIENT
Start: 2021-03-09 | End: 2021-03-09

## 2021-03-09 RX ORDER — DIPHENOXYLATE HCL/ATROPINE 2.5-.025/5
10 LIQUID (ML) ORAL 2 TIMES DAILY
Status: DISCONTINUED | OUTPATIENT
Start: 2021-03-09 | End: 2021-03-09 | Stop reason: ALTCHOICE

## 2021-03-09 RX ORDER — DIPHENOXYLATE HCL/ATROPINE 2.5-.025/5
5 LIQUID (ML) ORAL 2 TIMES DAILY
Status: DISCONTINUED | OUTPATIENT
Start: 2021-03-09 | End: 2021-03-10

## 2021-03-09 RX ADMIN — BUDESONIDE 250 MCG: 0.25 SUSPENSION RESPIRATORY (INHALATION) at 20:11

## 2021-03-09 RX ADMIN — PSYLLIUM HUSK 1 PACKET: 3.4 GRANULE ORAL at 14:00

## 2021-03-09 RX ADMIN — ANTI-FUNGAL POWDER MICONAZOLE NITRATE TALC FREE: 1.42 POWDER TOPICAL at 09:57

## 2021-03-09 RX ADMIN — LEVOFLOXACIN 250 MG: 500 TABLET, FILM COATED ORAL at 09:55

## 2021-03-09 RX ADMIN — APIXABAN 5 MG: 5 TABLET, FILM COATED ORAL at 09:48

## 2021-03-09 RX ADMIN — INSULIN LISPRO 1 UNITS: 100 INJECTION, SOLUTION INTRAVENOUS; SUBCUTANEOUS at 22:36

## 2021-03-09 RX ADMIN — BUDESONIDE 250 MCG: 0.25 SUSPENSION RESPIRATORY (INHALATION) at 08:56

## 2021-03-09 RX ADMIN — DIPHENOXYLATE HYDROCHLORIDE AND ATROPINE SULFATE 2 TABLET: 2.5; .025 TABLET ORAL at 09:52

## 2021-03-09 RX ADMIN — APIXABAN 5 MG: 5 TABLET, FILM COATED ORAL at 22:35

## 2021-03-09 RX ADMIN — SODIUM CHLORIDE: 9 INJECTION, SOLUTION INTRAVENOUS at 04:49

## 2021-03-09 RX ADMIN — INSULIN LISPRO 1 UNITS: 100 INJECTION, SOLUTION INTRAVENOUS; SUBCUTANEOUS at 12:35

## 2021-03-09 RX ADMIN — FERROUS SULFATE TAB 325 MG (65 MG ELEMENTAL FE) 325 MG: 325 (65 FE) TAB at 09:54

## 2021-03-09 RX ADMIN — ARFORMOTEROL TARTRATE 15 MCG: 15 SOLUTION RESPIRATORY (INHALATION) at 08:55

## 2021-03-09 RX ADMIN — ARFORMOTEROL TARTRATE 15 MCG: 15 SOLUTION RESPIRATORY (INHALATION) at 20:10

## 2021-03-09 RX ADMIN — PSYLLIUM HUSK 1 PACKET: 3.4 GRANULE ORAL at 09:58

## 2021-03-09 RX ADMIN — PSYLLIUM HUSK 1 PACKET: 3.4 GRANULE ORAL at 22:35

## 2021-03-09 ASSESSMENT — PAIN SCALES - GENERAL: PAINLEVEL_OUTOF10: 0

## 2021-03-09 NOTE — PROGRESS NOTES
Department of Internal Medicine  Nephrology Progress Note      Events reviewed. SUBJECTIVE:  We are following Mrs. Banuelos for COLTON, hyponatremia, and hyperkalemia. She reports no complaints.     Physical Exam:    VITALS:  BP (!) 111/55   Pulse 89   Temp 98.3 °F (36.8 °C) (Oral)   Resp 18   Ht 5' 2\" (1.575 m)   Wt 188 lb (85.3 kg)   SpO2 96%   BMI 34.39 kg/m²   24HR INTAKE/OUTPUT:      Intake/Output Summary (Last 24 hours) at 3/9/2021 1004  Last data filed at 3/9/2021 0543  Gross per 24 hour   Intake 786.67 ml   Output 1075 ml   Net -288.33 ml       General Appearance: alert and oriented to person, place and time, well developed and well- nourished, in no acute distress  Skin: warm and dry, no rash or erythema  Head: normocephalic and atraumatic  Eyes: pupils equal, round, and reactive to light, extraocular eye movements intact, conjunctivae normal  ENT: tympanic membrane, external ear and ear canal normal bilaterally, nose without deformity, nasal mucosa and turbinates normal without polyps  Neck: supple and non-tender without mass, no thyromegaly or thyroid nodules, no cervical lymphadenopathy  Pulmonary/Chest: clear to auscultation bilaterally- no wheezes, rales or rhonchi, normal air movement, no respiratory distress  Cardiovascular: normal rate, regular rhythm, normal S1 and S2, no murmurs, rubs, clicks, or gallops,   Abdomen: soft, non-tender, non-distended, normal bowel sounds,  left colostomy bag with watery output, right mucous fistula with greenish mucus  Extremities: no cyanosis, clubbing; minimal pitting edema of bilateral lower extremity     Scheduled Meds:   psyllium  1 packet Oral TID    diphenoxylate-atropine  2 tablet Oral BID    levoFLOXacin  250 mg Oral Daily    miconazole   Topical BID    insulin lispro  0-6 Units Subcutaneous TID WC    insulin lispro  0-3 Units Subcutaneous Nightly    cholestyramine  1 packet Oral BID    sodium chloride flush  10 mL Intravenous Once    apixaban 5 mg Oral BID    ferrous sulfate  325 mg Oral Daily with breakfast    budesonide  0.25 mg Nebulization BID    And    Arformoterol Tartrate  15 mcg Nebulization BID     Continuous Infusions:   dextrose       PRN Meds:.glucose, dextrose, glucagon (rDNA), dextrose, albuterol, traMADol    DATA:    CBC:   Lab Results   Component Value Date    WBC 6.4 03/09/2021    RBC 3.55 03/09/2021    HGB 10.4 03/09/2021    HCT 32.8 03/09/2021    MCV 92.4 03/09/2021    MCH 29.3 03/09/2021    MCHC 31.7 03/09/2021    RDW 14.3 03/09/2021     03/09/2021    MPV 9.0 03/09/2021     CMP:    Lab Results   Component Value Date     03/09/2021    K 3.8 03/09/2021    K 6.1 03/02/2021    CL 94 03/09/2021    CO2 31 03/09/2021    BUN 20 03/09/2021    CREATININE 1.2 03/09/2021    GFRAA 54 03/09/2021    AGRATIO 0.7 11/27/2020    LABGLOM 44 03/09/2021    GLUCOSE 129 03/09/2021    PROT 6.8 03/09/2021    LABALBU 2.7 03/09/2021    CALCIUM 8.7 03/09/2021    BILITOT 0.7 03/09/2021    ALKPHOS 122 03/09/2021    AST 61 03/09/2021    ALT 23 03/09/2021     Magnesium:    Lab Results   Component Value Date    MG 1.3 03/08/2021     Phosphorus:    Lab Results   Component Value Date    PHOS 4.3 03/09/2021     Radiology Review:      CT of abdomen and pelvis without IV contrast 3/2/2021   1.  Relatively recent postoperative changes with incomplete closure of the   subcutaneous soft tissues overlying the linea alba of the anterior rectus   abdominal muscle across the midline. 2.  Multiple previous bowel surgery with overall shortening of the bowel. 3.  Presence of a right and left-sided patent ostomy, ileostomy on the right,   jejunostomy on the left. 4.  More late subcapsular hematoma of the right lobe of the liver 7.7 x 2 x   4.5 cm. 5.  Status post cholecystectomy, no dilatation biliary tree pancreatic ductal   system. 6.  No obstructive uropathy.    7.  No acute inflammatory changes in the mid mesentery fat planes, free   intraperitoneal air or ascites or indication for bowel obstruction. CXR 3/2/2021   Faint reticular airspace opacities at both lung bases may be on the basis of   pulmonary edema or atypical/viral pneumonia. BRIEF SUMMARY OF INITIAL CONSULT:    Shelli Wallace is 79 y.o. female with history of colon cancer (s/p right hemicolectomy, small bowel enterotomy, repair than resection, creation of loop proximal ileostomy and loop distal ileal mucous fistula; complication with abdominal abscess s/p drainage removal), trach decannulated, hypertension (carvedilol and hydralazine), factor V Leiden with chronic DVT of bilateral lower extremity (Eliquis, plan to switch to 2.5 mg twice daily after April 2), type 2 diabetes mellitus (metformin 1000 mg twice daily), history of COPD, who was admitted on 3/2/2021 from skilled nursing facility for complaints of hypotension and altered mental status. Per patient's daughter her blood pressure was 60 / 20 mmHg in the morning, EMS was called who gave her IV fluids and her blood pressure was 90 x 40 mmHg on arrival to the ED. Patient complained that she is continued to have increased output from her ileostomy over the past couple of days, has been drinking water due to feeling thirsty and chewing on ice cubes. In the ED, initial evaluation showed patient to have hyponatremia with sodium of 123 meq/L, hyperkalemia with potassium of 6.1 MEQ/L, low chloride 82 mmol/L, BUN/creatinine of 50/2.8. Nephrology was consulted for electrolyte abnormality and COLTON. Problems resolved:  Hyperkalemia 2/2 COLTON (decreased GFR), resolved. K levels improved. COLTON stage III, volume responsive prerenal COLTON, 2/2 increased output from colostomy with decreased intake. FeNa 0.4% supports prerenal component. Resolved with IV fluids administration. Recurrent Hypovolemic hypoosmolar hyponatremia, 2/2 increased solute rich water loss from the colostomy combined with free water intake, and decreased GFR. Resolved with IV fluids administration. Hypokalemia, secondary to increased GI losses from colostomy, resolved    IMPRESSION/RECOMMENDATIONS:      Recurrent COLTON stage I, volume responsive prerenal COLTON, 2/2 increased output from colostomy. Renal function improved with IV fluids administration.     HTN, holding BP medications  Hypercalcemia, ionized calcium 1.39 with suppressed PTH (PTH: 9), due to calcium supplementation, levels improved  ---------------------------------------------  UTI, on levofloxacin  Hx of colon cancer s/p hemicolectomy, left-sided colostomy and right-sided mucous fistula. Hypercoagulable state with factor V Leiden, on apixaban.   Normocytic anemia, likely secondary to subcapsular hematoma of right lobe of liver per CT scan    Plan:    Discontinue IV fluids  Monitor renal function      Electronically signed by Go Vasquez MD on 3/9/2021 at 10:04 AM

## 2021-03-09 NOTE — PROGRESS NOTES
Chief Complaint:  Chief Complaint   Patient presents with    Altered Mental Status     LETHARGIC/HYPOTENSIVE AT FACILTY     Acute kidney injury ([facility] [address]     Subjective:    She is feeling well today, and no further c/o dysuria    Approximately 1400 cc of stool documented from the ostomy yesterday    Objective:    BP (!) 111/55   Pulse 89   Temp 98.3 °F (36.8 °C) (Oral)   Resp 18   Ht 5' 2\" (1.575 m)   Wt 188 lb (85.3 kg)   SpO2 96%   BMI 34.39 kg/m²     Current medications that patient is taking have been reviewed. General appearance: NAD, conversant  HEENT: AT/NC, MMM  Neck: FROM, supple  Lungs: Clear to auscultation, WOB normal  CV: RRR, no MRGs  Abdomen: Soft, non-tender; no masses or HSM, +BS. Ostomy output is brown and normal in appearance.   It is liquid with chunks in it  Extremities: No peripheral edema or digital cyanosis  Skin: no rash, lesions or ulcers  Psych: Calm and cooperative  Neuro: Alert and interactive, face symmetric, moving all extremities, speech fluent    Labs:  CBC with Differential:    Lab Results   Component Value Date    WBC 6.4 03/09/2021    RBC 3.55 03/09/2021    HGB 10.4 03/09/2021    HCT 32.8 03/09/2021     03/09/2021    MCV 92.4 03/09/2021    MCH 29.3 03/09/2021    MCHC 31.7 03/09/2021    RDW 14.3 03/09/2021    NRBC 3 11/27/2020    SEGSPCT 87.1 11/26/2020    BANDSPCT 20.0 11/27/2020    METASPCT 3.0 11/27/2020    LYMPHOPCT 42.3 03/05/2021    MONOPCT 11.3 03/05/2021    BASOPCT 0.9 03/05/2021    MONOSABS 0.73 03/05/2021    LYMPHSABS 2.73 03/05/2021    EOSABS 0.13 03/05/2021    BASOSABS 0.06 03/05/2021     CMP:    Lab Results   Component Value Date     03/09/2021    K 3.8 03/09/2021    K 6.1 03/02/2021    CL 94 03/09/2021    CO2 31 03/09/2021    BUN 20 03/09/2021    CREATININE 1.2 03/09/2021    GFRAA 54 03/09/2021    AGRATIO 0.7 11/27/2020    LABGLOM 44 03/09/2021    GLUCOSE 129 03/09/2021    PROT 6.8 03/09/2021    LABALBU 2.7 03/09/2021    CALCIUM 8.7 03/09/2021 BILITOT 0.7 03/09/2021    ALKPHOS 122 03/09/2021    AST 61 03/09/2021    ALT 23 03/09/2021          Assessment/Plan:  Principal Problem:    Acute kidney injury ([facility]  Active Problems:    Essential hypertension    Chronic deep vein thrombosis (DVT) of lower extremity (HCC)    Type 2 diabetes mellitus with complication, without long-term current use of insulin (HCC)    Chronic diastolic (congestive) heart failure (HCC)    Factor V deficiency (HCC)    CKD (chronic kidney disease) stage 3, GFR 30-59 ml/min    Hyponatremia    Hyperkalemia    UTI (urinary tract infection)    Hypotension    Prolonged Q-T interval on ECG    Sepsis ([facility] [address]  Resolved Problems:    * No resolved hospital problems. *       It looks like covering physician over the weekend actually switch the ceftriaxone to levofloxacin. She has now received 6 days of antibiotics. Will discontinue. BP well controlled now, off antihypertensives. Euvolemic in terms of CHF    Continue apixaban for FVL/DVT    I am not really seeing a huge amount of ostomy output. Agree with discontinuation of Sandostatin. Not sure what caused the slight uptrend in her creatinine and downtrending sodium yesterday, it is still possible she was not drinking enough to keep up with 1.4 L output. I advised that she try and push fluids to get about 2 L of fluids per day    Discontinue IV fluids. Will monitor next 24 hours and recheck labs tomorrow, see if she can maintain her own hydration status with p.o. input. Discussed with nephrology.     Continue antimotility and bulking agents    Requires continued inpatient level of care     Jaz Zhu    12:49 PM  3/9/2021

## 2021-03-09 NOTE — CARE COORDINATION
Social Work Discharge/Planning:    Per prior Internal Med note, Discontinue IV fluids. Will monitor next 24 hours and recheck labs tomorrow, see if she can maintain her own hydration status with p.o. input. Discussed with nephrology. [address] spoke with miguel Jiménez with [facility] who reports still awaiting auth, hopeful to get auth later on today. Ambulance form and envelope are on softchart. ELIDA/HERNANDEZ to follow. [facility] spoke with miguel Jiménez who reports obtaining auth for patient.     Remigio Link, JAMIE  [phone]

## 2021-03-09 NOTE — PROGRESS NOTES
Comprehensive Nutrition Assessment    Type and Reason for Visit:  Initial    Nutrition Recommendations/Plan: Continue current diet, Start Ensure HP TID    Nutrition Assessment:  Pt nutritionally compromised w/ decreased PO intake w/ recent complex hospital stay w/ large wt loss. Noted stage II pressure injury and nonhealing surgical wound. Will provide ONS and monitor    Malnutrition Assessment:  Malnutrition Status:  Severe malnutrition    Context:  Chronic Illness     Findings of the 6 clinical characteristics of malnutrition:  Energy Intake:  7 - 75% or less estimated energy requirements for 1 month or longer  Weight Loss:  7 - Greater than 20% over 1 year     Body Fat Loss:  Unable to assess     Muscle Mass Loss:  Unable to assess    Fluid Accumulation:  No significant fluid accumulation     Strength:  Not Performed    Estimated Daily Nutrient Needs:  Energy (kcal):  ; Weight Used for Energy Requirements:  Current     Protein (g):  ; Weight Used for Protein Requirements:  Ideal(1.8-2.0)        Fluid (ml/day):  ; Method Used for Fluid Requirements:  1 ml/kcal      Nutrition Related Findings:  pt alert, abd tenderness, active BS, ileostomy LLQ w/ high OP, mucous fistula RLQ, trace edema, -I/Os      Wounds:  Multiple, Pressure Injury, Stage II, Surgical Incision       Current Nutrition Therapies:    DIET GENERAL; Low Potassium    Anthropometric Measures:  · Height: 5' 2\" (157.5 cm)  · Current Body Weight: 188 lb (85.3 kg)(no method, noted wt 198# @ [facility] 02/2021)   · Usual Body Weight: 248 lb (112.5 kg)(actual per EMR 10/2020)     · Ideal Body Weight: 110 lbs; % Ideal Body Weight 170.9 %   · BMI: 34.4  · BMI Categories: Obese Class 1 (BMI 30.0-34. 9)       Nutrition Diagnosis:   · Severe malnutrition, In context of chronic, non-illness related related to inadequate protein-energy intake(2/2 recent prolonged hospitalization) as evidenced by intake 51-75%, poor intake prior to admission, weight loss greater than or equal to 20% in 1 year      Nutrition Interventions:   Food and/or Nutrient Delivery:  Continue Current Diet, Start Oral Nutrition Supplement(Ensure HP TID)  Nutrition Education/Counseling:  Education not indicated   Coordination of Nutrition Care:  Continue to monitor while inpatient    Goals:  Consume >75% meals/ONS       Nutrition Monitoring and Evaluation:   Food/Nutrient Intake Outcomes:  Diet Advancement/Tolerance, Food and Nutrient Intake, Supplement Intake  Physical Signs/Symptoms Outcomes:  Biochemical Data, GI Status, Fluid Status or Edema, Nutrition Focused Physical Findings, Skin, Weight     Discharge Planning:     Too soon to determine     Electronically signed by Douglas Taylor MS, RD, LD on 3/9/21 at 3:17 PM EST    Contact: [phone]

## 2021-03-09 NOTE — FLOWSHEET NOTE
ET Nurse(follow up) [id number]  Admit Date: 3/2/2021 12:30 PM    Reason for consult:  ileostomy and mucous fistula    Stoma assessment:      03/09/21 1050   Ileostomy Loop ileostomy LLQ   Placement Date: 11/27/20   Pre-existing: No  Ileostomy Type: Loop ileostomy  Location: LLQ   Stomal Appliance 1 piece; Changed  (with convexity and ring)   Stoma  Assessment Flush;Moist;Red   Peristomal Assessment Intact   Treatment Bag change  (skin care)   Stool Color Brown   Stool Appearance Watery   Output (mL) 400 ml   Mucous Fistula RLQ   No Placement Date or Time found. Pre-existing: Yes  Location: RLQ   Stomal Appliance 1 piece   Stoma  Assessment Protrudes; Moist   Peristomal Assessment Intact   Treatment Bag change  (skin care)   Stool Appearance Loose   Stool Color Brown   Output (mL) 100 ml   pouch applied yesterday remained intact     Plan:    Lesson completed with daughters requested  Discharge to facility first and then home per daughter  Orders reviewed and updated    Lui Chapman 3/9/2021 11:17 AM

## 2021-03-10 LAB
ANION GAP SERPL CALCULATED.3IONS-SCNC: 14 MMOL/L (ref 7–16)
ANION GAP SERPL CALCULATED.3IONS-SCNC: 14 MMOL/L (ref 7–16)
ANION GAP SERPL CALCULATED.3IONS-SCNC: 9 MMOL/L (ref 7–16)
BUN BLDV-MCNC: 17 MG/DL (ref 8–23)
BUN BLDV-MCNC: 17 MG/DL (ref 8–23)
BUN BLDV-MCNC: 18 MG/DL (ref 8–23)
CALCIUM SERPL-MCNC: 9.3 MG/DL (ref 8.6–10.2)
CALCIUM SERPL-MCNC: 9.6 MG/DL (ref 8.6–10.2)
CALCIUM SERPL-MCNC: 9.7 MG/DL (ref 8.6–10.2)
CHLORIDE BLD-SCNC: 92 MMOL/L (ref 98–107)
CHLORIDE BLD-SCNC: 93 MMOL/L (ref 98–107)
CHLORIDE BLD-SCNC: 93 MMOL/L (ref 98–107)
CO2: 25 MMOL/L (ref 22–29)
CO2: 25 MMOL/L (ref 22–29)
CO2: 26 MMOL/L (ref 22–29)
CREAT SERPL-MCNC: 1 MG/DL (ref 0.5–1)
CREAT SERPL-MCNC: 1.1 MG/DL (ref 0.5–1)
CREAT SERPL-MCNC: 1.1 MG/DL (ref 0.5–1)
GFR AFRICAN AMERICAN: 59
GFR AFRICAN AMERICAN: 59
GFR AFRICAN AMERICAN: >60
GFR NON-AFRICAN AMERICAN: 49 ML/MIN/1.73
GFR NON-AFRICAN AMERICAN: 49 ML/MIN/1.73
GFR NON-AFRICAN AMERICAN: 55 ML/MIN/1.73
GLUCOSE BLD-MCNC: 105 MG/DL (ref 74–99)
GLUCOSE BLD-MCNC: 201 MG/DL (ref 74–99)
GLUCOSE BLD-MCNC: 236 MG/DL (ref 74–99)
METER GLUCOSE: 126 MG/DL (ref 74–99)
METER GLUCOSE: 190 MG/DL (ref 74–99)
METER GLUCOSE: 198 MG/DL (ref 74–99)
METER GLUCOSE: 236 MG/DL (ref 74–99)
POTASSIUM SERPL-SCNC: 3.7 MMOL/L (ref 3.5–5)
POTASSIUM SERPL-SCNC: 3.9 MMOL/L (ref 3.5–5)
POTASSIUM SERPL-SCNC: 4.7 MMOL/L (ref 3.5–5)
SARS-COV-2, NAAT: NOT DETECTED
SODIUM BLD-SCNC: 128 MMOL/L (ref 132–146)
SODIUM BLD-SCNC: 131 MMOL/L (ref 132–146)
SODIUM BLD-SCNC: 132 MMOL/L (ref 132–146)

## 2021-03-10 PROCEDURE — 87635 SARS-COV-2 COVID-19 AMP PRB: CPT

## 2021-03-10 PROCEDURE — 1200000000 HC SEMI PRIVATE

## 2021-03-10 PROCEDURE — 6370000000 HC RX 637 (ALT 250 FOR IP): Performed by: STUDENT IN AN ORGANIZED HEALTH CARE EDUCATION/TRAINING PROGRAM

## 2021-03-10 PROCEDURE — 94640 AIRWAY INHALATION TREATMENT: CPT

## 2021-03-10 PROCEDURE — 6370000000 HC RX 637 (ALT 250 FOR IP): Performed by: INTERNAL MEDICINE

## 2021-03-10 PROCEDURE — 36415 COLL VENOUS BLD VENIPUNCTURE: CPT

## 2021-03-10 PROCEDURE — 6360000002 HC RX W HCPCS: Performed by: INTERNAL MEDICINE

## 2021-03-10 PROCEDURE — 80048 BASIC METABOLIC PNL TOTAL CA: CPT

## 2021-03-10 PROCEDURE — 82962 GLUCOSE BLOOD TEST: CPT

## 2021-03-10 RX ORDER — SODIUM CHLORIDE 9 MG/ML
INJECTION, SOLUTION INTRAVENOUS CONTINUOUS
Status: DISCONTINUED | OUTPATIENT
Start: 2021-03-10 | End: 2021-03-10

## 2021-03-10 RX ORDER — DIPHENOXYLATE HCL/ATROPINE 2.5-.025/5
10 LIQUID (ML) ORAL 2 TIMES DAILY
Status: DISCONTINUED | OUTPATIENT
Start: 2021-03-10 | End: 2021-03-11 | Stop reason: HOSPADM

## 2021-03-10 RX ADMIN — ANTI-FUNGAL POWDER MICONAZOLE NITRATE TALC FREE: 1.42 POWDER TOPICAL at 09:47

## 2021-03-10 RX ADMIN — PSYLLIUM HUSK 2 PACKET: 3.4 GRANULE ORAL at 21:51

## 2021-03-10 RX ADMIN — INSULIN LISPRO 1 UNITS: 100 INJECTION, SOLUTION INTRAVENOUS; SUBCUTANEOUS at 21:53

## 2021-03-10 RX ADMIN — DIPHENOXYLATE HYDROCHLORIDE AND ATROPINE SULFATE 10 ML: 2.5; .025 SOLUTION ORAL at 21:53

## 2021-03-10 RX ADMIN — CHOLESTYRAMINE 4 G: 4 POWDER, FOR SUSPENSION ORAL at 21:51

## 2021-03-10 RX ADMIN — PSYLLIUM HUSK 2 PACKET: 3.4 GRANULE ORAL at 13:20

## 2021-03-10 RX ADMIN — ANTI-FUNGAL POWDER MICONAZOLE NITRATE TALC FREE: 1.42 POWDER TOPICAL at 21:53

## 2021-03-10 RX ADMIN — ARFORMOTEROL TARTRATE 15 MCG: 15 SOLUTION RESPIRATORY (INHALATION) at 08:12

## 2021-03-10 RX ADMIN — APIXABAN 5 MG: 5 TABLET, FILM COATED ORAL at 09:40

## 2021-03-10 RX ADMIN — ARFORMOTEROL TARTRATE 15 MCG: 15 SOLUTION RESPIRATORY (INHALATION) at 20:08

## 2021-03-10 RX ADMIN — BUDESONIDE 250 MCG: 0.25 SUSPENSION RESPIRATORY (INHALATION) at 08:12

## 2021-03-10 RX ADMIN — INSULIN LISPRO 1 UNITS: 100 INJECTION, SOLUTION INTRAVENOUS; SUBCUTANEOUS at 13:20

## 2021-03-10 RX ADMIN — DIPHENOXYLATE HYDROCHLORIDE AND ATROPINE SULFATE 10 ML: 2.5; .025 SOLUTION ORAL at 09:41

## 2021-03-10 RX ADMIN — INSULIN LISPRO 2 UNITS: 100 INJECTION, SOLUTION INTRAVENOUS; SUBCUTANEOUS at 16:19

## 2021-03-10 RX ADMIN — BUDESONIDE 250 MCG: 0.25 SUSPENSION RESPIRATORY (INHALATION) at 20:08

## 2021-03-10 RX ADMIN — APIXABAN 5 MG: 5 TABLET, FILM COATED ORAL at 21:51

## 2021-03-10 RX ADMIN — PSYLLIUM HUSK 2 PACKET: 3.4 GRANULE ORAL at 09:43

## 2021-03-10 RX ADMIN — FERROUS SULFATE TAB 325 MG (65 MG ELEMENTAL FE) 325 MG: 325 (65 FE) TAB at 09:39

## 2021-03-10 ASSESSMENT — PAIN SCALES - GENERAL: PAINLEVEL_OUTOF10: 0

## 2021-03-10 NOTE — PROGRESS NOTES
GENERAL SURGERY  DAILY PROGRESS NOTE  3/10/2021    Chief Complaint   Patient presents with    Altered Mental Status     LETHARGIC/HYPOTENSIVE AT FACILTY       Subjective:  Feels alright this AM. Not in abdominal pain    Objective:  /72   Pulse 87   Temp 99.2 °F (37.3 °C) (Temporal)   Resp 16   Ht 5' 2\" (1.575 m)   Wt 188 lb (85.3 kg)   SpO2 97%   BMI 34.39 kg/m²     GENERAL:  Laying in bed, awake, alert, cooperative, no apparent distress  LUNGS:  No increased work of breathing  CARDIOVASCULAR:  RR  ABDOMEN:  Soft, non-tender, non-distended, midline incision CDI, LLQ stoma with slightly thick stoma output, green  EXTREMITIES: No edema or swelling  SKIN: Warm and dry    Assessment/Plan:  79 y.o. female with high ileostomy output, COLTON    Ostomy with 2,150 out in last 24 hours  LIYAH  Continue questran 4g BID, changed to lomotil 10 mL BID, changed to psyllium 2 packet TID    Electronically signed by Kathy Ramos DO on 3/10/2021 at 6:45 AM

## 2021-03-10 NOTE — PROGRESS NOTES
Chief Complaint:  Chief Complaint   Patient presents with    Altered Mental Status     LETHARGIC/HYPOTENSIVE AT FACILTY     Acute kidney injury ([address]     Subjective:    She is feeling well today, and no further c/o dysuria     2 L ostomy output yesterday    Objective:    /70   Pulse 97   Temp 97.7 °F (36.5 °C) (Temporal)   Resp 16   Ht 5' 2\" (1.575 m)   Wt 188 lb (85.3 kg)   SpO2 96%   BMI 34.39 kg/m²     Current medications that patient is taking have been reviewed. General appearance: NAD, conversant  HEENT: AT/NC, MMM  Neck: FROM, supple  Lungs: Clear to auscultation, WOB normal  CV: RRR, no MRGs  Abdomen: Soft, non-tender; no masses or HSM, +BS. Ostomy output is brown and normal in appearance.   It is liquid with chunks in it  Extremities: No peripheral edema or digital cyanosis  Skin: no rash, lesions or ulcers  Psych: Calm and cooperative  Neuro: Alert and interactive, face symmetric, moving all extremities, speech fluent    Labs:  CBC with Differential:    Lab Results   Component Value Date    WBC 6.4 03/09/2021    RBC 3.55 03/09/2021    HGB 10.4 03/09/2021    HCT 32.8 03/09/2021     03/09/2021    MCV 92.4 03/09/2021    MCH 29.3 03/09/2021    MCHC 31.7 03/09/2021    RDW 14.3 03/09/2021    NRBC 3 11/27/2020    SEGSPCT 87.1 11/26/2020    BANDSPCT 20.0 11/27/2020    METASPCT 3.0 11/27/2020    LYMPHOPCT 42.3 03/05/2021    MONOPCT 11.3 03/05/2021    BASOPCT 0.9 03/05/2021    MONOSABS 0.73 03/05/2021    LYMPHSABS 2.73 03/05/2021    EOSABS 0.13 03/05/2021    BASOSABS 0.06 03/05/2021     CMP:    Lab Results   Component Value Date     03/10/2021    K 3.7 03/10/2021    K 6.1 03/02/2021    CL 93 03/10/2021    CO2 25 03/10/2021    BUN 17 03/10/2021    CREATININE 1.1 03/10/2021    GFRAA 59 03/10/2021    AGRATIO 0.7 11/27/2020    LABGLOM 49 03/10/2021    GLUCOSE 236 03/10/2021    PROT 6.8 03/09/2021    LABALBU 2.7 03/09/2021    CALCIUM 9.7 03/10/2021    BILITOT 0.7 03/09/2021    ALKPHOS 122

## 2021-03-10 NOTE — PLAN OF CARE
Problem: Falls - Risk of:  Goal: Will remain free from falls  Description: Will remain free from falls  3/10/2021 0400 by Eloise Joshi  Outcome: Met This Shift  3/9/2021 1521 by Jairo Barrios RN  Outcome: Met This Shift  Goal: Absence of physical injury  Description: Absence of physical injury  Outcome: Met This Shift     Problem: Malnutrition  (NI-5.2)  Goal: Food and/or Nutrient Delivery  Description: Individualized approach for food/nutrient provision.   3/9/2021 1517 by Gussie Leyden, MS, RD, LD  Outcome: Met This Shift

## 2021-03-10 NOTE — CARE COORDINATION
Social Work Discharge/Plannin SW spoke with patient's daughter to notify SW obtained precert for patient. SW will notify patient's daughter once patient has a discharge order. Per daughter, plan remains for patient to discharge to [facility]. ELIDA/HERNANDEZ to follow.     Inga Sierra, JAMIE  [phone]

## 2021-03-10 NOTE — PROGRESS NOTES
Intravenous Once    apixaban  5 mg Oral BID    ferrous sulfate  325 mg Oral Daily with breakfast    budesonide  0.25 mg Nebulization BID    And    Arformoterol Tartrate  15 mcg Nebulization BID     Continuous Infusions:   dextrose       PRN Meds:.glucose, dextrose, glucagon (rDNA), dextrose, albuterol, traMADol    DATA:    CBC:   Lab Results   Component Value Date    WBC 6.4 03/09/2021    RBC 3.55 03/09/2021    HGB 10.4 03/09/2021    HCT 32.8 03/09/2021    MCV 92.4 03/09/2021    MCH 29.3 03/09/2021    MCHC 31.7 03/09/2021    RDW 14.3 03/09/2021     03/09/2021    MPV 9.0 03/09/2021     CMP:    Lab Results   Component Value Date     03/09/2021    K 3.8 03/09/2021    K 6.1 03/02/2021    CL 94 03/09/2021    CO2 31 03/09/2021    BUN 20 03/09/2021    CREATININE 1.2 03/09/2021    GFRAA 54 03/09/2021    AGRATIO 0.7 11/27/2020    LABGLOM 44 03/09/2021    GLUCOSE 129 03/09/2021    PROT 6.8 03/09/2021    LABALBU 2.7 03/09/2021    CALCIUM 8.7 03/09/2021    BILITOT 0.7 03/09/2021    ALKPHOS 122 03/09/2021    AST 61 03/09/2021    ALT 23 03/09/2021     Magnesium:    Lab Results   Component Value Date    MG 1.3 03/08/2021     Phosphorus:    Lab Results   Component Value Date    PHOS 4.3 03/09/2021     Radiology Review:      CT of abdomen and pelvis without IV contrast 3/2/2021   1.  Relatively recent postoperative changes with incomplete closure of the   subcutaneous soft tissues overlying the linea alba of the anterior rectus   abdominal muscle across the midline. 2.  Multiple previous bowel surgery with overall shortening of the bowel. 3.  Presence of a right and left-sided patent ostomy, ileostomy on the right,   jejunostomy on the left. 4.  More late subcapsular hematoma of the right lobe of the liver 7.7 x 2 x   4.5 cm. 5.  Status post cholecystectomy, no dilatation biliary tree pancreatic ductal   system. 6.  No obstructive uropathy.    7.  No acute inflammatory changes in the mid mesentery fat planes, free   intraperitoneal air or ascites or indication for bowel obstruction. CXR 3/2/2021   Faint reticular airspace opacities at both lung bases may be on the basis of   pulmonary edema or atypical/viral pneumonia. BRIEF SUMMARY OF INITIAL CONSULT:    Briefly Usha Velasco is 79 y.o. female with history of colon cancer (s/p right hemicolectomy, small bowel enterotomy, repair than resection, creation of loop proximal ileostomy and loop distal ileal mucous fistula; complication with abdominal abscess s/p drainage removal), trach decannulated, hypertension (carvedilol and hydralazine), factor V Leiden with chronic DVT of bilateral lower extremity (Eliquis, plan to switch to 2.5 mg twice daily after April 2), type 2 diabetes mellitus (metformin 1000 mg twice daily), history of COPD, who was admitted on 3/2/2021 from skilled nursing facility for complaints of hypotension and altered mental status. Per patient's daughter her blood pressure was 60 / 20 mmHg in the morning, EMS was called who gave her IV fluids and her blood pressure was 90 x 40 mmHg on arrival to the ED. Patient complained that she is continued to have increased output from her ileostomy over the past couple of days, has been drinking water due to feeling thirsty and chewing on ice cubes. In the ED, initial evaluation showed patient to have hyponatremia with sodium of 123 meq/L, hyperkalemia with potassium of 6.1 MEQ/L, low chloride 82 mmol/L, BUN/creatinine of 50/2.8. Nephrology was consulted for electrolyte abnormality and COLTON. Problems resolved:  Hyperkalemia 2/2 COLTON (decreased GFR), resolved. K levels improved. COLTON stage III, volume responsive prerenal COLTON, 2/2 increased output from colostomy with decreased intake. FeNa 0.4% supports prerenal component. Resolved with IV fluids administration.   Recurrent Hypovolemic hypoosmolar hyponatremia, 2/2 increased solute rich water loss from the colostomy combined with free water intake, and decreased GFR. Resolved with IV fluids administration. Hypokalemia, secondary to increased GI losses from colostomy, resolved  Hypercalcemia, ionized calcium 1.39 with suppressed PTH (PTH: 9), due to calcium supplementation, levels improved    IMPRESSION/RECOMMENDATIONS:      Recurrent COLTON stage I, volume responsive prerenal COLTON, 2/2 increased output from colostomy. Renal function improved with IV fluids administration. Recurrent hypovolemic hyponatremia, secondary to increased output from colostomy. Moderated colostomy output; On psyllium, Lomotil, cholestyramine, and fiber to decrease. Will restart on fluids. HTN, holding BP medications  ---------------------------------------------  UTI, on levofloxacin  Hx of colon cancer s/p hemicolectomy, left-sided colostomy and right-sided mucous fistula. Hypercoagulable state with factor V Leiden, on apixaban.   Normocytic anemia, likely secondary to subcapsular hematoma of right lobe of liver per CT scan    Plan:    NS at 125 cc/hr x 1L  Monitor sodium levels  Obtain magnesium level in AM  Continue to monitor renal function          Electronically signed by ERNESTINE Hunt CNP on 3/10/2021 at 9:08 AM

## 2021-03-11 VITALS
HEIGHT: 62 IN | WEIGHT: 188 LBS | HEART RATE: 88 BPM | BODY MASS INDEX: 34.6 KG/M2 | SYSTOLIC BLOOD PRESSURE: 126 MMHG | OXYGEN SATURATION: 98 % | DIASTOLIC BLOOD PRESSURE: 76 MMHG | RESPIRATION RATE: 17 BRPM | TEMPERATURE: 97.4 F

## 2021-03-11 LAB
ANION GAP SERPL CALCULATED.3IONS-SCNC: 7 MMOL/L (ref 7–16)
BUN BLDV-MCNC: 19 MG/DL (ref 8–23)
CALCIUM SERPL-MCNC: 9.7 MG/DL (ref 8.6–10.2)
CHLORIDE BLD-SCNC: 93 MMOL/L (ref 98–107)
CO2: 34 MMOL/L (ref 22–29)
CREAT SERPL-MCNC: 1.1 MG/DL (ref 0.5–1)
GFR AFRICAN AMERICAN: 59
GFR NON-AFRICAN AMERICAN: 49 ML/MIN/1.73
GLUCOSE BLD-MCNC: 118 MG/DL (ref 74–99)
MAGNESIUM: 1.5 MG/DL (ref 1.6–2.6)
METER GLUCOSE: 111 MG/DL (ref 74–99)
METER GLUCOSE: 187 MG/DL (ref 74–99)
METER GLUCOSE: 217 MG/DL (ref 74–99)
POTASSIUM SERPL-SCNC: 4.2 MMOL/L (ref 3.5–5)
SODIUM BLD-SCNC: 134 MMOL/L (ref 132–146)

## 2021-03-11 PROCEDURE — 80048 BASIC METABOLIC PNL TOTAL CA: CPT

## 2021-03-11 PROCEDURE — 94640 AIRWAY INHALATION TREATMENT: CPT

## 2021-03-11 PROCEDURE — 6370000000 HC RX 637 (ALT 250 FOR IP): Performed by: INTERNAL MEDICINE

## 2021-03-11 PROCEDURE — 36415 COLL VENOUS BLD VENIPUNCTURE: CPT

## 2021-03-11 PROCEDURE — 6360000002 HC RX W HCPCS: Performed by: NURSE PRACTITIONER

## 2021-03-11 PROCEDURE — 6360000002 HC RX W HCPCS: Performed by: INTERNAL MEDICINE

## 2021-03-11 PROCEDURE — 82962 GLUCOSE BLOOD TEST: CPT

## 2021-03-11 PROCEDURE — 6370000000 HC RX 637 (ALT 250 FOR IP): Performed by: STUDENT IN AN ORGANIZED HEALTH CARE EDUCATION/TRAINING PROGRAM

## 2021-03-11 PROCEDURE — 83735 ASSAY OF MAGNESIUM: CPT

## 2021-03-11 RX ORDER — LOPERAMIDE HYDROCHLORIDE 2 MG/1
2 CAPSULE ORAL 3 TIMES DAILY
Qty: 90 CAPSULE | Refills: 0 | DISCHARGE
Start: 2021-03-11 | End: 2021-04-10

## 2021-03-11 RX ORDER — MAGNESIUM SULFATE IN WATER 40 MG/ML
2000 INJECTION, SOLUTION INTRAVENOUS ONCE
Status: COMPLETED | OUTPATIENT
Start: 2021-03-11 | End: 2021-03-11

## 2021-03-11 RX ORDER — CHOLESTYRAMINE 4 G/9G
1 POWDER, FOR SUSPENSION ORAL 2 TIMES DAILY
Qty: 90 PACKET | Refills: 5 | DISCHARGE
Start: 2021-03-11 | End: 2022-10-11

## 2021-03-11 RX ORDER — DIPHENOXYLATE HCL/ATROPINE 2.5-.025/5
10 LIQUID (ML) ORAL 2 TIMES DAILY
Qty: 120 ML | Refills: 0 | Status: SHIPPED | DISCHARGE
Start: 2021-03-11 | End: 2021-03-11 | Stop reason: HOSPADM

## 2021-03-11 RX ORDER — LOPERAMIDE HYDROCHLORIDE 2 MG/1
2 CAPSULE ORAL 4 TIMES DAILY
Qty: 40 CAPSULE | Refills: 0 | DISCHARGE
Start: 2021-03-11 | End: 2021-03-11 | Stop reason: SDUPTHER

## 2021-03-11 RX ADMIN — BUDESONIDE 250 MCG: 0.25 SUSPENSION RESPIRATORY (INHALATION) at 08:52

## 2021-03-11 RX ADMIN — CHOLESTYRAMINE 4 G: 4 POWDER, FOR SUSPENSION ORAL at 08:40

## 2021-03-11 RX ADMIN — DIPHENOXYLATE HYDROCHLORIDE AND ATROPINE SULFATE 10 ML: 2.5; .025 SOLUTION ORAL at 08:39

## 2021-03-11 RX ADMIN — FERROUS SULFATE TAB 325 MG (65 MG ELEMENTAL FE) 325 MG: 325 (65 FE) TAB at 08:40

## 2021-03-11 RX ADMIN — ARFORMOTEROL TARTRATE 15 MCG: 15 SOLUTION RESPIRATORY (INHALATION) at 08:51

## 2021-03-11 RX ADMIN — PSYLLIUM HUSK 2 PACKET: 3.4 GRANULE ORAL at 08:40

## 2021-03-11 RX ADMIN — MAGNESIUM GLUCONATE 500 MG ORAL TABLET 400 MG: 500 TABLET ORAL at 15:14

## 2021-03-11 RX ADMIN — APIXABAN 5 MG: 5 TABLET, FILM COATED ORAL at 08:40

## 2021-03-11 RX ADMIN — INSULIN LISPRO 2 UNITS: 100 INJECTION, SOLUTION INTRAVENOUS; SUBCUTANEOUS at 11:35

## 2021-03-11 RX ADMIN — MAGNESIUM SULFATE HEPTAHYDRATE 2000 MG: 40 INJECTION, SOLUTION INTRAVENOUS at 15:14

## 2021-03-11 RX ADMIN — INSULIN LISPRO 1 UNITS: 100 INJECTION, SOLUTION INTRAVENOUS; SUBCUTANEOUS at 16:55

## 2021-03-11 RX ADMIN — ANTI-FUNGAL POWDER MICONAZOLE NITRATE TALC FREE: 1.42 POWDER TOPICAL at 08:40

## 2021-03-11 ASSESSMENT — PAIN SCALES - GENERAL
PAINLEVEL_OUTOF10: 0
PAINLEVEL_OUTOF10: 0

## 2021-03-11 NOTE — PROGRESS NOTES
Department of Internal Medicine  Nephrology Progress Note      Events reviewed. Patient was transferred to isolation due to exposure to Covid 19. SUBJECTIVE:  We are following Mrs. Banuelos for COLTON, hyponatremia, and hyperkalemia. Reports no complaints today.     Physical Exam:    VITALS:  /76   Pulse 88   Temp 97.4 °F (36.3 °C) (Temporal)   Resp 17   Ht 5' 2\" (1.575 m)   Wt 188 lb (85.3 kg)   SpO2 98%   BMI 34.39 kg/m²   24HR INTAKE/OUTPUT:      Intake/Output Summary (Last 24 hours) at 3/11/2021 1352  Last data filed at 3/11/2021 1143  Gross per 24 hour   Intake --   Output 1000 ml   Net -1000 ml       General Appearance: alert and oriented to person, place and time, well developed and well- nourished, in no acute distress  Skin: warm and dry, no rash or erythema  Head: normocephalic and atraumatic  Eyes: pupils equal, round, and reactive to light, extraocular eye movements intact, conjunctivae normal  ENT: tympanic membrane, external ear and ear canal normal bilaterally, nose without deformity, nasal mucosa and turbinates normal without polyps  Neck: supple and non-tender without mass, no thyromegaly or thyroid nodules, no cervical lymphadenopathy  Pulmonary/Chest: clear to auscultation bilaterally- no wheezes, rales or rhonchi, normal air movement, no respiratory distress  Cardiovascular: normal rate, regular rhythm, normal S1 and S2, no murmurs, rubs, clicks, or gallops,   Abdomen: soft, non-tender, non-distended, normal bowel sounds,  left colostomy bag with watery output, right mucous fistula with greenish mucus  Extremities: no cyanosis, clubbing; minimal pitting edema of bilateral lower extremity     Scheduled Meds:   magnesium sulfate  2,000 mg Intravenous Once    psyllium  2 packet Oral TID    diphenoxylate-atropine  10 mL Oral BID    miconazole   Topical BID    insulin lispro  0-6 Units Subcutaneous TID WC    insulin lispro  0-3 Units Subcutaneous Nightly    cholestyramine  1 packet Oral BID    sodium chloride flush  10 mL Intravenous Once    apixaban  5 mg Oral BID    ferrous sulfate  325 mg Oral Daily with breakfast    budesonide  0.25 mg Nebulization BID    And    Arformoterol Tartrate  15 mcg Nebulization BID     Continuous Infusions:   dextrose       PRN Meds:.glucose, dextrose, glucagon (rDNA), dextrose, albuterol, traMADol    DATA:    CBC:   Lab Results   Component Value Date    WBC 6.4 03/09/2021    RBC 3.55 03/09/2021    HGB 10.4 03/09/2021    HCT 32.8 03/09/2021    MCV 92.4 03/09/2021    MCH 29.3 03/09/2021    MCHC 31.7 03/09/2021    RDW 14.3 03/09/2021     03/09/2021    MPV 9.0 03/09/2021     CMP:    Lab Results   Component Value Date     03/11/2021    K 4.2 03/11/2021    K 6.1 03/02/2021    CL 93 03/11/2021    CO2 34 03/11/2021    BUN 19 03/11/2021    CREATININE 1.1 03/11/2021    GFRAA 59 03/11/2021    AGRATIO 0.7 11/27/2020    LABGLOM 49 03/11/2021    GLUCOSE 118 03/11/2021    PROT 6.8 03/09/2021    LABALBU 2.7 03/09/2021    CALCIUM 9.7 03/11/2021    BILITOT 0.7 03/09/2021    ALKPHOS 122 03/09/2021    AST 61 03/09/2021    ALT 23 03/09/2021     Magnesium:    Lab Results   Component Value Date    MG 1.5 03/11/2021     Phosphorus:    Lab Results   Component Value Date    PHOS 4.3 03/09/2021     Radiology Review:      CT of abdomen and pelvis without IV contrast 3/2/2021   1.  Relatively recent postoperative changes with incomplete closure of the   subcutaneous soft tissues overlying the linea alba of the anterior rectus   abdominal muscle across the midline. 2.  Multiple previous bowel surgery with overall shortening of the bowel. 3.  Presence of a right and left-sided patent ostomy, ileostomy on the right,   jejunostomy on the left. 4.  More late subcapsular hematoma of the right lobe of the liver 7.7 x 2 x   4.5 cm. 5.  Status post cholecystectomy, no dilatation biliary tree pancreatic ductal   system. 6.  No obstructive uropathy.    7.  No acute inflammatory changes in the mid mesentery fat planes, free   intraperitoneal air or ascites or indication for bowel obstruction. CXR 3/2/2021   Faint reticular airspace opacities at both lung bases may be on the basis of   pulmonary edema or atypical/viral pneumonia. BRIEF SUMMARY OF INITIAL CONSULT:    Briefly Lucy Miller is 79 y.o. female with history of colon cancer (s/p right hemicolectomy, small bowel enterotomy, repair than resection, creation of loop proximal ileostomy and loop distal ileal mucous fistula; complication with abdominal abscess s/p drainage removal), trach decannulated, hypertension (carvedilol and hydralazine), factor V Leiden with chronic DVT of bilateral lower extremity (Eliquis, plan to switch to 2.5 mg twice daily after April 2), type 2 diabetes mellitus (metformin 1000 mg twice daily), history of COPD, who was admitted on 3/2/2021 from skilled nursing facility for complaints of hypotension and altered mental status. Per patient's daughter her blood pressure was 60 / 20 mmHg in the morning, EMS was called who gave her IV fluids and her blood pressure was 90 x 40 mmHg on arrival to the ED. Patient complained that she is continued to have increased output from her ileostomy over the past couple of days, has been drinking water due to feeling thirsty and chewing on ice cubes. In the ED, initial evaluation showed patient to have hyponatremia with sodium of 123 meq/L, hyperkalemia with potassium of 6.1 MEQ/L, low chloride 82 mmol/L, BUN/creatinine of 50/2.8. Nephrology was consulted for electrolyte abnormality and COLTON. Problems resolved:  Hyperkalemia 2/2 COLTON (decreased GFR), resolved. K levels improved. COLTON stage III, volume responsive prerenal COLTON, 2/2 increased output from colostomy with decreased intake. FeNa 0.4% supports prerenal component. Resolved with IV fluids administration.   Recurrent Hypovolemic hypoosmolar hyponatremia, 2/2 increased solute rich water

## 2021-03-11 NOTE — CARE COORDINATION
Social Work Discharge/Planning:    Discharge order noted. Sasha Santana spoke with Daryl Thomson with [facility] to setup patient transportation. Per Daryl Thomson, patient is setup for 6pm today. SW notified liaison Margarette Galloway, patient's daughter Henrietta Brian [phone]), patient, charge RN, and bedside RN. Ambulance form and envelope are on softchart. AJ and destination are completed.      Jose Blackman, JAMIE  [phone]

## 2021-03-11 NOTE — PROGRESS NOTES
ET note: Following patient for ileostomy management. Pouch intact, no need to change. Discharge tonight. Patient has supplies for facility.  [facility]

## 2021-03-11 NOTE — PROGRESS NOTES
GENERAL SURGERY  DAILY PROGRESS NOTE  3/11/2021    Chief Complaint   Patient presents with    Altered Mental Status     LETHARGIC/HYPOTENSIVE AT FACILTY       Subjective:  Feels well. Happy that ileostomy output is improved.  1400cc yesterday    Objective:  /78   Pulse 90   Temp 98 °F (36.7 °C) (Oral)   Resp 18   Ht 5' 2\" (1.575 m)   Wt 188 lb (85.3 kg)   SpO2 98%   BMI 34.39 kg/m²     GENERAL:  Laying in bed, awake, alert, cooperative, no apparent distress  LUNGS:  No increased work of breathing  CARDIOVASCULAR:  RR  ABDOMEN:  Soft, non-tender, non-distended, midline incision CDI, LLQ stoma with slightly thick stoma output, green  EXTREMITIES: No edema or swelling  SKIN: Warm and dry    Assessment/Plan:  79 y.o. female with high ileostomy output, COLTON    Continue LIYAH  Continue questran 4g BID, lomotil 10 mL BID, psyllium 2 packet TID  COLTON improving- Cr 1.1    Electronically signed by Ingrid Arrieta DO on 3/11/2021 at 7:09 AM

## 2021-03-11 NOTE — PLAN OF CARE
Problem: Falls - Risk of:  Goal: Will remain free from falls  Description: Will remain free from falls  3/11/2021 1140 by Olivia Cho RN  Outcome: Completed  3/11/2021 0137 by Juliana Smyth RN  Outcome: Met This Shift  Goal: Absence of physical injury  Description: Absence of physical injury  3/11/2021 1140 by Olivia Cho RN  Outcome: Completed  3/11/2021 0137 by Juliana Smyht RN  Outcome: Met This Shift

## 2021-04-02 PROBLEM — N39.0 UTI (URINARY TRACT INFECTION): Status: RESOLVED | Noted: 2021-03-03 | Resolved: 2021-04-02

## 2021-05-19 ENCOUNTER — HOSPITAL ENCOUNTER (OUTPATIENT)
Dept: WOUND CARE | Age: 71
Discharge: HOME | End: 2021-05-19
Payer: MEDICARE

## 2021-05-19 PROCEDURE — 99204 OFFICE O/P NEW MOD 45 MIN: CPT

## 2021-05-19 NOTE — FLOWSHEET NOTE
Clinical Level of Care Assessment    Outpatient Ostomy Care      NAME:  Snehal Pedro  YOB: 1950  MEDICAL RECORD NUMBER:  [id number]   DATE:  5/19/2021      Patient Shellie Cortes Assessment- Document in Flowsheet I&O   Points   Review of chart []   0   Assess Complete Ostomy tab in Navigator for assessment of; stoma status, peristomal skin, presence of hernia/stool consistency/diet/related medications   Simple adjustments to pouch size/pouch system, new stoma pattern, accessory addition/deletion. []   1   Assess Complete Ostomy tab in Navigator for assessment of; stoma status, peristomal skin, presence of hernia/stool consistency/diet/related medications   Moderate adjustments to pouch size/pouch system, new stoma pattern, accessory addition/deletion. Observe patient/caregiver with hands-on care. 1-2 adjustments to pouch size/system/skin care/accessory addition or deletion. []   2   Assess Complete Ostomy tab in Navigator for assessment of; stoma status, peristomal skin, presence of hernia/stool consistency/diet/related medications   Complex adjustments to pouch size/pouch system, new stoma pattern, accessory addition/deletion. 3 or more complex adjustments to pouch size/system/skin care/accessory addition or deletion. Observe patient/caregiver with hands-on care. Assess patient/patient abdomen for optimal pre-marked stoma site. Assess patient abdomen for type of hernia belt/accessory needed. [x]   3         Ambulation Status Documented in CN Clinical Note  Status Definition Points   Independent Independently able to ambulate. Fully able (without any assistance) to get on/off exam table/chair. []   0   Minimal Physical Assistance Requires physical assistance of one person to ambulate and/or position patient to be examined. Includes necessary physical assistance to position lower extremities on/off stool.    []   1   Moderate Physical Assistance Requires at least one staff member to physically assist patient in ambulating into treatment room, and/or on off chair/bed. Requires assistance to bathroom. []   2   Full Assistance Requires assistance of at least two staff members to transfer patient into treatment room and/or on/off bed/chair. \"Total Transfer\". Unable to use bathroom requires bedside commode and/or bedpan [x]   3       Teaching Effort Documented in [facility] Clinical Note  Effort Definition Points   No Teaching  []   0   General Initial/Simple lesson:  Assess readiness to learn, assess patient learning style to determine educational flow/special needs for learning. Teaching related to 1-3 topics  Documentation in [facility] completed. []   1   Intermediate Assess readiness to learn, assess patient learning style to determine educational flow/special needs for learning. Teaching related to 3-4 topics. Hernia belt application and care considerations  Documentation in [facility] completed. [x]   2   Complex Assess readiness to learn, assess patient learning style to determine educational flow/special needs for learning. Teaching of greater than 5 additional topics   Pre-operative ostomy education with review of written resources for patient/family/caregiver as needed. Demonstration/return demonstration of ostomy irrigation  Documentation in [facility] completed. []   3     Patient Assessment and Planning in [facility] Clinical Note   Planning Definition Points   Simple Simple pouch change procedure completed and reviewed with patient/family/caregiver   Documentation in [facility] completed. []   1   Intermediate Moderate level of follow-up needs:   Pouch change/discharge procedure revised and reviewed with patient/caregiver. Communications with outside resources; i.e. Telephone calls to Surgeon/ PCP, family/caregiver, home health, ECF. Documentation in [facility] completed.      [x]   2   Complex Complex level of instructions/changes:   Family/Caregiver learning/demonstration/return demonstration visit. Pouching/discharge procedure revised/reviewed with patient/family/caregiver. Contact with outside resources; i.e. communication with Surgeon/ PCP, home health, ECF. Contact/referral to ostomy appliance supplier for new or additional products. Review when to call [facility] or schedule follow-up visit. Referral to Emergency Department   Documentation in [facility] completed. []   3       Is this the Patient's First Visit with [facility] @ [facility]? Yes    Is this Patient Established to this [facility] within the last 3 years? Yes             Clinical Level of Care      Points  0-3  Level 1 []     Points  4-6  Level 2 []     Points  7-8  Level 3 []     Points  9-10  Level 4 [x]     Points  11-12  Level 5 []        05/19/21 1200   Ileostomy Loop ileostomy LLQ   Placement Date: 11/27/20   Pre-existing: No  Ileostomy Type: Loop ileostomy  Location: LLQ   Stomal Appliance 1 piece; Changed;Leaking  (with convexity)   Stoma  Assessment Flush;Protrudes; Red   Peristomal Assessment   (red, raw)   Treatment Liquid skin barrier  (barrier ring, barrier strips)   Stool Color Brown   Stool Appearance Loose   Stool Amount Small   Mucous Fistula RLQ   No Placement Date or Time found. Pre-existing: Yes  Location: RLQ   Stomal Appliance 1 piece; Changed  (flat)   Stoma  Assessment Moist;Protrudes; Red   Peristomal Assessment Intact   Treatment Bag change  (skin care, barrier strips)     Patient to ET nurse from hospital\  Complicated hospital course  Pouch leaking  pouch removed- coloplast pouch applied with barrier ring, strips, belt  Reviewed basic care  Patient using multiple products at home  Pattern provided to both pouches  Instructed on use of ostomy belt  Will follow up next week    Electronically signed by Oscar Tate RN on 5/19/2021 at 4:22 PM

## 2021-05-26 ENCOUNTER — HOSPITAL ENCOUNTER (OUTPATIENT)
Dept: WOUND CARE | Age: 71
Discharge: HOME | End: 2021-05-26
Payer: MEDICARE

## 2021-05-26 PROCEDURE — 99212 OFFICE O/P EST SF 10 MIN: CPT

## 2021-05-26 NOTE — FLOWSHEET NOTE
Clinical Level of Care Assessment    Outpatient Ostomy Care      NAME:  Jamaica Kirkpatrick OF BIRTH:  1950  MEDICAL RECORD NUMBER:  [id number]   DATE:  5/26/2021      Patient Russel De Jesus Assessment- Document in Flowsheet I&O   Points   Review of chart []   0   Assess Complete Ostomy tab in Navigator for assessment of; stoma status, peristomal skin, presence of hernia/stool consistency/diet/related medications   Simple adjustments to pouch size/pouch system, new stoma pattern, accessory addition/deletion. []   1   Assess Complete Ostomy tab in Navigator for assessment of; stoma status, peristomal skin, presence of hernia/stool consistency/diet/related medications   Moderate adjustments to pouch size/pouch system, new stoma pattern, accessory addition/deletion. Observe patient/caregiver with hands-on care. 1-2 adjustments to pouch size/system/skin care/accessory addition or deletion. [x]   2   Assess Complete Ostomy tab in Navigator for assessment of; stoma status, peristomal skin, presence of hernia/stool consistency/diet/related medications   Complex adjustments to pouch size/pouch system, new stoma pattern, accessory addition/deletion. 3 or more complex adjustments to pouch size/system/skin care/accessory addition or deletion. Observe patient/caregiver with hands-on care. Assess patient/patient abdomen for optimal pre-marked stoma site. Assess patient abdomen for type of hernia belt/accessory needed. []   3         Ambulation Status Documented in CN Clinical Note  Status Definition Points   Independent Independently able to ambulate. Fully able (without any assistance) to get on/off exam table/chair. []   0   Minimal Physical Assistance Requires physical assistance of one person to ambulate and/or position patient to be examined. Includes necessary physical assistance to position lower extremities on/off stool.    [x]   1   Moderate Physical Assistance Requires at least one staff member to demonstration visit. Pouching/discharge procedure revised/reviewed with patient/family/caregiver. Contact with outside resources; i.e. communication with Surgeon/ PCP, home health, ECF. Contact/referral to ostomy appliance supplier for new or additional products. Review when to call [facility] or schedule follow-up visit. Referral to Emergency Department   Documentation in [facility] completed. []   3       Is this the Patient's First Visit with [facility] @ [facility]? No    Is this Patient Established to this [facility] within the last 3 years? Yes             Clinical Level of Care      Points  0-3  Level 1 []     Points  4-6  Level 2 [x]     Points  7-8  Level 3 []     Points  9-10  Level 4 []     Points  11-12  Level 5 []        05/26/21 1110   Ileostomy Loop ileostomy LLQ   Placement Date: 11/27/20   Pre-existing: No  Ileostomy Type: Loop ileostomy  Location: LLQ   Stomal Appliance 1 piece; Changed  (with convexity)   Stoma  Assessment Flush;Red;Moist   Peristomal Assessment   (slightly red, raw)   Treatment Bag change  (skin care, barrier ri)   Stool Color Brown   Stool Appearance Loose   Stool Amount Small   follow up from last week  Pouch applied last week by ostomy nurse did not leak  Skin improved, remains slightly red and raw  1 piece convex with barrier ring and strips with belt applied  Reviewed basic care and options for patient to apply at home via self  Pattern provided  Will follow up prn    Electronically signed by Colletta Lion, RN on 5/26/2021 at 11:44 AM

## 2021-10-08 ENCOUNTER — HOSPITAL ENCOUNTER (OUTPATIENT)
Dept: INFUSION THERAPY | Age: 71
Discharge: HOME | End: 2021-10-08
Payer: MEDICARE

## 2021-10-08 ENCOUNTER — OFFICE VISIT (OUTPATIENT)
Dept: ONCOLOGY | Age: 71
End: 2021-10-08
Payer: MEDICARE

## 2021-10-08 VITALS
HEART RATE: 81 BPM | HEIGHT: 62 IN | DIASTOLIC BLOOD PRESSURE: 68 MMHG | BODY MASS INDEX: 33 KG/M2 | WEIGHT: 179.3 LBS | TEMPERATURE: 98.4 F | OXYGEN SATURATION: 97 % | SYSTOLIC BLOOD PRESSURE: 140 MMHG

## 2021-10-08 DIAGNOSIS — C18.9 MALIGNANT NEOPLASM OF COLON, UNSPECIFIED PART OF COLON (HCC): Primary | ICD-10-CM

## 2021-10-08 PROCEDURE — 99214 OFFICE O/P EST MOD 30 MIN: CPT | Performed by: INTERNAL MEDICINE

## 2021-10-08 PROCEDURE — 99212 OFFICE O/P EST SF 10 MIN: CPT

## 2021-10-08 NOTE — PROGRESS NOTES
[address] MED ONCOLOGY  [address] [facility] [address]  Dept: [phone]  Attending Progress Note      Reason for Visit:   1. Recurrent VTE. 2. Factor V Leiden Homozygosity. 3. Colon cancer. Referring Physician:  Emilia Conroy NP    PCP:  Ingrid Alaniz MD    History of Present Illness: The patient is a pleasant 22-year-old lady with a PMH significant for obesity, HTN, and hyperlipidemia, who was diagnosed with PE and bilateral DVTs that were provoked following ventral hernia repair in March 2017, she had her surgery done in [facility], she was anticoagulated with Coumadin, she was complaining of left knee pain, had a venous US done on 3/1/2019, revealing an acute DVT in the left posterior tibial vein, chronic appearing thromus from proximal femoral to peroneal veins. She was started on Lovenox 100 mg subq bid, INR was therapeutic, 2.2. She does not like the injections, prefers to be on an oral anticoagulant, AC was changed to Eliquis. FH is negative for VTE. The patient was last seen in the office in November 2020, the patient was diagnosed with colon cancer, she underwent a right hemicolectomy on 11/28/2020 by Dr. Reena Perez at [facility], was diagnosed with stage I colon cancer,  Afterwards the patient was peritoneal and was reexplored with findings of missed enterotomy which was repaired. After this second surgery experienced a cardiopulmonary arrest, ROSC was achieved and she was intubated. Patient was transferred from [facility] to the [facility]. On 11/27 she underwent exploratory laparotomy with small bowel resection plus loop ileostomy plus distal mucous fistula and excision of mesh. Afterwards patient developed an abdominal abscess that required IR drainage on 12/3. Patient underwent a tracheostomy on 12/8.     The patient is having issues with the ostomy, at this time she feels that her quality of life is impaired, she was told that ostomy reversal would be done at [facility]. She had significant weight loss. She continues to be on Eliquis, no side effects. Review of Systems;  CONSTITUTIONAL: No fever, chills. Good appetite, feeling tired. Pos for weight loss of 67lbs. ENMT: Eyes: No diplopia; Nose: No epistaxis. Mouth: No sore throat. RESPIRATORY: No hemoptysis, shortness of breath, cough. CARDIOVASCULAR: No chest pain, palpitations. GASTROINTESTINAL: No nausea/vomiting, abdominal pain, diarrhea/constipation. GENITOURINARY: No dysuria, urinary frequency, hematuria. Pos dizziness. NEURO: No syncope, presyncope, headache.   Remainder:  ROS NEGATIVE    Past Medical History:      Diagnosis Date    Hyperlipidemia     Hypertension     LBBB (left bundle branch block)     Obesity      Patient Active Problem List   Diagnosis    Mixed hyperlipidemia    Essential hypertension    LBBB (left bundle branch block)    Chronic deep vein thrombosis (DVT) of lower extremity (HCC)    Personal history of pulmonary embolism    Type 2 diabetes mellitus with complication, without long-term current use of insulin (HCC)    Chronic diastolic (congestive) heart failure (HCC)    Vitamin D deficiency    Venous insufficiency    Age-related osteoporosis without current pathological fracture    Other specified hypothyroidism    Primary generalized (osteo)arthritis    Chronic pain syndrome    Morbid obesity with BMI of 45.0-49.9, adult ([facility])    Seasonal allergic rhinitis due to pollen    Moderate persistent asthma with acute exacerbation    Class 3 severe obesity due to excess calories with serious comorbidity and body mass index (BMI) of 45.0 to 49.9 in adult [facility])    Encounter for immunization    Factor V deficiency (HCC)    Iron deficiency anemia due to chronic blood loss    Acute kidney injury (HCC)    CKD (chronic kidney disease) stage 3, GFR 30-59 ml/min ([facility])    Hyponatremia or Organizations:     Attends Club or Organization Meetings:     Marital Status:    Intimate Partner Violence:     Fear of Current or Ex-Partner:     Emotionally Abused:     Physically Abused:     Sexually Abused: Allergies: Allergies   Allergen Reactions    Amlodipine      Hives      Cefdinir     E-Mycin [Erythromycin]      Made her dizzy and she didn't feel well    Lisinopril     Seasonal        Physical Exam:  BP (!) 140/68   Pulse 81   Temp 98.4 °F (36.9 °C)   Ht 5' 2\" (1.575 m)   Wt 179 lb 4.8 oz (81.3 kg)   SpO2 97%   BMI 32.79 kg/m²     GENERAL: Alert, oriented x 3, not in acute distress. HEENT: PERRLA; EOMI. Oropharynx clear. NECK: Supple. No palpable cervical or supraclavicular lymphadenopathy. LUNGS: Good air entry bilaterally. No wheezing, crackles or rhonchi. CARDIOVASCULAR: Regular rate. No murmurs, rubs or gallops. ABDOMEN: Midline incision, ostomy site Intact, clear drainage in the bag on the right. EXTREMITIES: mildt bilateral LLE, she is wearing compression stockings, no tenderness/erythema or warmth of the calves. NEUROLOGIC: No focal deficits. Impression/Plan:     The patient is a pleasant 66-year-old lady with a PMH significant for obesity, HTN, and hyperlipidemia, who was diagnosed with PE and bilateral DVTs that were provoked following ventral hernia repair in March 2017, she had her surgery done in [facility], she was anticoagulated with Coumadin, she was complaining of left knee pain, had a venous US done on 3/1/2019, revealing an acute DVT in the left posterior tibial vein, chronic appearing thromus from proximal femoral to peroneal veins. She was started on Lovenox 100 mg subq bid, INR was therapeutic, 2.2. She does not like the injections, prefers to be on an oral anticoagulant. Lovenox was d/sade and she was started on Eliquis. FH is negative for VTE.      Patient with history of provoked PE and bilateral DVTs in 2017, she has recurrent VTE, ordered testing for inherited thrombophilia that is reliable in the setting of acute thrombosis. She is homozygous for factor V Leiden mutation, this was discussed with the patient and her son, ACLA are neg, anti B2GPI Antibodies are neg, PT gene mutation is negative. We discussed that obesity and decreased mobility increase the risk of VTE, I encouraged her to continue with weight loss. She had a f/up LLE venous US done revealing improvement of the DVT, she has a persistent occlusive thrombus in the proximal left superficial femoral vein and nonocclusive thrombus in the mid to distal superficial femoral vein, it is hard to tell for how long she had had those clots, continue  Eliquis, I do recommend lifelong anticoagulation unless if she has a contraindication to [facility] in the future. The patient needs financial assistance on [employer], referral was placed to [employer]. Will be provided with samples today. The patient was last seen in the office in November 2020, the patient was diagnosed with colon cancer, CEA was 0.6, there was no evidence of metastatic disease she underwent a right hemicolectomy on 11/28/2020 by Dr. Sonal Avalos at [facility], was diagnosed with stage I colon cancer,  Afterwards the patient was peritoneal and was reexplored with findings of missed enterotomy which was repaired. After this second surgery experienced a cardiopulmonary arrest, ROSC was achieved and she was intubated. Patient was transferred from [facility] to the [facility] facility. On 11/27 she underwent exploratory laparotomy with small bowel resection plus loop ileostomy plus distal mucous fistula and excision of mesh. Afterwards patient developed an abdominal abscess that required IR drainage on 12/3. The patient is having issues with the ostomy, at this time she feels that her quality of life is impaired, she was told that ostomy reversal would be done at [facility].   The surveillance guidelines were reviewed with the patient, she will need to have a colonoscopy at the 1 year , I offered the patient referral to [facility] to explore the option of ostomy reversal, the patient would like to follow-up with Dr. Ashwin Fuentes first, she will let me know if she would like to place the referral.    RTC in 3 months       Thank you for allowing us to participate in the care of Mrs. Banuelos.     Lina Silvestre MD   HEMATOLOGY/MEDICAL ONCOLOGY  [address] [place] [address] MED ONCOLOGY  [facility] [address] [facility] [address]  Dept: [phone]

## 2021-10-08 NOTE — PROGRESS NOTES
Message sent to Ciro Lira via [employer] teams for need for assistance with purchasing Eliquis, awaiting response. Sample given to patient today.

## 2022-03-15 LAB
ANION GAP SERPL CALCULATED.3IONS-SCNC: 12 MEQ/L (ref 3–11)
BUN BLDV-MCNC: 86 MG/DL (ref 8–21)
CALCIUM SERPL-MCNC: 9 MG/DL (ref 8.5–10.5)
CHLORIDE BLD-SCNC: 84 MEQ/L (ref 98–107)
CHLORIDE URINE: < 15 MEQ/L
CO2: 33 MEQ/L (ref 21–31)
CREAT SERPL-MCNC: 2.4 MG/DL (ref 0.4–1)
CREATININE + EGFR PANEL: 24 ML/MIN
GFR NON-AFRICAN AMERICAN: 20 ML/MIN
GLUCOSE BLD-MCNC: 150 MG/DL (ref 70–99)
OSMOLALITY URINE: 444 MMOL/L (ref 500–800)
OSMOLALITY: 311 MMOL/L (ref 280–295)
POTASSIUM SERPL-SCNC: 4 MEQ/L (ref 3.6–5)
POTASSIUM, URINE: 62 MEQ/L
SODIUM BLD-SCNC: 129 MEQ/L (ref 135–145)
SODIUM URINE: < 10 MEQ/L

## 2022-04-26 ENCOUNTER — OFFICE VISIT (OUTPATIENT)
Dept: CARDIOLOGY CLINIC | Age: 72
End: 2022-04-26
Payer: MEDICARE

## 2022-04-26 VITALS
BODY MASS INDEX: 30.67 KG/M2 | HEIGHT: 62 IN | RESPIRATION RATE: 18 BRPM | DIASTOLIC BLOOD PRESSURE: 70 MMHG | WEIGHT: 166.7 LBS | HEART RATE: 80 BPM | SYSTOLIC BLOOD PRESSURE: 110 MMHG

## 2022-04-26 DIAGNOSIS — N18.31 STAGE 3A CHRONIC KIDNEY DISEASE (HCC): ICD-10-CM

## 2022-04-26 DIAGNOSIS — Z93.2 ILEOSTOMY PRESENT (HCC): ICD-10-CM

## 2022-04-26 DIAGNOSIS — I10 ESSENTIAL HYPERTENSION: ICD-10-CM

## 2022-04-26 DIAGNOSIS — E78.2 MIXED HYPERLIPIDEMIA: ICD-10-CM

## 2022-04-26 DIAGNOSIS — I82.509 CHRONIC DEEP VEIN THROMBOSIS (DVT) OF LOWER EXTREMITY, UNSPECIFIED LATERALITY, UNSPECIFIED VEIN (HCC): ICD-10-CM

## 2022-04-26 DIAGNOSIS — E11.8 TYPE 2 DIABETES MELLITUS WITH COMPLICATION, WITHOUT LONG-TERM CURRENT USE OF INSULIN (HCC): ICD-10-CM

## 2022-04-26 DIAGNOSIS — I50.32 CHRONIC DIASTOLIC (CONGESTIVE) HEART FAILURE (HCC): ICD-10-CM

## 2022-04-26 DIAGNOSIS — J45.41 MODERATE PERSISTENT ASTHMA WITH ACUTE EXACERBATION: ICD-10-CM

## 2022-04-26 DIAGNOSIS — Z98.890 H/O EXPLORATORY LAPAROTOMY: ICD-10-CM

## 2022-04-26 DIAGNOSIS — I44.7 LBBB (LEFT BUNDLE BRANCH BLOCK): Primary | ICD-10-CM

## 2022-04-26 DIAGNOSIS — I49.3 PVC'S (PREMATURE VENTRICULAR CONTRACTIONS): ICD-10-CM

## 2022-04-26 DIAGNOSIS — I50.22 CHRONIC SYSTOLIC HEART FAILURE (HCC): ICD-10-CM

## 2022-04-26 DIAGNOSIS — I87.2 VENOUS INSUFFICIENCY: ICD-10-CM

## 2022-04-26 PROCEDURE — 99214 OFFICE O/P EST MOD 30 MIN: CPT | Performed by: INTERNAL MEDICINE

## 2022-04-26 PROCEDURE — 93000 ELECTROCARDIOGRAM COMPLETE: CPT | Performed by: INTERNAL MEDICINE

## 2022-04-26 RX ORDER — M-VIT,TX,IRON,MINS/CALC/FOLIC 27MG-0.4MG
1 TABLET ORAL DAILY
COMMUNITY

## 2022-04-26 RX ORDER — CALCIUM CARBONATE 300MG(750)
400 TABLET,CHEWABLE ORAL DAILY
COMMUNITY

## 2022-04-26 RX ORDER — SODIUM CHLORIDE 1000 MG
1 TABLET, SOLUBLE MISCELLANEOUS 3 TIMES DAILY
COMMUNITY

## 2022-04-26 RX ORDER — METOPROLOL SUCCINATE 25 MG/1
12.5 TABLET, EXTENDED RELEASE ORAL DAILY
Qty: 90 TABLET | Refills: 3 | Status: SHIPPED | OUTPATIENT
Start: 2022-04-26

## 2022-04-26 NOTE — PROGRESS NOTES
Out Patient CARDIOLOGY FOLLOW UP    Name: Edrie Holstein    Age: 70 y.o. Date of Service: 4/27/2022      Referring Physician: No admitting provider for patient encounter. Chief Complaint   Patient presents with    Heart Problem     LBBB- Ov- pt has no cardiac complaints        History of Present Illness: 59-year-old female with history of factor V Leyden deficiency with subsequent prior DVTs and on chronic anticoagulation, hypertension, obesity, chronic left bundle branch block, hyperlipidemia, prior history of tobacco abuse, history of heart failure with depressed LV function with EF in 2009 noted to be 38%. Last echocardiogram in August 2020 revealed improvement of left ventricular systolic function with guideline directed medical therapy with EF bilateral noted to be 55 to 65%. Also has sleep apnea, hernia repair in 2016, and obesity. Chart record shows that apparently patient had abdominal discomfort in November 2021 and was evaluated at outside hospital and underwent exploratory laparotomy with small bowel resection and loop proximal ileostomy. Patient reports he did have complications during the surgery and was transferred to [place] [facility] and had left distal ileal mucosal fistula of the right lower quadrant drain placement and excision of intra-abdominal mesh she was also apparently found to have abdominal abscess and sepsis with Candida albicans and Streptococcus and had CT-guided abdominal abscess drainage. She presented for follow-up visit today and reports since discharge apparently has not follow-up with general surgery. She reports she would like to have her ileostomy reversed. On today's visit she reports she has been having increased ostomy output and subsequently patient is advised to follow-up with GI or general surgery either here at the [facility] area or at the [place] [facility] for further evaluation and management. She will follow-up in 3 months.   She denies other complaints but report has not been active due to knee discomfort and not able to walk much. She will come back in 3 months and if preoperative cardiovascular evaluation is needed we will discuss next visit          Medical history:  1. Obesity. BMI  BMI 45.51 2020  2. IKE () and appendectomy.    3. Cholecystectomy, abdominal hernia repair and colon perforation (). Subsequent takedown of colostomy.    4. Chronic LBBB (). 5. Hypertension. 6. Seasonal allergies. 7. Hyperlipidemia. 8. Cigarette abuse, 40 pack years. Quit in . 9. Allergy to erythromycin. 10. No history MI, CHF, COPD or stroke. 11. Family history positive. Her father  of diabetes complications/renal failure. 12. [address] stress MPS, 2009. Normal perfusion, diffuse hypokinesis, EF 38%. 13. Echo, 2009. Global hypokinesis, estimated EF 40-45%. Stage I diastolic dysfunction, LA 3.5. No pericardial effusion (Dr. Claudia Triplett). 14. Sleep study, 2009.    15. Echo, 2011. LA 5.2. Mild LVEDD. Mild global hypokinesis. E/A 1.18 and E/E' 16.4. RVSP normal. EF 45%. Mild AS with mean gradient 9.  16. Limited echo, 2014. AURORA = 36 (moderate dilatation). E/A 1.29. E/E' 19. LA AP diameter 5.0.   17. Hernia repair, [facility], 2016.  Reports unavailable.  Possible DVT. 18. Echocardiogram, [facility], 2016. \"Moderate septal hypertrophy\".  Estimated ejection fraction, \"lower limits of normal estimated at 50%\".  Mild ERMIAS. 19. Ultrasound, 2016, [facility], \"positive for deep vein thrombosis\". 20. Limited echo, 2016.  EF 55-65%  Mild CLVH.  Stage II diastolic dysfunction.  Mild LA dilatation.    21. ER evaluation, 2016. Facial swelling.  Lisinopril stopped, 2016.   22. Follow up with Dr. Lucy Cowart, 2017. 23. Echo, 2018. Essentially normal.  24. Outpatient cardiac assessment, 2020.  Noncompliant with Yazan Joel for reinstitution beta-blocker. Yareli Contreras ordered. 25. Echo, 2020. LV not dilated.  No WMA.   Estimated EF 55-65%.  Normal RV.  Left atrium mildly dilated.  Mild MR.  TR jet not recorded.  Mild aortic valve stenosis.  Peak velocity 2.22 m/s.     Review of Systems:   Cardiac: As per HPI  General: Denies fever or chills  Pulmonary: As per HPI  HEENT: Denies runny nose  GI: No complaints  : No complaints  Endocrine: Denies night sweats  Musculoskeletal: No complaints  Skin: Dry skin  Neuro: No complaints  Psych: Denies depression    Past Medical History:  Past Medical History:   Diagnosis Date    Hyperlipidemia     Hypertension     LBBB (left bundle branch block)     Obesity        Past Surgical History:  Past Surgical History:   Procedure Laterality Date    APPENDECTOMY      CHOLECYSTECTOMY      COLOSTOMY      due to divertiulosis since than it was reversed    HERNIA REPAIR      HYSTERECTOMY, TOTAL ABDOMINAL         Family History:  Family History   Problem Relation Age of Onset    Cancer Mother     Diabetes Father     Kidney Disease Father        Social History:  Social History     Socioeconomic History    Marital status:      Spouse name: Not on file    Number of children: Not on file    Years of education: Not on file    Highest education level: Not on file   Occupational History    Not on file   Tobacco Use    Smoking status: Former Smoker     Packs/day: 1.00     Years: 40.00     Pack years: 40.00     Types: Cigarettes     Quit date: 2008     Years since quittin.3    Smokeless tobacco: Never Used   Vaping Use    Vaping Use: Never used   Substance and Sexual Activity    Alcohol use: Yes     Comment: occassionally    Drug use: No    Sexual activity: Not Currently   Other Topics Concern    Not on file   Social History Narrative    Not on file     Social Determinants of Health     Financial Resource Strain:     Difficulty of Paying Living Expenses: Not on file   Food Insecurity:     Worried About Running Out of Food in the Last Year: Not on file    Ran Out of Food in the Last Year: Not on file   Transportation Needs:     Lack of Transportation (Medical): Not on file    Lack of Transportation (Non-Medical): Not on file   Physical Activity:     Days of Exercise per Week: Not on file    Minutes of Exercise per Session: Not on file   Stress:     Feeling of Stress : Not on file   Social Connections:     Frequency of Communication with Friends and Family: Not on file    Frequency of Social Gatherings with Friends and Family: Not on file    Attends Jainism Services: Not on file    Active Member of [address] [place] [address] [employer] or Organizations: Not on file    Attends Club or Organization Meetings: Not on file    Marital Status: Not on file   Intimate Partner Violence:     Fear of Current or Ex-Partner: Not on file    Emotionally Abused: Not on file    Physically Abused: Not on file    Sexually Abused: Not on file   Housing Stability:     Unable to Pay for Housing in the Last Year: Not on file    Number of [place] in the Last Year: Not on file    Unstable Housing in the Last Year: Not on file       Allergies: Allergies   Allergen Reactions    Amlodipine      Hives      Cefdinir     E-Mycin [Erythromycin]      Made her dizzy and she didn't feel well    Lisinopril     Seasonal        Home Medications:  Prior to Admission medications    Medication Sig Start Date End Date Taking?  Authorizing Provider   Magnesium 400 MG TABS Take 400 mg by mouth daily   Yes Historical Provider, MD   Diphenoxylate-Atropine (LOMOTIL PO) Take 2.5 mg by mouth in the morning and at bedtime   Yes Historical Provider, MD   sodium chloride 1 g tablet Take 1 g by mouth 3 times daily   Yes Historical Provider, MD   NONFORMULARY in the morning, at noon, and at bedtime collogen and vitamin c   Yes Historical Provider, MD   Multiple Vitamins-Minerals (THERAPEUTIC MULTIVITAMIN-MINERALS) tablet Take 1 tablet by mouth daily   Yes Historical Provider, MD   metoprolol succinate (TOPROL XL) 25 MG extended release tablet Take 0.5 tablets by mouth daily 4/26/22  Yes Dawood Mohamud MD   ferrous sulfate (IRON 325) 325 (65 Fe) MG tablet ferrous sulfate Ferrous Sulfate Active 325 MG Oral Daily After A Meal 100 October 23rd, 2020 10:46am 10-  [facility] ([id number]) 10/23/20  Yes Historical Provider, MD   apixaban (ELIQUIS) 5 MG TABS tablet Take 1 tablet by mouth 2 times daily 10/2/20  Yes Angelito Bowie MD   lovastatin (MEVACOR) 40 MG tablet Take 1 tablet by mouth nightly 8/3/20  Yes ERNESTINE Campbell - CNP   PROAIR  (90 Base) MCG/ACT inhaler Inhale 1 puff into the lungs 4 times daily 6/25/20  Yes Cyndy Jonas MD   Cholecalciferol (VITAMIN D3) 25 MCG (1000 UT) CAPS Take by mouth daily   Yes Historical Provider, MD   Calcium Carbonate (CALTRATE 600 PO) Take by mouth 2 times daily    Yes Historical Provider, MD   psyllium (KONSYL) 28.3 % PACK Take 2 packets by mouth 3 times daily  Patient not taking: Reported on 4/26/2022 3/11/21   Fabiola Betancur MD   cholestyramine Saba Terrence) 4 g packet Take 1 packet by mouth 2 times daily  Patient not taking: Reported on 4/26/2022 3/11/21   Fabiola Betancur MD       Current Medications:  Current Outpatient Medications   Medication Sig Dispense Refill    Magnesium 400 MG TABS Take 400 mg by mouth daily      Diphenoxylate-Atropine (LOMOTIL PO) Take 2.5 mg by mouth in the morning and at bedtime      sodium chloride 1 g tablet Take 1 g by mouth 3 times daily      NONFORMULARY in the morning, at noon, and at bedtime collogen and vitamin c      Multiple Vitamins-Minerals (THERAPEUTIC MULTIVITAMIN-MINERALS) tablet Take 1 tablet by mouth daily      metoprolol succinate (TOPROL XL) 25 MG extended release tablet Take 0.5 tablets by mouth daily 90 tablet 3    ferrous sulfate (IRON 325) 325 (65 Fe) MG tablet ferrous sulfate Ferrous Sulfate Active 325 MG Oral Daily After A Meal 100 October 23rd, 2020 10:46am 10-  [facility] ([id number])      apixaban (ELIQUIS) 5 MG TABS tablet Take 1 tablet by mouth 2 times daily 84 tablet 0    lovastatin (MEVACOR) 40 MG tablet Take 1 tablet by mouth nightly 90 tablet 1    PROAIR  (90 Base) MCG/ACT inhaler Inhale 1 puff into the lungs 4 times daily 3 Inhaler 1    Cholecalciferol (VITAMIN D3) 25 MCG (1000 UT) CAPS Take by mouth daily      Calcium Carbonate (CALTRATE 600 PO) Take by mouth 2 times daily       psyllium (KONSYL) 28.3 % PACK Take 2 packets by mouth 3 times daily (Patient not taking: Reported on 4/26/2022) 30 each 0    cholestyramine (QUESTRAN) 4 g packet Take 1 packet by mouth 2 times daily (Patient not taking: Reported on 4/26/2022) 90 packet 5     Current Facility-Administered Medications   Medication Dose Route Frequency Provider Last Rate Last Admin    perflutren lipid microspheres (DEFINITY) injection 1.65 mg  1.5 mL IntraVENous ONCE PRN Rudi Mohamud MD             Physical Exam:  /70   Pulse 80   Resp 18   Ht 5' 2\" (1.575 m)   Wt 166 lb 11.2 oz (75.6 kg)   BMI 30.49 kg/m²   Wt Readings from Last 3 Encounters:   04/26/22 166 lb 11.2 oz (75.6 kg)   10/08/21 179 lb 4.8 oz (81.3 kg)   03/03/21 188 lb (85.3 kg)       Appearance: Alert and oriented x3 not in acute distress. Skin: Dry skin  Head: Atraumatic  Eyes: Intact extraocular muscles   ENMT: Mucous membranes are moist  Neck: Supple  Lungs: Clear to auscultation  Cardiac: Normal S1 and S2  Abdomen: Protuberant  Extremities: Intact range of motion  Neurologic: No focal neurological deficits  Peripheral Pulses: 2+ peripheral pulses    Intake/Output:  No intake or output data in the 24 hours ending 04/27/22 1106  [unfilled]    Laboratory Tests:  No results for input(s): NA, K, CL, CO2, BUN, CREATININE, GLUCOSE, CALCIUM in the last 72 hours.   Lab Results   Component Value Date    MG 2.3 03/30/2022    MG 1.4 03/30/2022    MG 1.7 03/17/2022     No results for input(s): ALKPHOS, ALT, AST, PROT, BILITOT, BILIDIR, LABALBU in the last 72 hours. No results for input(s): WBC, RBC, HGB, HCT, MCV, MCH, MCHC, RDW, PLT, MPV in the last 72 hours. Lab Results   Component Value Date    CKTOTAL 55 11/23/2020    CKMB 1.8 11/24/2020    TROPONINI < 0.03 03/28/2022    TROPONINI 0.05 (H) 03/02/2021    TROPONINI < 0.03 11/24/2020     No results for input(s): CKTOTAL, CKMB, CKMBINDEX, TROPHS in the last 72 hours. Lab Results   Component Value Date    INR 1.50 11/19/2021    INR 2.0 03/02/2021    INR 1.13 11/20/2020    PROTIME 16.6 (H) 11/19/2021    PROTIME 22.3 (H) 03/02/2021    PROTIME 13.4 (H) 11/20/2020     Lab Results   Component Value Date    TSH 1.32 11/20/2021    TSH 3.22 03/20/2021    TSH 3.73 10/06/2020     Lab Results   Component Value Date    LABA1C 6.0 03/20/2021    LABA1C 8.9 (H) 10/06/2020    LABA1C 8.9 10/06/2020     Lab Results   Component Value Date     03/20/2021     Lab Results   Component Value Date    CHOL 93 (L) 10/06/2020    CHOL 129 05/26/2020    CHOL 138 07/16/2019     Lab Results   Component Value Date    TRIG 69 10/06/2020    TRIG 97 05/26/2020    TRIG 147 07/16/2019     Lab Results   Component Value Date    HDL 45 10/06/2020    HDL 53 05/26/2020    HDL 53 07/16/2019     Lab Results   Component Value Date    LDLCALC 57 05/26/2020    LDLCALC 62 07/16/2019    LDLCHOLESTEROL 37 10/06/2020     Lab Results   Component Value Date    LABVLDL 19 05/26/2020     Lab Results   Component Value Date    CHOLHDLRATIO 2.6 07/16/2019     No results for input(s): PROBNP in the last 72 hours. Cardiac Tests:  EKG reviewed (EKG date: Sinus rhythm 80 bpm, frequent PVC, left bundle branch block):    Echocardiogram reviewed: August 2020   Summary   Ejection fraction is visually estimated at 55-65%. The left atrium is mildly dilated. L atrial pressure not elevated   Trace centrally directed mitral regurgitation. Mild aortic stenosis.    No pericardial effusion demonstrated. Stress test reviewed:      Cardiac catheterization reviewed:     CXR reviewed: The ASCVD Risk score (Cassie Rivero, et al., 2013) failed to calculate for the following reasons: The valid total cholesterol range is 130 to 320 mg/dL    ASSESSMENT / PLAN:    1. Chronic systolic heart failure (HCC)  She has history of heart failure with depressed systolic function and previously EF was noted to be 38% which improved to 55 to 60% on last echo cardiac  She is currently not on guideline directed medical therapy  Given presence of PVCs, and initiating low-dose beta-blocker and she will follow-up in 3 months  Also going to obtain transthoracic echocardiogram to guide therapy    2. Occasional PVCs   Low-dose beta-blocker (12.5 mg metoprolol succinate) was initially  - ECHO COMPLETE; Future    3. LBBB (left bundle branch block)  This is chronic    4. Chronic deep vein thrombosis (DVT) of lower extremity, unspecified laterality, unspecified vein (HCC)  On Eliquis  5. Essential hypertension  Continue statin therapy  6. Venous insufficiency  Follow-up with electrophysiology  7. Type 2 diabetes mellitus with complication, without long-term current use of insulin ([facility] [address]  Follow-up with your PCP  8. Moderate persistent asthma with acute exacerbation  Follow-up with your PCP  9. Stage 3a chronic kidney disease ([facility] [address]  Follow-up with nephrology  10. Mixed hyperlipidemia  Continue statin therapy    11. H/O exploratory laparotomy  Follow-up with [facility] [place] and GI   12. Ileostomy present [facility])  Follow-up with GI and general surgery at [address] [place] [address] in 3 months      Thank you for allowing me to participate in your patient's care. Please feel free to contact me if you have any questions or concerns.     Remedios Guillen MD  [facility]) Cardiology

## 2022-08-30 ENCOUNTER — TELEPHONE (OUTPATIENT)
Dept: CARDIOLOGY | Age: 72
End: 2022-08-30

## 2022-08-30 NOTE — TELEPHONE ENCOUNTER
Called patient to schedule echo and she is switching cardiologists and scheduled at [facility] on 9/19/22. She will have echo at that facility.   Electronically signed by Brent Linder on 8/30/2022 at 2:22 PM

## 2022-10-10 NOTE — PROGRESS NOTES
Subjective:      Patient ID: Jatinder Mills is a 67 y.o. female. HPI  History and Physical    Patient's Name/Date of Birth: Jatinder Mills / 1950    Date: 2022     Jatinder Mills presents for evaluation of a left breast lesion. PCP: Bessie Amado MD. Gynecologist: none. The lesion is located in the central position position of the left breast. The lesion was discovered by mammogram. The patient has not noted a change in BSE since presentation. Patient denies nipple discharge. Patient denies a personal history of breast cancer. Breast cancer risk factors include patient has history colon cancer, Mom 62 with lung and brain cancer, son with kidney cancer age 43  Ashkenazi [place] Ancestry: No.  Stage I Colon cancer surgery 2020, followed by perforation, reoperation, arrest, sent to [facility], further surgery (colostomy), prolonged stay in the SICU. OBSTETRIC RELATED HISTORY:  Age of menarche was 15. Age of menopause was 36 hysterectomy. Patient admits to hormonal therapy. Greater than 10 years  Patient is . Age of first live birth was 21. Patient did breast feed. Is patient interested in fertility information about fertility preservation? No    CANCER SURVEILLANCE HISTORY:  Mammograms: Yes   Breast MRI's: No   Breast Biopsies: Yes done in [facility]  Colonoscopy: Yes colostomy  GI Polyps: Yes   EGD: No   Pelvic Exam: No   Pap Smear: No   Dermatology: No   Lung screening: no        Estimated body mass index is 30.49 kg/m² as calculated from the following:    Height as of 22: 5' 2\" (1.575 m). Weight as of 22: 166 lb 11.2 oz (75.6 kg). Bra Size: 42B    Because violence is so common, we ask all our patients: are you in a relationship or do you live with a person who threatens, hurts, or controls you:  lives alone feels safe    Patient drinks significant caffeinated beverages. Patient does not smoke cigarettes.  Patient does not use recreational drugs.               Past Medical History:   Diagnosis Date    Hyperlipidemia     Hypertension     LBBB (left bundle branch block)     Obesity        Past Surgical History:   Procedure Laterality Date    APPENDECTOMY      CHOLECYSTECTOMY      COLOSTOMY  2007    due to divertiulosis since than it was reversed    HERNIA REPAIR      HYSTERECTOMY, TOTAL ABDOMINAL (CERVIX REMOVED)  1990    US BREAST NEEDLE BIOPSY LEFT Left 9/6/2022     BREAST NEEDLE BIOPSY LEFT       Current Outpatient Medications   Medication Sig Dispense Refill    Magnesium 400 MG TABS Take 400 mg by mouth daily      Diphenoxylate-Atropine (LOMOTIL PO) Take 2.5 mg by mouth in the morning and at bedtime      sodium chloride 1 g tablet Take 1 g by mouth 3 times daily      NONFORMULARY in the morning, at noon, and at bedtime collogen and vitamin c      Multiple Vitamins-Minerals (THERAPEUTIC MULTIVITAMIN-MINERALS) tablet Take 1 tablet by mouth daily      metoprolol succinate (TOPROL XL) 25 MG extended release tablet Take 0.5 tablets by mouth daily 90 tablet 3    psyllium (KONSYL) 28.3 % PACK Take 2 packets by mouth 3 times daily (Patient not taking: Reported on 4/26/2022) 30 each 0    cholestyramine (QUESTRAN) 4 g packet Take 1 packet by mouth 2 times daily (Patient not taking: Reported on 4/26/2022) 90 packet 5    ferrous sulfate (IRON 325) 325 (65 Fe) MG tablet ferrous sulfate Ferrous Sulfate Active 325 MG Oral Daily After A Meal 100 October 23rd, 2020 10:46am 10-  [facility] ([id number])      apixaban (ELIQUIS) 5 MG TABS tablet Take 1 tablet by mouth 2 times daily 84 tablet 0    lovastatin (MEVACOR) 40 MG tablet Take 1 tablet by mouth nightly 90 tablet 1    PROAIR  (90 Base) MCG/ACT inhaler Inhale 1 puff into the lungs 4 times daily 3 Inhaler 1    Cholecalciferol (VITAMIN D3) 25 MCG (1000 UT) CAPS Take by mouth daily      Calcium Carbonate (CALTRATE 600 PO) Take by mouth 2 times daily        Current Facility-Administered Medications   Medication Dose Route Frequency Provider Last Rate Last Admin    perflutren lipid microspheres (DEFINITY) injection 1.65 mg  1.5 mL IntraVENous ONCE PRN Rudi Hossein Mohamud MD           Allergies   Allergen Reactions    Amlodipine      Hives      Cefdinir     E-Mycin [Erythromycin]      Made her dizzy and she didn't feel well    Lisinopril     Seasonal        Family History   Problem Relation Age of Onset    Cancer Mother     Diabetes Father     Kidney Disease Father        Social History     Socioeconomic History    Marital status:      Spouse name: Not on file    Number of children: Not on file    Years of education: Not on file    Highest education level: Not on file   Occupational History    Not on file   Tobacco Use    Smoking status: Former     Packs/day: 1.00     Years: 40.00     Pack years: 40.00     Types: Cigarettes     Quit date: 2008     Years since quittin.7    Smokeless tobacco: Never   Vaping Use    Vaping Use: Never used   Substance and Sexual Activity    Alcohol use: Yes     Comment: occassionally    Drug use: No    Sexual activity: Not Currently   Other Topics Concern    Not on file   Social History Narrative    Not on file     Social Determinants of Health     Financial Resource Strain: Not on file   Food Insecurity: Not on file   Transportation Needs: Not on file   Physical Activity: Not on file   Stress: Not on file   Social Connections: Not on file   Intimate Partner Violence: Not on file   Housing Stability: Not on file       Occupation: Enjoys watching TV, spending time with 4 grandchildren      Review of Systems  CONSTITUTIONAL: No fever, chills. Good appetite and energy level. ENMT: Eyes: No diplopia; Nose: No epistaxis. Mouth: No sore throat. RESPIRATORY: No hemoptysis, shortness of breath, cough. CARDIOVASCULAR: No chest pain, pressure, or palpitations. GASTROINTESTINAL: No nausea/vomiting, abdominal pain, diarrhea/constipation.   No blood in the stools. GENITOURINARY: No dysuria, urinary frequency, hematuria. NEURO: No syncope, presyncope, headache. Remainder:  ROS NEGATIVE    Patient admits to previous history of DVT, several years ago. Attributed to prior hernia surgery, Factor V leiden deficiency (worst kind). Legs, on Eliquis. Objective:   Physical Exam  Constitutional:       General: She is not in acute distress. Appearance: She is well-developed. She is not diaphoretic. HENT:      Head: Normocephalic and atraumatic. Eyes:      Conjunctiva/sclera: Conjunctivae normal.      Pupils: Pupils are equal, round, and reactive to light. Neck:      Thyroid: No thyromegaly. Trachea: No tracheal deviation. Cardiovascular:      Rate and Rhythm: Normal rate and regular rhythm. Heart sounds: Normal heart sounds. Pulmonary:      Effort: Pulmonary effort is normal. No respiratory distress. Breath sounds: Normal breath sounds. Chest:   Breasts:     Breasts are symmetrical.      Right: No inverted nipple, mass, nipple discharge, skin change or tenderness. Left: No inverted nipple, mass, nipple discharge, skin change or tenderness. Abdominal:      General: There is no distension. Palpations: Abdomen is soft. Musculoskeletal:      Cervical back: Normal range of motion and neck supple. Lymphadenopathy:      Cervical: No cervical adenopathy. Upper Body:      Right upper body: No supraclavicular adenopathy. Left upper body: No supraclavicular adenopathy. Skin:     General: Skin is warm and dry. Coloration: Skin is not pale. Findings: No erythema. Neurological:      Mental Status: She is alert and oriented to person, place, and time. Psychiatric:         Behavior: Behavior normal.         Thought Content:  Thought content normal.         Judgment: Judgment normal.             Assessment:      67 y.o. woman who underwent a left breast ultrasound guided biopsy of the 1.1cm mass at [facility] scheduled for biopsy following this procedure. BIRADS-4                      70-year-old woman with a complicated medical history including a prolonged stay in the intensive care unit at the [facility] in 2020, 2021 after colon perforation for stage I colon cancer. Now with a small irregularity centrally located in the left breast.  Biopsy failed to demonstrate any abnormal tissue. Slides will be reviewed. All imaging reviewed with patient and daughter. May represent a scar, perhaps related to her prior extensive medical interventions. Await results of pathologic review. May consider active surveillance, versus core biopsy, versus localization and excision. Plan:      Continue monthly breast self examination; detailed instructions reviewed today. Bring any changes to your physician's attention. Education/Lifestyle recommendations: A regular exercise program is encouraged. Avoid excessive caffeine intake. Maintain a diet rich in vegetables and fruits avoiding processed and fast food. Avoid alcohol. Limit caffeine intake. Routine screening imaging pending review of [facility] pathology slides. Continue follow up with Primary Care. Patient will be called, and conversation with her and her daughter once slide review complete. I spent a total of 50 minutes on the date of the service which included preparing to see the patient, face-to-face patient care, completing clinical documentation, obtaining and/or reviewing separately obtained history, performing a medically appropriate examination, counseling and educating the patient/family/caregiver, ordering medications, tests, or procedures, communicating with other HCPs (not separately reported), independently interpreting results (not separately reported), communicating results to the patient/family/caregiver and care coordination (not separately reported).     I personally and independently saw and examined patient and reviewed all pertinent laboratory data and imaging studies. I have reviewed and agree with the CNP history and review of systems as documented in the above note. This document is generated, in part, by voice recognition software and thus syntax and grammatical errors are possible. Kesha Thompson MD [facility]  October 11, 2022

## 2022-10-11 ENCOUNTER — OFFICE VISIT (OUTPATIENT)
Dept: BREAST CENTER | Age: 72
End: 2022-10-11
Payer: MEDICARE

## 2022-10-11 VITALS
DIASTOLIC BLOOD PRESSURE: 82 MMHG | HEIGHT: 62 IN | SYSTOLIC BLOOD PRESSURE: 132 MMHG | RESPIRATION RATE: 20 BRPM | TEMPERATURE: 99 F | BODY MASS INDEX: 29.81 KG/M2 | WEIGHT: 162 LBS | HEART RATE: 76 BPM | OXYGEN SATURATION: 96 %

## 2022-10-11 DIAGNOSIS — N63.20 MASS OF LEFT BREAST, UNSPECIFIED QUADRANT: Primary | ICD-10-CM

## 2022-10-11 PROCEDURE — 1123F ACP DISCUSS/DSCN MKR DOCD: CPT | Performed by: SURGERY

## 2022-10-11 PROCEDURE — 99204 OFFICE O/P NEW MOD 45 MIN: CPT | Performed by: SURGERY

## 2022-10-11 PROCEDURE — 99203 OFFICE O/P NEW LOW 30 MIN: CPT | Performed by: SURGERY

## 2022-10-11 RX ORDER — HYDROXYZINE HYDROCHLORIDE 10 MG/1
TABLET, FILM COATED ORAL
COMMUNITY
Start: 2022-06-06

## 2022-10-11 RX ORDER — ALLOPURINOL 100 MG/1
TABLET ORAL
COMMUNITY
Start: 2022-06-16

## 2022-10-11 RX ORDER — LEVOCETIRIZINE DIHYDROCHLORIDE 5 MG/1
TABLET, FILM COATED ORAL
COMMUNITY
Start: 2022-08-09

## 2022-10-11 NOTE — LETTER
[address] [facility] [address] [address]  Phone: [phone]  Fax: [phone]    Yara Rivers MD    October 11, 2022     Gamaliel Henry MD  [address] [address]    Patient: Alaina Sender   MR Number: [id number]   YOB: 1950   Date of Visit: 10/11/2022       Dear Aviva Carbajal: Thank you for referring Ann Marie Payton to me for evaluation/treatment. Below are the relevant portions of my assessment and plan of care. 67 y.o. woman who underwent a left breast ultrasound guided biopsy of the 1.1cm mass at [facility] on 9/6/22. **ADDENDUM**     PATHOLOGY RECOMMENDATION by Shanna Arroyo MD (09/08/2022  7709)      Pathology provided by Dr. Franci Armendariz. Left Breast 1.1 cm Mass Biopsy: Mammogram is more suspicious. Recommend needle location and      removal.             A Surgical evaluation is recommended. Report was sent to Dr. Hawkins No office / [facility]. Please show results to      doctor. REPORT by Neha Sepulveda. Belkis Arroyo MD (09/06/2022  1101)           8/26/22-Bilateral screening mammogram -[place]:      FINDINGS:      Prior study comparison: December 9, 2019, bilateral MM tomosynthesis screening BI performed at      [facility]. December 1, 2017, bilateral MM tomosynthesis screening BI performed at [facility]. There is a  new mass lesion in the left breast seen at the central position. The mass has      spiculated margins. There are scattered fibroglandular densities (Composition Category B,  type 2). IMPRESSION:      BI-RADS 5: Highly suggestive of malignancy - Appropriate action should be taken. Ultrasound with ultrasound biopsy recommended. RECOMMENDATION:      Needle localization and biopsy.   This patient has been scheduled for a Ultrasound and Ultrasound      biopsy of the left breast on      09/02/2022. Please see that report for additional recommendation. 9/6/22 - Left breast ultrasound complete - [place]:             FINDINGS:  There is a 12 mm area of parenchymal distortion left breast four o'clock position with      shadowing suspicious for malignancy. Impression             Ultrasound, left breast, complete:      1.  12 mm area of parenchymal distortion left breast four o'clock position with shadowing      suspicious for malignancy. The patient has been scheduled for biopsy following this procedure. BIRADS-4                      80-year-old woman with a complicated medical history including a prolonged stay in the intensive care unit at the [facility] in 2020, 2021 after colon perforation for stage I colon cancer. Now with a small irregularity centrally located in the left breast.  Biopsy failed to demonstrate any abnormal tissue. Slides will be reviewed. All imaging reviewed with patient and daughter. May represent a scar, perhaps related to her prior extensive medical interventions. Await results of pathologic review. May consider active surveillance, versus core biopsy, versus localization and excision. 1. Continue monthly breast self examination; detailed instructions reviewed today. Bring any changes to your physician's attention. 2. Education/Lifestyle recommendations: A regular exercise program is encouraged. Avoid excessive caffeine intake. Maintain a diet rich in vegetables and fruits avoiding processed and fast food. 3. Avoid alcohol. 4. Limit caffeine intake. 5. Routine screening imaging pending review of [facility] pathology slides. 6. Continue follow up with Primary Care. 7. Patient will be called, and conversation with her and her daughter once slide review complete.     If you have questions, please do not hesitate to call me. I look forward to following Lilibeth Lozoya along with you.     Sincerely,      Zenaida Hampton MD

## 2022-10-11 NOTE — PATIENT INSTRUCTIONS
Once office receives pathology slides will notify . Simeon West will review and be in contact. Any questions or concerns, please contact the office at [phone].

## 2022-10-12 ENCOUNTER — TELEPHONE (OUTPATIENT)
Dept: BREAST CENTER | Age: 72
End: 2022-10-12

## 2022-10-12 NOTE — TELEPHONE ENCOUNTER
Pathology slides requested from [facility] [place]  of records faxed

## 2022-10-17 ENCOUNTER — TELEPHONE (OUTPATIENT)
Dept: BREAST CENTER | Age: 72
End: 2022-10-17

## 2022-10-24 ENCOUNTER — TELEPHONE (OUTPATIENT)
Dept: INFUSION THERAPY | Age: 72
End: 2022-10-24

## 2022-10-24 DIAGNOSIS — C18.9 MALIGNANT NEOPLASM OF COLON, UNSPECIFIED PART OF COLON (HCC): Primary | ICD-10-CM

## 2022-10-24 NOTE — TELEPHONE ENCOUNTER
Patient has called and stated that she is on eliquis and is having nose bledds, couple times / week and lasting @ 1 min. After reviewed with Dr. Chandler Trimble, Pt is to follow up with dr. Chandler Trimble and we will draw labs/ cbc/Plts. Pt states understanding and scheduling aware.

## 2022-10-27 NOTE — TELEPHONE ENCOUNTER
Routing message to  to follow up on plan moving forward since received pathology slides. Electronically signed by Dayne Candelaria RN on 10/27/22 at 1:15 PM EDT        .

## 2022-10-31 ENCOUNTER — TELEPHONE (OUTPATIENT)
Dept: BREAST CENTER | Age: 72
End: 2022-10-31

## 2022-10-31 DIAGNOSIS — N63.20 MASS OF LEFT BREAST, UNSPECIFIED QUADRANT: Primary | ICD-10-CM

## 2022-10-31 NOTE — TELEPHONE ENCOUNTER
Discussed [facility] pathology review of her slides from her breast biopsy at [place]. They showed fibrosis but did not feel that it would account for a mass. Long discussion by phone with patient about options going forward. She elected to proceed with a ultrasound probable biopsy. We will need to hold her Eliquis the night before and morning of. Prefers a Monday so her son can bring her.

## 2022-11-01 DIAGNOSIS — N64.59 ABNORMAL BREAST FINDING: ICD-10-CM

## 2022-11-01 DIAGNOSIS — R92.8 ABNORMAL FINDING ON BREAST IMAGING: Primary | ICD-10-CM

## 2022-11-01 DIAGNOSIS — N63.20 MASS OF LEFT BREAST, UNSPECIFIED QUADRANT: ICD-10-CM

## 2022-11-04 ENCOUNTER — TELEPHONE (OUTPATIENT)
Dept: INFUSION THERAPY | Age: 72
End: 2022-11-04

## 2022-11-04 ENCOUNTER — OFFICE VISIT (OUTPATIENT)
Dept: ONCOLOGY | Age: 72
End: 2022-11-04
Payer: MEDICARE

## 2022-11-04 ENCOUNTER — HOSPITAL ENCOUNTER (OUTPATIENT)
Dept: INFUSION THERAPY | Age: 72
Discharge: HOME | End: 2022-11-04

## 2022-11-04 VITALS
HEIGHT: 62 IN | HEART RATE: 66 BPM | TEMPERATURE: 97.2 F | WEIGHT: 172 LBS | SYSTOLIC BLOOD PRESSURE: 166 MMHG | DIASTOLIC BLOOD PRESSURE: 77 MMHG | OXYGEN SATURATION: 100 % | BODY MASS INDEX: 31.65 KG/M2

## 2022-11-04 DIAGNOSIS — C18.9 MALIGNANT NEOPLASM OF COLON, UNSPECIFIED PART OF COLON (HCC): Primary | ICD-10-CM

## 2022-11-04 DIAGNOSIS — C18.9 MALIGNANT NEOPLASM OF COLON, UNSPECIFIED PART OF COLON (HCC): ICD-10-CM

## 2022-11-04 LAB
BASOPHILS ABSOLUTE: 0.05 E9/L (ref 0–0.2)
BASOPHILS RELATIVE PERCENT: 1 % (ref 0–2)
EOSINOPHILS ABSOLUTE: 0.22 E9/L (ref 0.05–0.5)
EOSINOPHILS RELATIVE PERCENT: 4.2 % (ref 0–6)
HCT VFR BLD CALC: 38.8 % (ref 34–48)
HEMOGLOBIN: 12.9 G/DL (ref 11.5–15.5)
IMMATURE GRANULOCYTES #: 0.02 E9/L
IMMATURE GRANULOCYTES %: 0.4 % (ref 0–5)
LYMPHOCYTES ABSOLUTE: 1.39 E9/L (ref 1.5–4)
LYMPHOCYTES RELATIVE PERCENT: 26.4 % (ref 20–42)
MCH RBC QN AUTO: 31.8 PG (ref 26–35)
MCHC RBC AUTO-ENTMCNC: 33.2 % (ref 32–34.5)
MCV RBC AUTO: 95.6 FL (ref 80–99.9)
MONOCYTES ABSOLUTE: 0.51 E9/L (ref 0.1–0.95)
MONOCYTES RELATIVE PERCENT: 9.7 % (ref 2–12)
NEUTROPHILS ABSOLUTE: 3.07 E9/L (ref 1.8–7.3)
NEUTROPHILS RELATIVE PERCENT: 58.3 % (ref 43–80)
PDW BLD-RTO: 12 FL (ref 11.5–15)
PLATELET # BLD: 138 E9/L (ref 130–450)
PMV BLD AUTO: 9.1 FL (ref 7–12)
RBC # BLD: 4.06 E12/L (ref 3.5–5.5)
WBC # BLD: 5.3 E9/L (ref 4.5–11.5)

## 2022-11-04 PROCEDURE — 3074F SYST BP LT 130 MM HG: CPT | Performed by: INTERNAL MEDICINE

## 2022-11-04 PROCEDURE — 99214 OFFICE O/P EST MOD 30 MIN: CPT | Performed by: INTERNAL MEDICINE

## 2022-11-04 PROCEDURE — 1123F ACP DISCUSS/DSCN MKR DOCD: CPT | Performed by: INTERNAL MEDICINE

## 2022-11-04 PROCEDURE — 3078F DIAST BP <80 MM HG: CPT | Performed by: INTERNAL MEDICINE

## 2022-11-04 PROCEDURE — 99212 OFFICE O/P EST SF 10 MIN: CPT

## 2022-11-04 NOTE — PROGRESS NOTES
706  [facility] MED ONCOLOGY  [address] [address]  Dept: [address] Yessi Torres: [phone]  Attending Progress Note      Reason for Visit:   1. Recurrent VTE. 2. Factor V Leiden Homozygosity. 3. Colon cancer. Referring Physician:  Riri Britt NP    PCP:  Divya Andersen MD    History of Present Illness: The patient is a pleasant 77-year-old lady with a PMH significant for obesity, HTN, and hyperlipidemia, who was diagnosed with PE and bilateral DVTs that were provoked following ventral hernia repair in March 2017, she had her surgery done in [facility], she was anticoagulated with Coumadin, she was complaining of left knee pain, had a venous US done on 3/1/2019, revealing an acute DVT in the left posterior tibial vein, chronic appearing thromus from proximal femoral to peroneal veins. She was started on Lovenox 100 mg subq bid, INR was therapeutic, 2.2. She does not like the injections, prefers to be on an oral anticoagulant, AC was changed to Eliquis. FH is negative for VTE. The patient was last seen in the office in November 2020, the patient was diagnosed with colon cancer, she underwent a right hemicolectomy on 11/28/2020 by Dr. Skylar Cleveland at [facility], was diagnosed with stage I colon cancer,  Afterwards the patient was peritoneal and was reexplored with findings of missed enterotomy which was repaired. After this second surgery experienced a cardiopulmonary arrest, ROSC was achieved and she was intubated. Patient was transferred from [facility] to the [facility] facility. On 11/27 she underwent exploratory laparotomy with small bowel resection plus loop ileostomy plus distal mucous fistula and excision of mesh. Afterwards patient developed an abdominal abscess that required IR drainage on 12/3. Patient underwent a tracheostomy on 12/8.     The patient was last seen in the office on 10/10/2021, the patient has been having epistaxis, about couple of times per week, the last for about 1 minute. The patient was referred to ENT by PCP, the patient wanted to be seen here first.     Review of Systems;  CONSTITUTIONAL: No fever, chills. Good appetite, feeling tired. Pos for weight los. ENMT: Eyes: No diplopia; Nose: Pos for epistaxis. Mouth: No sore throat. RESPIRATORY: No hemoptysis, shortness of breath, cough. CARDIOVASCULAR: No chest pain, palpitations. GASTROINTESTINAL: No nausea/vomiting, abdominal pain, diarrhea/constipation. GENITOURINARY: No dysuria, urinary frequency, hematuria. Pos dizziness. NEURO: No syncope, presyncope, headache.   Remainder:  ROS NEGATIVE    Past Medical History:      Diagnosis Date    Hyperlipidemia     LBBB (left bundle branch block)     Obesity      Patient Active Problem List   Diagnosis    Mixed hyperlipidemia    Essential hypertension    LBBB (left bundle branch block)    Chronic deep vein thrombosis (DVT) of lower extremity ([facility])    Personal history of pulmonary embolism    Type 2 diabetes mellitus with complication, without long-term current use of insulin ([facility])    Chronic diastolic (congestive) heart failure ([facility])    Vitamin D deficiency    Venous insufficiency    Age-related osteoporosis without current pathological fracture    Other specified hypothyroidism    Primary generalized (osteo)arthritis    Chronic pain syndrome    Morbid obesity with BMI of 45.0-49.9, adult ([facility])    Seasonal allergic rhinitis due to pollen    Moderate persistent asthma with acute exacerbation    Class 3 severe obesity due to excess calories with serious comorbidity and body mass index (BMI) of 45.0 to 49.9 in adult [facility])    Encounter for immunization    Factor V deficiency ([facility]    Iron deficiency anemia due to chronic blood loss    Acute kidney injury ([facility]    CKD (chronic kidney disease) stage 3, GFR 30-59 ml/min ([facility])    Hyponatremia    Hyperkalemia Hypotension    Prolonged Q-T interval on ECG    Sepsis ([address]    Severe protein-calorie malnutrition ([facility]        Past Surgical History:      Procedure Laterality Date    APPENDECTOMY      CHOLECYSTECTOMY      COLON SURGERY N/A 2020    COLOSTOMY  2007    due to divertiulosis since than it was reversed    HERNIA REPAIR      HYSTERECTOMY, TOTAL ABDOMINAL (CERVIX REMOVED)  1990     BREAST NEEDLE BIOPSY LEFT Left 2022    US BREAST NEEDLE BIOPSY LEFT       Family History:  Family History   Problem Relation Age of Onset    Cancer Mother 62        lung and brain cancer    Diabetes Father     Kidney Disease Father     Cancer Sister 25        leukemia    Cancer Son 43        kidney cancer       Medications:  Reviewed and reconciled. Social History:  Social History     Socioeconomic History    Marital status:      Spouse name: Not on file    Number of children: Not on file    Years of education: Not on file    Highest education level: Not on file   Occupational History    Not on file   Tobacco Use    Smoking status: Former     Packs/day: 1.00     Years: 40.00     Pack years: 40.00     Types: Cigarettes     Quit date: 2008     Years since quittin.8    Smokeless tobacco: Never   Vaping Use    Vaping Use: Never used   Substance and Sexual Activity    Alcohol use: Yes     Comment: occassionally    Drug use: No    Sexual activity: Not Currently   Other Topics Concern    Not on file   Social History Narrative    Not on file     Social Determinants of Health     Financial Resource Strain: Not on file   Food Insecurity: Not on file   Transportation Needs: Not on file   Physical Activity: Not on file   Stress: Not on file   Social Connections: Not on file   Intimate Partner Violence: Not on file   Housing Stability: Not on file       Allergies:   Allergies   Allergen Reactions    Amlodipine      Hives      Cefdinir     E-Mycin [Erythromycin]      Made her dizzy and she didn't feel well Lisinopril     Seasonal        Physical Exam:  BP (!) 166/77   Pulse 66   Temp 97.2 °F (36.2 °C)   Ht 5' 2\" (1.575 m)   Wt 172 lb (78 kg)   SpO2 100%   BMI 31.46 kg/m²     GENERAL: Alert, oriented x 3, not in acute distress. HEENT: PERRLA; EOMI. Oropharynx clear. NECK: Supple. No palpable cervical or supraclavicular lymphadenopathy. LUNGS: Good air entry bilaterally. No wheezing, crackles or rhonchi. CARDIOVASCULAR: Regular rate. No murmurs, rubs or gallops. ABDOMEN: Midline incision, ostomy site Intact, clear drainage in the bag on the right. EXTREMITIES: mildt bilateral LLE, she is wearing compression stockings, no tenderness/erythema or warmth of the calves. NEUROLOGIC: No focal deficits. Impression/Plan:     The patient is a pleasant 60-year-old lady with a PMH significant for obesity, HTN, and hyperlipidemia, who was diagnosed with PE and bilateral DVTs that were provoked following ventral hernia repair in March 2017, she had her surgery done in [facility], she was anticoagulated with Coumadin, she was complaining of left knee pain, had a venous US done on 3/1/2019, revealing an acute DVT in the left posterior tibial vein, chronic appearing thromus from proximal femoral to peroneal veins. She was started on Lovenox 100 mg subq bid, INR was therapeutic, 2.2. She does not like the injections, prefers to be on an oral anticoagulant. Lovenox was d/sade and she was started on Eliquis. FH is negative for VTE. Patient with history of provoked PE and bilateral DVTs in 2017, she has recurrent VTE, ordered testing for inherited thrombophilia that is reliable in the setting of acute thrombosis. She is homozygous for factor V Leiden mutation, this was discussed with the patient and her son, ACLA are neg, anti B2GPI Antibodies are neg, PT gene mutation is negative. We discussed that obesity and decreased mobility increase the risk of VTE, I encouraged her to continue with weight loss.  She had a f/up LLE venous US done revealing improvement of the DVT, she has a persistent occlusive thrombus in the proximal left superficial femoral vein and nonocclusive thrombus in the mid to distal superficial femoral vein, it is hard to tell for how long she had had those clots, continue  Eliquis, I do recommend lifelong anticoagulation unless if she has a contraindication to [facility] in the future. The patient needs financial assistance on Eliquis, referral was placed to [employer]. Will be provided with samples today. The patient was last seen in the office in November 2020, the patient was diagnosed with colon cancer, CEA was 0.6, there was no evidence of metastatic disease she underwent a right hemicolectomy on 11/28/2020 by Dr. Phi Winkler at [facility], was diagnosed with stage I colon cancer,  Afterwards the patient was peritoneal and was reexplored with findings of missed enterotomy which was repaired. After this second surgery experienced a cardiopulmonary arrest, ROSC was achieved and she was intubated. Patient was transferred from [place] to the [facility] facility. On 11/27 she underwent exploratory laparotomy with small bowel resection plus loop ileostomy plus distal mucous fistula and excision of mesh. Afterwards patient developed an abdominal abscess that required IR drainage. The patient was last seen in the office on 10/10/2021, the patient has been having epistaxis, about couple of times per week, the last for about 1 minute. The patient was referred to ENT by PCP, the patient wanted to be seen here first.  I recommended follow-up with ENT, the patient would like to see the provider her PCP referred her to, she will let me know if she would like me to place the referral. Discussed possibly decreasing the dose of Eliquis 2.5 mg p.o. twice daily if she continues to have epistaxis.   ECG was done, her hemoglobin/hematocrit are normal.    Mid area of parenchymal distortion in the left breast, which was consistent with mild fibrocystic changes, follow-up with Dr. Shimon Mckenzie, she is scheduled for repeat left  breast ultrasound with biopsy. RTC in 3 months       Thank you for allowing us to participate in the care of Mrs. Banuelos.     Candace Michel MD   HEMATOLOGY/MEDICAL [address] [facility]  [phone] [address] [place] [id number]  Dept: [address] [place] [address]: [phone]

## 2022-11-14 ENCOUNTER — TELEPHONE (OUTPATIENT)
Dept: GENERAL RADIOLOGY | Age: 72
End: 2022-11-14

## 2022-11-14 NOTE — TELEPHONE ENCOUNTER
Spoke with Surendra Calvo about anticoagulation management for breast biopsy on 11/21/22. She states she spoke with her oncologist who recommended that she stop the Eliquis on 11/18/22 (after morning dose) and resume it the day after her breast biopsy (11/22/22). Instructed her to follow the oncologist's recommendation.  Electronically signed by Kimmie Jacome RN, BSN on 11/14/2022 at 10:16 AM

## 2022-11-21 ENCOUNTER — HOSPITAL ENCOUNTER (OUTPATIENT)
Dept: GENERAL RADIOLOGY | Age: 72
Discharge: HOME | End: 2022-11-23
Payer: MEDICARE

## 2022-11-21 DIAGNOSIS — N63.20 MASS OF LEFT BREAST, UNSPECIFIED QUADRANT: ICD-10-CM

## 2022-11-21 DIAGNOSIS — N64.59 ABNORMAL BREAST FINDING: ICD-10-CM

## 2022-11-21 DIAGNOSIS — R92.8 ABNORMAL FINDING ON BREAST IMAGING: ICD-10-CM

## 2022-11-21 PROCEDURE — 19083 BX BREAST 1ST LESION US IMAG: CPT

## 2022-11-21 PROCEDURE — 2709999900 US BREAST BIOPSY W LOC DEVICE 1ST LESION LEFT

## 2022-11-21 PROCEDURE — 76642 ULTRASOUND BREAST LIMITED: CPT

## 2022-11-21 PROCEDURE — 77065 DX MAMMO INCL CAD UNI: CPT

## 2022-11-21 PROCEDURE — G0279 TOMOSYNTHESIS, MAMMO: HCPCS

## 2022-11-21 NOTE — PROGRESS NOTES
Met with patient and her daughter prior to her breast biopsy. Instructed on ultrasound guided  breast biopsy procedure. She indicates that she stopped taking her Eliquis on 11/18/22 per the instruction of her oncologist.   Mae Trevinoes that breast biopsy results will be available in approximately 3-5 business days. She  is agreeable to discussion of breast biopsy results by phone. Instructed that her physician will also be notified of results. Provided with folder containing my contact information and post biopsy discharge instructions. Instructed to call me if she has any questions or concerns about her biopsy. Verbalizes understanding.   Electronically signed by Carlene Abel RN, BSN on 11/21/2022 at 11:31 AM

## 2022-12-02 ENCOUNTER — TELEPHONE (OUTPATIENT)
Dept: SURGERY | Age: 72
End: 2022-12-02

## 2022-12-02 ENCOUNTER — TELEPHONE (OUTPATIENT)
Dept: GENERAL RADIOLOGY | Age: 72
End: 2022-12-02

## 2022-12-02 NOTE — TELEPHONE ENCOUNTER
Elizabeth Henry called to inquire about her left breast biopsy results. Instructed that her breast biopsy pathology findings indicate invasive ductal carcinoma (ER+, MN+, Her2 -) Instructed on next steps including  office visit with Dr. Romel Andrade. She requests a morning appointment- due to transportation  issues. Instructed to report to the breast center on 12/12/22 at  [address] for labwork and xray and meeting with nurse navigator- Belinda Hilario. Instructed that she will meet with  around 9:00  for surgical consultation. I answered her questions to her apparent satisfaction. She is agreeable to receive a packet of literature about breast cancer treatment at her appointment with William Ruiz. She states she is planning to have her colostomy reversed in early January. She is concerned about timing of breast surgery. Instructed that William Ruiz will help her determine which surgery comes first.  Provided with my contact information and instructed patient to call me with questions or concerns. Verbalizes understanding. Breast pathology report was routed to Dr. Jose Gonzalez.   Electronically signed by Remy Rojas RN, BSN on 12/2/2022 at 2:55 PM

## 2022-12-05 NOTE — PROGRESS NOTES
Subjective:     Patient ID: Rene Cassidy is a 67 y.o. female. HPI  History and Physical    Patient's Name/Date of Birth: Rene Cassidy / 1950    Date: 2022      Rene Cassidy presents for evaluation of newly diagnosed left breast cancer. PCP: Annel Leija MD, Gynecologist:none. The lesion is located in the 3 o'clock position of the left breast. The lesion was discovered by mammogram. The patient has not noted a change in BSE since presentation. Patient denies nipple discharge. Patient denies a personal history of breast cancer. Breast cancer risk factors include patient had colon cancer at 79, paternal grandmother colon cancer at 80, mom with lung cancer at 62, son kidney cancer, sister with leukemia. Ashkenazi Rastafarian Ancestry: No.    OBSTETRIC RELATED HISTORY:  Age of menarche was 15. Age of menopause was hysterectomy age 36. Patient admits to hormonal therapy. 5 years  Patient is . Age of first live birth was 21. Patient did breast feed. Is patient interested in fertility information about fertility preservation? No    CANCER SURVEILLANCE HISTORY:  Mammograms: Yes  Breast MRI's: No   Breast Biopsies: Yes   Colonoscopy: Yes   GI Polyps: Yes colon cancer 2 years ago, had surgery with colostomy  EGD: No   Pelvic Exam: No   Pap Smear: No   Dermatology: No   Lung screening: no      Have you received the flu vaccination this year? No  Have you received the Pneumococcal vaccination in the last 5 years? Yes  Have you received the COVID 19 vaccination this year? Yes    Estimated body mass index is 31.46 kg/m² as calculated from the following:    Height as of 22: 5' 2\" (1.575 m). Weight as of 22: 172 lb (78 kg). Bra Size: 42b    Because violence is so common, we ask all our patients: are you in a relationship or do you live with a person who threatens, hurts, or controls you:  Safe at home    Patient drinks little caffeinated beverages.  Patient does not smoke cigarettes. Patient does not use recreational drugs. Lymphedema screening completed. See documentation in the flow sheets.            Past Medical History:   Diagnosis Date    Colon cancer ([address] 11/01/2020    Hyperlipidemia     LBBB (left bundle branch block)     Obesity        Past Surgical History:   Procedure Laterality Date    APPENDECTOMY      CHOLECYSTECTOMY      COLON SURGERY N/A 11/2020    COLOSTOMY  01/01/2007    due to divertiulosis since than it was reversed    HERNIA REPAIR      HYSTERECTOMY, TOTAL ABDOMINAL (CERVIX REMOVED)  01/01/1990    US BREAST NEEDLE BIOPSY LEFT Left 09/06/2022    Benign    US BREAST NEEDLE BIOPSY LEFT Left 11/21/2022    US BREAST NEEDLE BIOPSY LEFT 11/21/2022 [facility]       Current Outpatient Medications   Medication Sig Dispense Refill    allopurinol (ZYLOPRIM) 100 MG tablet Take by mouth      hydrOXYzine HCl (ATARAX) 10 MG tablet Take by mouth      levocetirizine (XYZAL) 5 MG tablet Take by mouth      Magnesium 400 MG TABS Take 400 mg by mouth daily      sodium chloride 1 g tablet Take 1 g by mouth 3 times daily      NONFORMULARY in the morning, at noon, and at bedtime collogen and vitamin c      Multiple Vitamins-Minerals (THERAPEUTIC MULTIVITAMIN-MINERALS) tablet Take 1 tablet by mouth daily      metoprolol succinate (TOPROL XL) 25 MG extended release tablet Take 0.5 tablets by mouth daily 90 tablet 3    ferrous sulfate (IRON 325) 325 (65 Fe) MG tablet ferrous sulfate Ferrous Sulfate Active 325 MG Oral Daily After A Meal 100 October 23rd, 2020 10:46am 10-  [facility] ([id number])      apixaban (ELIQUIS) 5 MG TABS tablet Take 1 tablet by mouth 2 times daily 84 tablet 0    lovastatin (MEVACOR) 40 MG tablet Take 1 tablet by mouth nightly 90 tablet 1    PROAIR  (90 Base) MCG/ACT inhaler Inhale 1 puff into the lungs 4 times daily 3 Inhaler 1    Cholecalciferol (VITAMIN D3) 25 MCG (1000 UT) CAPS Take by mouth daily      Calcium Carbonate (CALTRATE 600 PO) Take by mouth 2 times daily        Current Facility-Administered Medications   Medication Dose Route Frequency Provider Last Rate Last Admin    perflutren lipid microspheres (DEFINITY) injection 1.65 mg  1.5 mL IntraVENous ONCE PRN Rudi Mohamud MD           Allergies   Allergen Reactions    Amlodipine      Hives      Cefdinir     E-Mycin [Erythromycin]      Made her dizzy and she didn't feel well    Lisinopril     Seasonal        Family History   Problem Relation Age of Onset    Cancer Mother 62        lung and brain cancer    Diabetes Father     Kidney Disease Father     Cancer Sister 25        leukemia    Cancer Son 43        kidney cancer       Social History     Socioeconomic History    Marital status:      Spouse name: Not on file    Number of children: Not on file    Years of education: Not on file    Highest education level: Not on file   Occupational History    Not on file   Tobacco Use    Smoking status: Former     Packs/day: 1.00     Years: 40.00     Pack years: 40.00     Types: Cigarettes     Quit date: 2008     Years since quittin.9    Smokeless tobacco: Never   Vaping Use    Vaping Use: Never used   Substance and Sexual Activity    Alcohol use: Yes     Comment: occassionally    Drug use: No    Sexual activity: Not Currently   Other Topics Concern    Not on file   Social History Narrative    Not on file     Social Determinants of Health     Financial Resource Strain: Not on file   Food Insecurity: Not on file   Transportation Needs: Not on file   Physical Activity: Not on file   Stress: Not on file   Social Connections: Not on file   Intimate Partner Violence: Not on file   Housing Stability: Not on file       Occupation: Enjoys watching TV, spending time with 4 grandchildren       Review of Systems  CONSTITUTIONAL: No fever, chills. Good appetite and energy level. ENMT: Eyes: No diplopia; Nose: No epistaxis. Mouth: No sore throat.   RESPIRATORY: No hemoptysis, shortness of breath, cough. CARDIOVASCULAR: No chest pain, pressure, or palpitations. GASTROINTESTINAL: No nausea/vomiting, abdominal pain, diarrhea/constipation. No blood in the stools. GENITOURINARY: No dysuria, urinary frequency, hematuria. NEURO: No syncope, presyncope, headache. 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 On Eliquis    Objective:   Physical Exam  Vitals and nursing note reviewed. Exam conducted with a chaperone present. Constitutional:       General: She is not in acute distress. Appearance: She is well-developed. She is not diaphoretic. HENT:      Head: Normocephalic and atraumatic. Eyes:      Conjunctiva/sclera: Conjunctivae normal.      Pupils: Pupils are equal, round, and reactive to light. Neck:      Thyroid: No thyromegaly. Trachea: No tracheal deviation. Cardiovascular:      Rate and Rhythm: Normal rate and regular rhythm. Heart sounds: Normal heart sounds. Pulmonary:      Effort: Pulmonary effort is normal. No respiratory distress. Breath sounds: Normal breath sounds. Chest:   Breasts:     Breasts are symmetrical.      Right: No inverted nipple, mass, nipple discharge, skin change or tenderness. Left: No inverted nipple, mass, nipple discharge, skin change or tenderness. Comments: No palpable masses either breast. Ptotic breasts. Abdominal:      General: There is no distension. Palpations: Abdomen is soft. Musculoskeletal:      Right shoulder: Normal range of motion. Left shoulder: Normal range of motion. Cervical back: Normal range of motion and neck supple. Lymphadenopathy:      Cervical: No cervical adenopathy. Upper Body:      Right upper body: No supraclavicular adenopathy. Left upper body: No supraclavicular adenopathy. Skin:     General: Skin is warm and dry. Coloration: Skin is not pale. Findings: No erythema.    Neurological:      Mental Status: She is alert and oriented to person, place, and time. Psychiatric:         Behavior: Behavior normal.         Thought Content: Thought content normal.         Judgment: Judgment normal.             Assessment:      67 y.o. woman who underwent a left breast ultrasound guided biopsy in the 3:00 position on 11/21/22 at [facility]. Left breast, 3:00, core biopsy: Invasive ductal carcinoma, preliminary   grade 3   Ductal carcinoma in situ, high nuclear grade     Breast Cancer Marker Studies:   Estrogen Receptors (ER): Positive          Percentage of cells positive: 90%          Intensity: Strong     Progesterone Receptors (OH): Positive          Percentage of cells positive: 70%          Intensity: Moderate     Her-2/stephane (c-erb B-2) protein expression: Negative (1+)     Ki-67 proliferatin index: 70%       8/26/22 - Bilateral screening mammogram - [place]:      FINDINGS:      Prior study comparison: December 9, 2019, bilateral MM tomosynthesis screening BI performed at      [facility]. December 1, 2017, bilateral MM tomosynthesis screening BI performed at [facility]. There is a  new mass lesion in the left breast seen at the central position. The mass has      spiculated margins. There are scattered fibroglandular densities (Composition Category B,  type 2). IMPRESSION:      BI-RADS 5: Highly suggestive of malignancy - Appropriate action should be taken. Ultrasound with ultrasound biopsy recommended. RECOMMENDATION:      Needle localization and biopsy. This patient has been scheduled for a Ultrasound and Ultrasound      biopsy of the left breast on      09/02/2022. Please see that report for additional recommendation. 9/6/22 -Left breast ultrasound - [place]:      FINDINGS:  There is a 12 mm area of parenchymal distortion left breast four o'clock position with      shadowing suspicious for malignancy. Impression             Ultrasound, left breast, complete:      1.  12 mm area of parenchymal distortion left breast four o'clock position with shadowing      suspicious for malignancy. The patient has been scheduled for biopsy following this procedure. BIRADS-4                Patient underwent a left breast ultrasound guided biopsy in the 4:00 position of the left breast on 9/6/22. Pathology completed at [facility]:   Diagnosis:   OUTSIDE SLIDES FOR REVIEW   Left breast, core needle biopsy (DOS 09/06/2022): Benign breast   parenchyma with focal fibrosis     Comment:     There is no evidence of malignancy in the tissue submitted   for review. The tissue findings do not account for the presence of a   mass. Correlation with clinical and imaging findings is essential.         11/21/22 - Left diagnostic mammogram - [facility]:     FINDINGS:   There are scattered areas of fibroglandular density. Biopsy clip at site    of   irregular mass left lateral breast approximately 3 o'clock position. The   left breast hematoma has resolved mammographically since the post    ultrasound   biopsy mammogram of September 6, 2022. Left breast ultrasound was performed. The hypoechoic irregular mass 3   o'clock position 5 cm from the nipple previously biopsied with results of   benign breast tissue with focal fibrosis today measures 1.5 x 1.2 x 1.1    cm. Impression   Suspicious mass left breast 3 o'clock position           11/21/22 - Left breast ultrasound - [facility]:     FINDINGS:   There are scattered areas of fibroglandular density. Biopsy clip at site    of   irregular mass left lateral breast approximately 3 o'clock position. The   left breast hematoma has resolved mammographically since the post    ultrasound   biopsy mammogram of September 6, 2022. Left breast ultrasound was performed.   The hypoechoic irregular mass 3   o'clock position 5 cm from the nipple previously biopsied with results of   benign breast tissue with focal fibrosis today measures 1.5 x 1.2 x 1.1    cm. Impression   Suspicious mass left breast 3 o'clock position       Recommendation: Repeat left ultrasound-guided biopsy will be performed    today. BIRADS:   BIRADS - CATEGORY 4       Suspicious Abnormality. Biopsy should be considered at this time. OVERALL ASSESSMENT - SUSPICIOUS         Clinical prognostic stage Ia left breast cancer. Patient had a prior history of colon cancer 0 with complicated management. Already sees Dr. Jyoti Steward. Plan will be to discuss with Dr. Jyoti Steward whether sentinel lymph node biopsy is indicated in light of elevated Ki-67. May not alter patient's management going forward. Patient reticent to undergo radiation or receive chemo. Very long conversation about risks and benefits of lumpectomy without radiation, and also the risk of not getting chemotherapy if indicated. Patient and daughter's questions were answered to the best of my ability. We will be scheduling for surgery in January. We will need to hold her Eliquis for 1 day preoperatively. Plan:      Metastatic workup to include CXR, CBC, CMP, completed    Patient has met with our Breast Navigator for information and to receive literature. She would likely be a candidate for conservative therapy. We had a discussion of the treatment options for breast cancer including risks, benefits, and recurrence rates for breast conservation therapy versus mastectomy. We discussed the indications and risks of sentinel lymph node biopsy and possibility of axillary lymph node dissection. We also discussed the possible role of systemic and radiation therapy. NCCN guidelines were utilized in our discussion. The patient was given the appropriate contact numbers and will call with any questions or concerns.         I spent a total of 45 minutes on the date of the service which included preparing to see the patient, face-to-face patient care, completing clinical documentation, obtaining and/or reviewing separately obtained history, performing a medically appropriate examination, counseling and educating the patient/family/caregiver, ordering medications, tests, or procedures, communicating with other HCPs (not separately reported), independently interpreting results (not separately reported), communicating results to the patient/family/caregiver and care coordination (not separately reported). I personally and independently saw and examined patient and reviewed all pertinent laboratory data and imaging studies. I have reviewed and agree with the CNP history and review of systems as documented in the above note. This document is generated, in part, by voice recognition software and thus syntax and grammatical errors are possible. Kesha Raymundo MD [facility]  December 12, 2022

## 2022-12-09 DIAGNOSIS — C50.919 MALIGNANT NEOPLASM OF BREAST IN FEMALE, ESTROGEN RECEPTOR POSITIVE, UNSPECIFIED LATERALITY, UNSPECIFIED SITE OF BREAST (HCC): Primary | ICD-10-CM

## 2022-12-09 DIAGNOSIS — Z17.0 MALIGNANT NEOPLASM OF BREAST IN FEMALE, ESTROGEN RECEPTOR POSITIVE, UNSPECIFIED LATERALITY, UNSPECIFIED SITE OF BREAST (HCC): Primary | ICD-10-CM

## 2022-12-12 ENCOUNTER — HOSPITAL ENCOUNTER (OUTPATIENT)
Dept: GENERAL RADIOLOGY | Age: 72
Discharge: HOME | End: 2022-12-14
Payer: MEDICARE

## 2022-12-12 ENCOUNTER — TELEPHONE (OUTPATIENT)
Dept: CASE MANAGEMENT | Age: 72
End: 2022-12-12

## 2022-12-12 ENCOUNTER — OFFICE VISIT (OUTPATIENT)
Dept: BREAST CENTER | Age: 72
End: 2022-12-12
Payer: MEDICARE

## 2022-12-12 VITALS
RESPIRATION RATE: 22 BRPM | OXYGEN SATURATION: 99 % | TEMPERATURE: 97.4 F | DIASTOLIC BLOOD PRESSURE: 64 MMHG | HEART RATE: 69 BPM | BODY MASS INDEX: 31.47 KG/M2 | HEIGHT: 62 IN | SYSTOLIC BLOOD PRESSURE: 132 MMHG | WEIGHT: 171 LBS

## 2022-12-12 DIAGNOSIS — C50.919 MALIGNANT NEOPLASM OF BREAST IN FEMALE, ESTROGEN RECEPTOR POSITIVE, UNSPECIFIED LATERALITY, UNSPECIFIED SITE OF BREAST (HCC): ICD-10-CM

## 2022-12-12 DIAGNOSIS — C50.912 MALIGNANT NEOPLASM OF LEFT BREAST IN FEMALE, ESTROGEN RECEPTOR POSITIVE, UNSPECIFIED SITE OF BREAST (HCC): Primary | ICD-10-CM

## 2022-12-12 DIAGNOSIS — Z17.0 MALIGNANT NEOPLASM OF BREAST IN FEMALE, ESTROGEN RECEPTOR POSITIVE, UNSPECIFIED LATERALITY, UNSPECIFIED SITE OF BREAST (HCC): ICD-10-CM

## 2022-12-12 DIAGNOSIS — Z17.0 MALIGNANT NEOPLASM OF LEFT BREAST IN FEMALE, ESTROGEN RECEPTOR POSITIVE, UNSPECIFIED SITE OF BREAST (HCC): Primary | ICD-10-CM

## 2022-12-12 PROCEDURE — 99215 OFFICE O/P EST HI 40 MIN: CPT | Performed by: SURGERY

## 2022-12-12 PROCEDURE — 3074F SYST BP LT 130 MM HG: CPT | Performed by: SURGERY

## 2022-12-12 PROCEDURE — 3078F DIAST BP <80 MM HG: CPT | Performed by: SURGERY

## 2022-12-12 PROCEDURE — 71046 X-RAY EXAM CHEST 2 VIEWS: CPT

## 2022-12-12 PROCEDURE — 99203 OFFICE O/P NEW LOW 30 MIN: CPT | Performed by: SURGERY

## 2022-12-12 PROCEDURE — 1123F ACP DISCUSS/DSCN MKR DOCD: CPT | Performed by: SURGERY

## 2022-12-12 NOTE — LETTER
Coming in with a tiny fibroadenoma with story with    [address]  [place] [address]  Phone: [phone]  Fax: [phone]    Katherin Pennington MD    December 12, 2022     Lorne Conway MD  [facility] [place] [address] [facility] [address]    Patient: Lito Kelly   MR Number: [id number]   YOB: 1950   Date of Visit: 12/12/2022       Dear Ian Carbajal: Thank you for referring Milad Galvin to me for evaluation/treatment. Below are the relevant portions of my assessment and plan of care. 67 y.o. woman who underwent a left breast ultrasound guided biopsy in the 3:00 position on 11/21/22 at [facility]. Left breast, 3:00, core biopsy: Invasive ductal carcinoma, preliminary   grade 3   Ductal carcinoma in situ, high nuclear grade     Breast Cancer Marker Studies:   Estrogen Receptors (ER): Positive          Percentage of cells positive: 90%          Intensity: Strong     Progesterone Receptors (MD): Positive          Percentage of cells positive: 70%          Intensity: Moderate     Her-2/tsephane (c-erb B-2) protein expression: Negative (1+)     Ki-67 proliferatin index: 70%       8/26/22 - Bilateral screening mammogram - [place]:      FINDINGS:      Prior study comparison: December 9, 2019, bilateral MM tomosynthesis screening BI performed at      [facility]. December 1, 2017, bilateral MM tomosynthesis screening BI performed at [facility]. There is a  new mass lesion in the left breast seen at the central position. The mass has      spiculated margins. There are scattered fibroglandular densities (Composition Category B,  type 2). IMPRESSION:      BI-RADS 5: Highly suggestive of malignancy - Appropriate action should be taken. Ultrasound with ultrasound biopsy recommended. RECOMMENDATION:      Needle localization and biopsy.   This patient has been scheduled for a Ultrasound and Ultrasound      biopsy of the left breast on      09/02/2022. Please see that report for additional recommendation. 9/6/22 -Left breast ultrasound - [place]:      FINDINGS:  There is a 12 mm area of parenchymal distortion left breast four o'clock position with      shadowing suspicious for malignancy. Impression             Ultrasound, left breast, complete:      1.  12 mm area of parenchymal distortion left breast four o'clock position with shadowing      suspicious for malignancy. The patient has been scheduled for biopsy following this procedure. BIRADS-4                Patient underwent a left breast ultrasound guided biopsy in the 4:00 position of the left breast on 9/6/22. Pathology completed at [facility] Pathology:   Diagnosis:   OUTSIDE SLIDES FOR REVIEW   Left breast, core needle biopsy (DOS 09/06/2022): Benign breast   parenchyma with focal fibrosis     Comment:     There is no evidence of malignancy in the tissue submitted   for review. The tissue findings do not account for the presence of a   mass. Correlation with clinical and imaging findings is essential.         11/21/22 - Left diagnostic mammogram - [facility]:     FINDINGS:   There are scattered areas of fibroglandular density. Biopsy clip at site    of   irregular mass left lateral breast approximately 3 o'clock position. The   left breast hematoma has resolved mammographically since the post    ultrasound   biopsy mammogram of September 6, 2022. Left breast ultrasound was performed. The hypoechoic irregular mass 3   o'clock position 5 cm from the nipple previously biopsied with results of   benign breast tissue with focal fibrosis today measures 1.5 x 1.2 x 1.1    cm.            Impression   Suspicious mass left breast 3 o'clock position           11/21/22 - Left breast ultrasound - [facility]:     FINDINGS:   There are scattered areas of fibroglandular density. Biopsy clip at site    of   irregular mass left lateral breast approximately 3 o'clock position. The   left breast hematoma has resolved mammographically since the post    ultrasound   biopsy mammogram of September 6, 2022. Left breast ultrasound was performed. The hypoechoic irregular mass 3   o'clock position 5 cm from the nipple previously biopsied with results of   benign breast tissue with focal fibrosis today measures 1.5 x 1.2 x 1.1    cm. Impression   Suspicious mass left breast 3 o'clock position       Recommendation: Repeat left ultrasound-guided biopsy will be performed    today. BIRADS:   BIRADS - CATEGORY 4       Suspicious Abnormality. Biopsy should be considered at this time. OVERALL ASSESSMENT - SUSPICIOUS         Clinical prognostic stage Ia left breast cancer. Patient had a prior history of colon cancer 0 with complicated management. Already sees Dr. Yoandy Ramos. Plan will be to discuss with Dr. Yoandy Ramos whether sentinel lymph node biopsy is indicated in light of elevated Ki-67. May not alter patient's management going forward. Patient reticent to undergo radiation or receive chemo. Very long conversation about risks and benefits of lumpectomy without radiation, and also the risk of not getting chemotherapy if indicated. Patient and daughter's questions were answered to the best of my ability. We will be scheduling for surgery in January. We will need to hold her Eliquis for 1 day preoperatively. 1. Metastatic workup to include CXR, CBC, CMP, completed    2. Patient has met with our Breast Navigator for information and to receive literature. 3. She would likely be a candidate for conservative therapy. 4. We had a discussion of the treatment options for breast cancer including risks, benefits, and recurrence rates for breast conservation therapy versus mastectomy.   We discussed the indications and risks of sentinel lymph node biopsy and possibility of axillary lymph node dissection. We also discussed the possible role of systemic and radiation therapy. NCCN guidelines were utilized in our discussion. The patient was given the appropriate contact numbers and will call with any questions or concerns. If you have questions, please do not hesitate to call me. I look forward to following Olivier Armijo along with you.     Sincerely,  Ramandeep Lantigua MD

## 2022-12-12 NOTE — TELEPHONE ENCOUNTER
Met with patient regarding her recent breast cancer diagnosis at surgical consultation appointment with Tamika Saini. Reviewed pathology report. Instructed patient on her breast biopsy pathology findings including cancer type (IDC) and hormone receptor status (ER+, IN+, Her2-). Instructed on next steps including breast surgery options per 's recommendations and any additional imaging that may be required. Provided with extensive literature including \"Be A Survivor: Your guide to Breast Cancer Treatment\", chapter 4 reviewed, Your Guide to Your Breast Cancer Pathology Report, American Cancer Society Exercises after Breast Surgery and Lymphedema Early Signs and Symptoms. Today patient received copy of their pathology report as well as a list of local medical oncology providers, information on diagnosis and local support group resources. Provided patient with my contact information and encouraged to call me with questions or concerns. Patient verbalizes understanding and appreciative of nurse navigator visit.  JABIER JohnsonN,RN-OCN

## 2022-12-12 NOTE — PROGRESS NOTES
G8 QUESTIONNAIRE    ITEMS POSSIBLE ANSWERS SCORE         A Has food intake declined over the past 3 months due to loss of appetite, digestive problems, chewing or swallowing difficulties? 0 : severe decrease in food intake  1 : moderate decrease in food intake  2:  no decrease in food intake  2        B                                                                                Weight loss during the last 3 months 0 : weight loss > 3 kg  1 : does not know  2 : weight loss between 1 & 3 kgs  3 : no weight loss  3       C                                                                                                                            Mobility 0 : bed or chair bound  1 : able to get out of bed/chair but does not go out  2 : goes out 2        E                                                                           Neuropsychological Problems 0 : severe dementia or depression  1 : mild dementia or depression  2 : no psychological problems 2        F                                                                                               Body Mass Index (BMI (weight in kg/height in m2)            Body mass index is 31.28 kg/m². 0 : BMI < 19  1 : BMI = 19 to BMI < 21  2 : BMI = 21 to BMI < 23  3 : BMI = 23 and > 23 3       H                                                                                                    Takes more than 3 medications per day 0 : yes  1 : no 0        P                                                                                            In comparison with other people of the same age, how does the patient consider his/her health status?  0 : not as good  0.5 : does not know  1 : as good  2 : better 1                                                                                               Age 0 : > 85  1 : 80 - 85  2 : < 80 2                                                                                                            TOTAL SCORE ( 0- 17) 15

## 2022-12-16 ENCOUNTER — OFFICE VISIT (OUTPATIENT)
Dept: ONCOLOGY | Age: 72
End: 2022-12-16
Payer: MEDICARE

## 2022-12-16 ENCOUNTER — HOSPITAL ENCOUNTER (OUTPATIENT)
Dept: INFUSION THERAPY | Age: 72
Discharge: HOME | End: 2022-12-16
Payer: MEDICARE

## 2022-12-16 VITALS
BODY MASS INDEX: 31.3 KG/M2 | WEIGHT: 170.1 LBS | TEMPERATURE: 97.3 F | SYSTOLIC BLOOD PRESSURE: 168 MMHG | OXYGEN SATURATION: 99 % | HEIGHT: 62 IN | HEART RATE: 71 BPM | DIASTOLIC BLOOD PRESSURE: 75 MMHG

## 2022-12-16 DIAGNOSIS — C18.9 MALIGNANT NEOPLASM OF COLON, UNSPECIFIED PART OF COLON (HCC): Primary | ICD-10-CM

## 2022-12-16 DIAGNOSIS — C18.9 MALIGNANT NEOPLASM OF COLON, UNSPECIFIED PART OF COLON (HCC): ICD-10-CM

## 2022-12-16 LAB
BASOPHILS ABSOLUTE: 0.06 E9/L (ref 0–0.2)
BASOPHILS RELATIVE PERCENT: 1.1 % (ref 0–2)
EOSINOPHILS ABSOLUTE: 0.2 E9/L (ref 0.05–0.5)
EOSINOPHILS RELATIVE PERCENT: 3.8 % (ref 0–6)
HCT VFR BLD CALC: 37.3 % (ref 34–48)
HEMOGLOBIN: 12.5 G/DL (ref 11.5–15.5)
IMMATURE GRANULOCYTES #: 0.01 E9/L
IMMATURE GRANULOCYTES %: 0.2 % (ref 0–5)
LYMPHOCYTES ABSOLUTE: 1.65 E9/L (ref 1.5–4)
LYMPHOCYTES RELATIVE PERCENT: 31.4 % (ref 20–42)
MCH RBC QN AUTO: 31 PG (ref 26–35)
MCHC RBC AUTO-ENTMCNC: 33.5 % (ref 32–34.5)
MCV RBC AUTO: 92.6 FL (ref 80–99.9)
MONOCYTES ABSOLUTE: 0.5 E9/L (ref 0.1–0.95)
MONOCYTES RELATIVE PERCENT: 9.5 % (ref 2–12)
NEUTROPHILS ABSOLUTE: 2.84 E9/L (ref 1.8–7.3)
NEUTROPHILS RELATIVE PERCENT: 54 % (ref 43–80)
PDW BLD-RTO: 12.3 FL (ref 11.5–15)
PLATELET # BLD: 138 E9/L (ref 130–450)
PMV BLD AUTO: 8.9 FL (ref 7–12)
RBC # BLD: 4.03 E12/L (ref 3.5–5.5)
WBC # BLD: 5.3 E9/L (ref 4.5–11.5)

## 2022-12-16 PROCEDURE — 3078F DIAST BP <80 MM HG: CPT | Performed by: INTERNAL MEDICINE

## 2022-12-16 PROCEDURE — 3074F SYST BP LT 130 MM HG: CPT | Performed by: INTERNAL MEDICINE

## 2022-12-16 PROCEDURE — 36415 COLL VENOUS BLD VENIPUNCTURE: CPT

## 2022-12-16 PROCEDURE — 99214 OFFICE O/P EST MOD 30 MIN: CPT | Performed by: INTERNAL MEDICINE

## 2022-12-16 PROCEDURE — 85025 COMPLETE CBC W/AUTO DIFF WBC: CPT

## 2022-12-16 PROCEDURE — 99212 OFFICE O/P EST SF 10 MIN: CPT

## 2022-12-16 PROCEDURE — 1123F ACP DISCUSS/DSCN MKR DOCD: CPT | Performed by: INTERNAL MEDICINE

## 2022-12-16 NOTE — PROGRESS NOTES
Patient did NOT stop at check out window, left without AVS.  Patient already has AVS with next follow up appointment from previous visit.

## 2022-12-16 NOTE — PROGRESS NOTES
701  [facility] MED ONCOLOGY  [address] [place] [id number]  Dept: 71 Yessi Torres: [phone]  Attending Progress Note      Reason for Visit:   1. Recurrent VTE. 2. Factor V Leiden Homozygosity. 3. Colon cancer. Referring Physician:  Nancy Armstrong NP    PCP:  Kulwinder Campos MD    History of Present Illness: The patient is a pleasant 77-year-old lady with a PMH significant for obesity, HTN, and hyperlipidemia, who was diagnosed with PE and bilateral DVTs that were provoked following ventral hernia repair in March 2017, she had her surgery done in [facility], she was anticoagulated with Coumadin, she was complaining of left knee pain, had a venous US done on 3/1/2019, revealing an acute DVT in the left posterior tibial vein, chronic appearing thromus from proximal femoral to peroneal veins. She was started on Lovenox 100 mg subq bid, INR was therapeutic, 2.2. She does not like the injections, prefers to be on an oral anticoagulant, AC was changed to Eliquis. FH is negative for VTE. The patient was last seen in the office in November 2020, the patient was diagnosed with colon cancer, she underwent a right hemicolectomy on 11/28/2020 by Dr. Josh Bernstein at [facility], was diagnosed with stage I colon cancer,  Afterwards the patient was peritoneal and was reexplored with findings of missed enterotomy which was repaired. After this second surgery experienced a cardiopulmonary arrest, ROSC was achieved and she was intubated. Patient was transferred from [facility] to the [facility] facility. On 11/27 she underwent exploratory laparotomy with small bowel resection plus loop ileostomy plus distal mucous fistula and excision of mesh. Afterwards patient developed an abdominal abscess that required IR drainage on 12/3. Patient underwent a tracheostomy on 12/8.     The epistaxis had improved, she did not see ENT. The patient had a repeat left breast biopsy done on 11/21/2022, pathology was consistent with invasive ductal carcinoma, grade 3, with associated DCIS, ER +90%, MO +70%, HER2/stephane negative, 1+ by IHC. Ki-67 is 70%. Review of Systems;  CONSTITUTIONAL: No fever, chills. Good appetite, feeling tired. Pos for weight los. ENMT: Eyes: No diplopia; Nose: Pos for epistaxis. Mouth: No sore throat. RESPIRATORY: No hemoptysis, shortness of breath, cough. CARDIOVASCULAR: No chest pain, palpitations. GASTROINTESTINAL: No nausea/vomiting, abdominal pain, diarrhea/constipation. GENITOURINARY: No dysuria, urinary frequency, hematuria. Pos dizziness. NEURO: No syncope, presyncope, headache.   Remainder:  ROS NEGATIVE    Past Medical History:      Diagnosis Date    Colon cancer ([facility] 11/01/2020    Hyperlipidemia     LBBB (left bundle branch block)     Obesity      Patient Active Problem List   Diagnosis    Mixed hyperlipidemia    Essential hypertension    LBBB (left bundle branch block)    Chronic deep vein thrombosis (DVT) of lower extremity (HCC)    Personal history of pulmonary embolism    Type 2 diabetes mellitus with complication, without long-term current use of insulin (HCC)    Chronic diastolic (congestive) heart failure (HCC)    Vitamin D deficiency    Venous insufficiency    Age-related osteoporosis without current pathological fracture    Other specified hypothyroidism    Primary generalized (osteo)arthritis    Chronic pain syndrome    Morbid obesity with BMI of 45.0-49.9, adult ([facility])    Seasonal allergic rhinitis due to pollen    Moderate persistent asthma with acute exacerbation    Class 3 severe obesity due to excess calories with serious comorbidity and body mass index (BMI) of 45.0 to 49.9 in adult [facility])    Encounter for immunization    Factor V deficiency ([facility]    Iron deficiency anemia due to chronic blood loss    Acute kidney injury ([facility]    CKD (chronic kidney disease) stage 3, GFR 30-59 ml/min (HCC)    Hyponatremia    Hyperkalemia    Hypotension    Prolonged Q-T interval on ECG    Sepsis ([address]    Severe protein-calorie malnutrition ([address]        Past Surgical History:      Procedure Laterality Date    APPENDECTOMY      CHOLECYSTECTOMY      COLON SURGERY N/A 2020    COLOSTOMY  2007    due to divertiulosis since than it was reversed    HERNIA REPAIR      HYSTERECTOMY, TOTAL ABDOMINAL (CERVIX REMOVED)  1990    US BREAST NEEDLE BIOPSY LEFT Left 2022    Benign    US BREAST NEEDLE BIOPSY LEFT Left 2022    US BREAST NEEDLE BIOPSY LEFT 2022 [facility]       Family History:  Family History   Problem Relation Age of Onset    Cancer Mother 62        lung and brain cancer    Diabetes Father     Kidney Disease Father     Cancer Sister 25        leukemia    Cancer Paternal Grandmother 80        colon cancer    Cancer Son 43        kidney cancer       Medications:  Reviewed and reconciled.     Social History:  Social History     Socioeconomic History    Marital status:      Spouse name: Not on file    Number of children: Not on file    Years of education: Not on file    Highest education level: Not on file   Occupational History    Not on file   Tobacco Use    Smoking status: Former     Packs/day: 1.00     Years: 40.00     Pack years: 40.00     Types: Cigarettes     Quit date: 2008     Years since quittin.9    Smokeless tobacco: Never   Vaping Use    Vaping Use: Never used   Substance and Sexual Activity    Alcohol use: Yes     Comment: occassionally    Drug use: No    Sexual activity: Not Currently   Other Topics Concern    Not on file   Social History Narrative    Not on file     Social Determinants of Health     Financial Resource Strain: Not on file   Food Insecurity: Not on file   Transportation Needs: Not on file   Physical Activity: Not on file   Stress: Not on file   Social Connections: Not on file   Intimate Partner Violence: Not on file   Housing Stability: Not on file       Allergies: Allergies   Allergen Reactions    Amlodipine      Hives      Cefdinir     E-Mycin [Erythromycin]      Made her dizzy and she didn't feel well    Lisinopril     Seasonal        Physical Exam:  BP (!) 168/75   Pulse 71   Temp 97.3 °F (36.3 °C)   Ht 5' 2\" (1.575 m)   Wt 170 lb 1.6 oz (77.2 kg)   SpO2 99%   BMI 31.11 kg/m²     GENERAL: Alert, oriented x 3, not in acute distress. HEENT: PERRLA; EOMI. Oropharynx clear. NECK: Supple. No palpable cervical or supraclavicular lymphadenopathy. LUNGS: Good air entry bilaterally. No wheezing, crackles or rhonchi. CARDIOVASCULAR: Regular rate. No murmurs, rubs or gallops. ABDOMEN: Midline incision, ostomy site Intact, clear drainage in the bag on the right. EXTREMITIES: mildt bilateral LLE, she is wearing compression stockings, no tenderness/erythema or warmth of the calves. NEUROLOGIC: No focal deficits. Impression/Plan:     The patient is a pleasant 51-year-old lady with a PMH significant for obesity, HTN, and hyperlipidemia, who was diagnosed with PE and bilateral DVTs that were provoked following ventral hernia repair in March 2017, she had her surgery done in [facility], she was anticoagulated with Coumadin, she was complaining of left knee pain, had a venous US done on 3/1/2019, revealing an acute DVT in the left posterior tibial vein, chronic appearing thromus from proximal femoral to peroneal veins. She was started on Lovenox 100 mg subq bid, INR was therapeutic, 2.2. She does not like the injections, prefers to be on an oral anticoagulant. Lovenox was d/sade and she was started on Eliquis. FH is negative for VTE. Patient with history of provoked PE and bilateral DVTs in 2017, she has recurrent VTE, ordered testing for inherited thrombophilia that is reliable in the setting of acute thrombosis.  She is homozygous for factor V Leiden mutation, this was discussed with the patient and her son, ACLA are neg, anti B2GPI Antibodies are neg, PT gene mutation is negative. We discussed that obesity and decreased mobility increase the risk of VTE, I encouraged her to continue with weight loss. She had a f/up LLE venous US done revealing improvement of the DVT, she has a persistent occlusive thrombus in the proximal left superficial femoral vein and nonocclusive thrombus in the mid to distal superficial femoral vein, it is hard to tell for how long she had had those clots, continue  Eliquis, I do recommend lifelong anticoagulation unless if she has a contraindication to [facility] in the future. The patient needs financial assistance on Eliquis, referral was placed to [employer]. Will be provided with samples today. The patient was last seen in the office in November 2020, the patient was diagnosed with colon cancer, CEA was 0.6, there was no evidence of metastatic disease she underwent a right hemicolectomy on 11/28/2020 by Dr. Governor Brian at [facility], was diagnosed with stage I colon cancer,  Afterwards the patient was peritoneal and was reexplored with findings of missed enterotomy which was repaired. After this second surgery experienced a cardiopulmonary arrest, ROSC was achieved and she was intubated. Patient was transferred from [place] to the [facility] facility. On 11/27 she underwent exploratory laparotomy with small bowel resection plus loop ileostomy plus distal mucous fistula and excision of mesh. Afterwards patient developed an abdominal abscess that required IR drainage. Epistaxis had improved. Discussed possibly decreasing the dose of Eliquis 2.5 mg p.o. twice daily if she continues to have epistaxis. her hemoglobin/hematocrit are normal.  Eliquis to be held 5 days prior to the breast surgery.     Mid area of parenchymal distortion in the left breast, which was consistent with mild fibrocystic changes, the patient was seen by Dr. Cami Singh, the patient had a repeat left breast biopsy done on 11/21/2022, pathology was consistent with invasive ductal carcinoma, grade 3, with associated DCIS, ER +90%, [place] +70%, HER2/stephane negative, 1+ by IHC. Ki-67 is 70%. Clinical stage [id number], clinical prognostic stage IA, diagnosis, characteristics of her tumor and prognosis were discussed with the patient and her daughter, the patient will have a lumpectomy done, would recommend SLN biopsy due to the high Ki-67, we discussed that Oncotype DX would be recommended to determine whether or not she will need chemotherapy, the patient does not want chemotherapy, we will rediscuss once the pathology results are available. RTC after surgery. Thank you for allowing us to participate in the care of Mrs. Banuelos.     Cynthia Nuñez MD   HEMATOLOGY/MEDICAL [facility]  [address] [facility] MED ONCOLOGY  [address] [place] [id number]  Dept: 71 Yessi Torres: [phone]

## 2022-12-22 ENCOUNTER — PREP FOR PROCEDURE (OUTPATIENT)
Dept: BREAST CENTER | Age: 72
End: 2022-12-22

## 2022-12-22 DIAGNOSIS — Z17.0 MALIGNANT NEOPLASM OF LEFT BREAST IN FEMALE, ESTROGEN RECEPTOR POSITIVE, UNSPECIFIED SITE OF BREAST (HCC): Primary | ICD-10-CM

## 2022-12-22 DIAGNOSIS — C50.912 MALIGNANT NEOPLASM OF LEFT BREAST IN FEMALE, ESTROGEN RECEPTOR POSITIVE, UNSPECIFIED SITE OF BREAST (HCC): Primary | ICD-10-CM

## 2023-01-16 ENCOUNTER — TELEPHONE (OUTPATIENT)
Dept: BREAST CENTER | Age: 73
End: 2023-01-16

## 2023-01-16 NOTE — TELEPHONE ENCOUNTER
Patient surgery has been scheduled with surgery scheduling 2/1/23 @ 1:00pm / NL & Nuclear injection 11:00am / Arrival 9:30am -- Left breast needle localized lumpectomy - blue dye injection - left axillary sentinel lymph node excision - possible left axillary dissection - - General Trident / Neoprobe. Patient notified of date and time of surgery. Patient was instructed to enter the [facility] entrance of the hospital. Patient was instructed NPO after midnight the night prior to surgery. Patient was instructed NO ASA products or blood thinners 3-5days prior to surgery. Patient was instructed to bring a sports bra with her day of surgery. Preadmission testing will contact patient prior to surgery, to go over medications and any additional testing that may need to be completed. No prior authorization required. Patient was instructed by Dr Yodit Valentin to hold Eliquis  5 days prior to surgery per patient. Post op visit 2/14/23 @ 9:00am [facility].

## 2023-01-16 NOTE — PROGRESS NOTES
Patient had several questions regarding her testing ie needle loc, nuclear. She received a call from a number that provided her incorrect information and is very anxious. I explained her expectations for surgery morning and the times and locations of her pre-op. She verbalized relief.

## 2023-01-23 VITALS — WEIGHT: 170 LBS | BODY MASS INDEX: 31.28 KG/M2 | HEIGHT: 62 IN

## 2023-01-23 RX ORDER — SODIUM CHLORIDE 0.9 % (FLUSH) 0.9 %
5-40 SYRINGE (ML) INJECTION EVERY 12 HOURS SCHEDULED
Status: CANCELLED | OUTPATIENT
Start: 2023-01-23

## 2023-01-23 RX ORDER — ACETAMINOPHEN 325 MG/1
1000 TABLET ORAL ONCE
Status: CANCELLED | OUTPATIENT
Start: 2023-01-23 | End: 2023-01-23

## 2023-01-23 RX ORDER — SODIUM CHLORIDE 0.9 % (FLUSH) 0.9 %
5-40 SYRINGE (ML) INJECTION PRN
Status: CANCELLED | OUTPATIENT
Start: 2023-01-23

## 2023-01-23 RX ORDER — SODIUM CHLORIDE 9 MG/ML
INJECTION, SOLUTION INTRAVENOUS PRN
Status: CANCELLED | OUTPATIENT
Start: 2023-01-23

## 2023-01-23 RX ORDER — SODIUM CHLORIDE, SODIUM LACTATE, POTASSIUM CHLORIDE, CALCIUM CHLORIDE 600; 310; 30; 20 MG/100ML; MG/100ML; MG/100ML; MG/100ML
INJECTION, SOLUTION INTRAVENOUS CONTINUOUS
Status: CANCELLED | OUTPATIENT
Start: 2023-01-23

## 2023-01-23 NOTE — H&P
Patient ID: Cyndi Gonzalez is a 67 y.o. female. HPI  History and Physical     Patient's Name/Date of Birth: Cyndi Gonzalez / 1950     Cyndi Gonzalez presents for conservative therapy for left breast cancer. PCP: Jami Stoll MD, Gynecologist: none. The lesion is located in the 3 o'clock position of the left breast. The lesion was discovered by mammogram. The patient has not noted a change in BSE since presentation. Patient denies nipple discharge. Patient denies a personal history of breast cancer. Breast cancer risk factors include patient had colon cancer at 79, paternal grandmother colon cancer at 80, mom with lung cancer at 62, son kidney cancer, sister with leukemia. Ashkenazi Pent[employer]al Ancestry: No.     OBSTETRIC RELATED HISTORY:  Age of menarche was 15. Age of menopause was hysterectomy age 36. Patient admits to hormonal therapy. 5 years  Patient is . Age of first live birth was 21. Patient did breast feed. Is patient interested in fertility information about fertility preservation? No     CANCER SURVEILLANCE HISTORY:  Mammograms: Yes  Breast MRI's: No   Breast Biopsies: Yes   Colonoscopy: Yes   GI Polyps: Yes colon cancer 2 years ago, had surgery with colostomy  EGD: No   Pelvic Exam: No   Pap Smear: No   Dermatology: No   Lung screening: no        Have you received the flu vaccination this year? No  Have you received the Pneumococcal vaccination in the last 5 years? Yes  Have you received the COVID 19 vaccination this year? Yes     Estimated body mass index is 31.46 kg/m² as calculated from the following:    Height as of 22: 5' 2\" (1.575 m). Weight as of 22: 172 lb (78 kg). Bra Size: 42b     Because violence is so common, we ask all our patients: are you in a relationship or do you live with a person who threatens, hurts, or controls you:  Safe at home     Patient drinks little caffeinated beverages. Patient does not smoke cigarettes.  Patient does not use recreational drugs. Lymphedema screening completed. See documentation in the flow sheets.                Past Medical History        Past Medical History:   Diagnosis Date    Colon cancer ([address] 11/01/2020    Hyperlipidemia      LBBB (left bundle branch block)      Obesity              Past Surgical History         Past Surgical History:   Procedure Laterality Date    APPENDECTOMY        CHOLECYSTECTOMY        COLON SURGERY N/A 11/2020    COLOSTOMY   01/01/2007     due to divertiulosis since than it was reversed    HERNIA REPAIR        HYSTERECTOMY, TOTAL ABDOMINAL (CERVIX REMOVED)   01/01/1990    US BREAST NEEDLE BIOPSY LEFT Left 09/06/2022     Benign    US BREAST NEEDLE BIOPSY LEFT Left 11/21/2022     US BREAST NEEDLE BIOPSY LEFT 11/21/2022 [facility]            Current Facility-Administered Medications          Current Outpatient Medications   Medication Sig Dispense Refill    allopurinol (ZYLOPRIM) 100 MG tablet Take by mouth        hydrOXYzine HCl (ATARAX) 10 MG tablet Take by mouth        levocetirizine (XYZAL) 5 MG tablet Take by mouth        Magnesium 400 MG TABS Take 400 mg by mouth daily        sodium chloride 1 g tablet Take 1 g by mouth 3 times daily        NONFORMULARY in the morning, at noon, and at bedtime collogen and vitamin c        Multiple Vitamins-Minerals (THERAPEUTIC MULTIVITAMIN-MINERALS) tablet Take 1 tablet by mouth daily        metoprolol succinate (TOPROL XL) 25 MG extended release tablet Take 0.5 tablets by mouth daily 90 tablet 3    ferrous sulfate (IRON 325) 325 (65 Fe) MG tablet ferrous sulfate Ferrous Sulfate Active 325 MG Oral Daily After A Meal 100 October 23rd, 2020 10:46am 10-  [facility] ([id number])        apixaban (ELIQUIS) 5 MG TABS tablet Take 1 tablet by mouth 2 times daily 84 tablet 0    lovastatin (MEVACOR) 40 MG tablet Take 1 tablet by mouth nightly 90 tablet 1    PROAIR  (90 Base) MCG/ACT inhaler Inhale 1 puff into the lungs 4 times daily 3 Inhaler 1    Cholecalciferol (VITAMIN D3) 25 MCG (1000 UT) CAPS Take by mouth daily        Calcium Carbonate (CALTRATE 600 PO) Take by mouth 2 times daily                     Current Facility-Administered Medications   Medication Dose Route Frequency Provider Last Rate Last Admin    perflutren lipid microspheres (DEFINITY) injection 1.65 mg  1.5 mL IntraVENous ONCE PRN Rudi Mohamud MD                      Allergies   Allergen Reactions    Amlodipine         Hives       Cefdinir      E-Mycin [Erythromycin]         Made her dizzy and she didn't feel well    Lisinopril      Seasonal           Family History         Family History   Problem Relation Age of Onset    Cancer Mother 62         lung and brain cancer    Diabetes Father      Kidney Disease Father      Cancer Sister 25         leukemia    Cancer Son 43         kidney cancer            Social History               Socioeconomic History    Marital status:        Spouse name: Not on file    Number of children: Not on file    Years of education: Not on file    Highest education level: Not on file   Occupational History    Not on file   Tobacco Use    Smoking status: Former       Packs/day: 1.00       Years: 40.00       Pack years: 40.00       Types: Cigarettes       Quit date: 2008       Years since quittin.9    Smokeless tobacco: Never   Vaping Use    Vaping Use: Never used   Substance and Sexual Activity    Alcohol use: Yes       Comment: occassionally    Drug use: No    Sexual activity: Not Currently   Other Topics Concern    Not on file   Social History Narrative    Not on file      Social Determinants of Health      Financial Resource Strain: Not on file   Food Insecurity: Not on file   Transportation Needs: Not on file   Physical Activity: Not on file   Stress: Not on file   Social Connections: Not on file   Intimate Partner Violence: Not on file   Housing Stability: Not on file            Occupation: Enjoys watching TV, spending time with 4 grandchildren        Review of Systems  CONSTITUTIONAL: No fever, chills. Good appetite and energy level. ENMT: Eyes: No diplopia; Nose: No epistaxis. Mouth: No sore throat. RESPIRATORY: No hemoptysis, shortness of breath, cough. CARDIOVASCULAR: No chest pain, pressure, or palpitations. GASTROINTESTINAL: No nausea/vomiting, abdominal pain, diarrhea/constipation. No blood in the stools. GENITOURINARY: No dysuria, urinary frequency, hematuria. NEURO: No syncope, presyncope, headache. 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 On Eliquis     Objective:   Physical Exam  Vitals and nursing note reviewed. Exam conducted with a chaperone present. Constitutional:       General: She is not in acute distress. Appearance: She is well-developed. She is not diaphoretic. HENT:      Head: Normocephalic and atraumatic. Eyes:      Conjunctiva/sclera: Conjunctivae normal.      Pupils: Pupils are equal, round, and reactive to light. Neck:      Thyroid: No thyromegaly. Trachea: No tracheal deviation. Cardiovascular:      Rate and Rhythm: Normal rate and regular rhythm. Heart sounds: Normal heart sounds. Pulmonary:      Effort: Pulmonary effort is normal. No respiratory distress. Breath sounds: Normal breath sounds. Chest:   Breasts:     Breasts are symmetrical.      Right: No inverted nipple, mass, nipple discharge, skin change or tenderness. Left: No inverted nipple, mass, nipple discharge, skin change or tenderness. Comments: No palpable masses either breast. Ptotic breasts. Abdominal:      General: There is no distension. Palpations: Abdomen is soft. Musculoskeletal:      Right shoulder: Normal range of motion. Left shoulder: Normal range of motion. Cervical back: Normal range of motion and neck supple. Lymphadenopathy:      Cervical: No cervical adenopathy.       Upper Body:      Right upper body: No supraclavicular adenopathy. Left upper body: No supraclavicular adenopathy. Skin:     General: Skin is warm and dry. Coloration: Skin is not pale. Findings: No erythema. Neurological:      Mental Status: She is alert and oriented to person, place, and time. Psychiatric:         Behavior: Behavior normal.         Thought Content: Thought content normal.         Judgment: Judgment normal.                  Assessment:   67 y.o. woman who underwent a left breast ultrasound guided biopsy in the 3:00 position on 11/21/22 at [facility]. Left breast, 3:00, core biopsy: Invasive ductal carcinoma, preliminary   grade 3   Ductal carcinoma in situ, high nuclear grade     Breast Cancer Marker Studies:   Estrogen Receptors (ER): Positive          Percentage of cells positive: 90%          Intensity: Strong     Progesterone Receptors (SC): Positive          Percentage of cells positive: 70%          Intensity: Moderate     Her-2/stephane (c-erb B-2) protein expression: Negative (1+)     Ki-67 proliferatin index: 70%         8/26/22 - Bilateral screening mammogram - [place]:       FINDINGS:      Prior study comparison: December 9, 2019, bilateral MM tomosynthesis screening BI performed at      [facility]. December 1, 2017, bilateral MM tomosynthesis screening BI performed at [facility]. There is a  new mass lesion in the left breast seen at the central position. The mass has      spiculated margins. There are scattered fibroglandular densities (Composition Category B,  type 2). IMPRESSION:      BI-RADS 5: Highly suggestive of malignancy - Appropriate action should be taken. Ultrasound with ultrasound biopsy recommended. RECOMMENDATION:      Needle localization and biopsy. This patient has been scheduled for a Ultrasound and Ultrasound      biopsy of the left breast on      09/02/2022.   Please see that report for additional recommendation. 9/6/22 -Left breast ultrasound - [place] [facility]:       FINDINGS:  There is a 12 mm area of parenchymal distortion left breast four o'clock position with      shadowing suspicious for malignancy. Impression             Ultrasound, left breast, complete:      1.  12 mm area of parenchymal distortion left breast four o'clock position with shadowing      suspicious for malignancy. The patient has been scheduled for biopsy following this procedure. BIRADS-4                  Patient underwent a left breast ultrasound guided biopsy in the 4:00 position of the left breast on 9/6/22. Pathology completed at  Pathology:   Diagnosis:   OUTSIDE SLIDES FOR REVIEW   Left breast, core needle biopsy (DOS 09/06/2022): Benign breast   parenchyma with focal fibrosis     Comment:     There is no evidence of malignancy in the tissue submitted   for review. The tissue findings do not account for the presence of a   mass. Correlation with clinical and imaging findings is essential.           11/21/22 - Left diagnostic mammogram - [facility]:      FINDINGS:   There are scattered areas of fibroglandular density. Biopsy clip at site    of   irregular mass left lateral breast approximately 3 o'clock position. The   left breast hematoma has resolved mammographically since the post    ultrasound   biopsy mammogram of September 6, 2022. Left breast ultrasound was performed. The hypoechoic irregular mass 3   o'clock position 5 cm from the nipple previously biopsied with results of   benign breast tissue with focal fibrosis today measures 1.5 x 1.2 x 1.1    cm. Impression   Suspicious mass left breast 3 o'clock position               11/21/22 - Left breast ultrasound - [facility]:      FINDINGS:   There are scattered areas of fibroglandular density. Biopsy clip at site    of   irregular mass left lateral breast approximately 3 o'clock position. The   left breast hematoma has resolved mammographically since the post    ultrasound   biopsy mammogram of September 6, 2022. Left breast ultrasound was performed. The hypoechoic irregular mass 3   o'clock position 5 cm from the nipple previously biopsied with results of   benign breast tissue with focal fibrosis today measures 1.5 x 1.2 x 1.1    cm. Impression   Suspicious mass left breast 3 o'clock position       Recommendation: Repeat left ultrasound-guided biopsy will be performed    today. BIRADS:   BIRADS - CATEGORY 4       Suspicious Abnormality. Biopsy should be considered at this time. OVERALL ASSESSMENT - SUSPICIOUS            Clinical prognostic stage IA left breast cancer. Patient had a prior history of colon cancer 0 with complicated management. Already sees Dr. Lroi Perez. Plan will be to discuss with Dr. Lori Perez whether sentinel lymph node biopsy is indicated in light of elevated Ki-67. May not alter patient's management going forward. Patient reticent to undergo radiation or receive chemo. Very long conversation about risks and benefits of lumpectomy without radiation, and also the risk of not getting chemotherapy if indicated. Patient and daughter's questions were answered to the best of my ability. We will need to hold her Eliquis for 1 day preoperatively.     Plan: left blue dye injection, left localized breast lumpectomy, left axillary sentinel lymph node excision, possible left axillary dissection     The risks, benefits, expected outcomes, and alternatives to this procedure have been discussed with the patient, which include but are not limited to bleeding, infection, flap necrosis and medical complications to anesthesia or surgery such as pneumonia, heart problems, blood clots, etc. Patient also was told that with minimally invasive procedures adequate margins are not always obtained with the first operation, and she has a >20% chance of requiring a second procedure to obtain clear margins around her tumor. Patient understood and desires to undergo the procedure.

## 2023-01-25 NOTE — PROGRESS NOTES
[address]   PRE-ADMISSION TESTING GENERAL INSTRUCTIONS  PAT Phone Number: [phone]      GENERAL INSTRUCTIONS:    [x] Antibacterial Soap Shower Night before and/or AM of surgery. [] CHG Wipes instruction sheet and wipes given. [] Hibiclens shower the night before and the morning of surgery.   -Do not use Hibiclens on your face or head. [x] Do not wear makeup, lotions, powders, deodorant. [x] Nothing by mouth after midnight; including gum, candy, mints, or water. [x] You may brush your teeth, gargle, but do NOT swallow water. [x] No tobacco products, illegal drugs, or alcohol within 24 hours of your surgery. [x] Jewelry or valuables should not be brought to the hospital. All body and/or tongue piercing's must be removed prior to arriving to hospital. No contact lens or hair pins. [x] Arrange transportation with a responsible adult  to and from the hospital. Arrange for someone to be with you for the remainder of the day and for 24 hours after your procedure due to having had anesthesia. -Who will be your  for transportation?___Shobha__         -Who will be staying with you for 24 hrs after your procedure?__________________  [x] Bring insurance card and photo ID.  [] Bring copy of living will or healthcare power of  papers to be placed in your electronic record. [] Urine Pregnancy test will be preformed the day of surgery. A specimen sample may be brought from home. [] Transfusion Bracelet: Please bring with you to hospital, day of surgery. PARKING INSTRUCTIONS:     [x] ARRIVAL DATE & TIME:  2/1  @  4530  [x] Enter into the [employer]. Two people may accompany you. Masks are not required but are recommended. [x] Parking Lot \"I\" is where you will park. It is located on the corner of [address] and [address]. The entrance is on [address].    Upon entering the parking lot, a voucher ticket will print    EDUCATION INSTRUCTIONS:        [] Knee or Hip replacement booklet & exercise pamphlets given. [] [address] placed in chart. [] Pre-admission Testing educational folder given  [] Incentive Spirometry,coughing & deep breathing exercises reviewed. [] Medication information sheet(s)   [] Fluoroscopy-Xray used in surgery reviewed with patient. Educational pamphlet placed in chart. [] Pain: Post-op pain is normal and to be expected. You will be asked to rate your pain from 0-10. [] Joint camp offered. [] Joint replacement booklets given.  [] Spine Navigator to see in PAT. [] Other:___________________________    MEDICATION INSTRUCTIONS:    [x] Bring a complete list of your medications, please write the last time you took the medicine, give this list to the nurse in Pre-Op. [x] Take only the following medications the morning of surgery with 1-2 ounces of water:  none  [x] Stop all herbal supplements and vitamins 5 days before surgery. Stop NSAIDS 7 days before surgery. [] DO NOT take any diabetic medicine the morning of surgery. Follow instructions for insulin the day before surgery. [] If you are diabetic and your blood sugar is low or you feel symptomatic, you may drink 1-2 ounces of apple juice or take a glucose tablet.            -The morning of your procedure, you may call the pre-op area if you have concerns about your blood sugar [phone]. [x] Use your inhalers the morning of surgery. Bring your emergency inhaler with you day of surgery. [x] Follow physician instructions regarding any blood thinners you may be taking. Eliquis LD 1/26    WHAT TO EXPECT:    [x] The day of surgery you will be greeted and checked in by the [employer].  In addition, you will be registered in the [facility] by a Patient Access Representative. Please bring your photo ID and insurance card.  A nurse will greet you in accordance to the time you are needed in the pre-op area to prepare you for surgery. Please do not be discouraged if you are not greeted in the order you arrive as there are many variables that are involved in patient preparation. Your patience is greatly appreciated as you wait for your nurse. Please bring in items such as: books, magazines, newspapers, electronics, or any other items  to occupy your time in the waiting area. [x]  Delays may occur with surgery and staff will make a sincere effort to keep you informed of delays. If any delays occur with your procedure, we apologize ahead of time for your inconvenience as we recognize the value of your time.

## 2023-01-31 ENCOUNTER — ANESTHESIA EVENT (OUTPATIENT)
Dept: OPERATING ROOM | Age: 73
End: 2023-01-31
Payer: MEDICARE

## 2023-02-01 ENCOUNTER — HOSPITAL ENCOUNTER (OUTPATIENT)
Dept: GENERAL RADIOLOGY | Age: 73
Discharge: HOME | End: 2023-02-03
Attending: SURGERY
Payer: MEDICARE

## 2023-02-01 ENCOUNTER — HOSPITAL ENCOUNTER (OUTPATIENT)
Age: 73
Setting detail: OUTPATIENT SURGERY
Discharge: HOME | End: 2023-02-01
Attending: SURGERY | Admitting: SURGERY
Payer: MEDICARE

## 2023-02-01 ENCOUNTER — APPOINTMENT (OUTPATIENT)
Dept: GENERAL RADIOLOGY | Age: 73
End: 2023-02-01
Attending: SURGERY
Payer: MEDICARE

## 2023-02-01 ENCOUNTER — ANESTHESIA (OUTPATIENT)
Dept: OPERATING ROOM | Age: 73
End: 2023-02-01
Payer: MEDICARE

## 2023-02-01 ENCOUNTER — HOSPITAL ENCOUNTER (OUTPATIENT)
Dept: NUCLEAR MEDICINE | Age: 73
Discharge: HOME | End: 2023-02-03
Payer: MEDICARE

## 2023-02-01 VITALS
HEIGHT: 62 IN | BODY MASS INDEX: 30.36 KG/M2 | OXYGEN SATURATION: 98 % | TEMPERATURE: 97.3 F | SYSTOLIC BLOOD PRESSURE: 165 MMHG | HEART RATE: 74 BPM | RESPIRATION RATE: 14 BRPM | DIASTOLIC BLOOD PRESSURE: 79 MMHG | WEIGHT: 165 LBS

## 2023-02-01 DIAGNOSIS — C50.912 MALIGNANT NEOPLASM OF LEFT BREAST IN FEMALE, ESTROGEN RECEPTOR POSITIVE, UNSPECIFIED SITE OF BREAST (HCC): ICD-10-CM

## 2023-02-01 DIAGNOSIS — Z17.0 MALIGNANT NEOPLASM OF LEFT BREAST IN FEMALE, ESTROGEN RECEPTOR POSITIVE, UNSPECIFIED SITE OF BREAST (HCC): ICD-10-CM

## 2023-02-01 DIAGNOSIS — C50.912 MALIGNANT NEOPLASM OF LEFT BREAST (HCC): ICD-10-CM

## 2023-02-01 PROCEDURE — 2500000003 HC RX 250 WO HCPCS: Performed by: SURGERY

## 2023-02-01 PROCEDURE — 2580000003 HC RX 258

## 2023-02-01 PROCEDURE — 19285 PERQ DEV BREAST 1ST US IMAG: CPT

## 2023-02-01 PROCEDURE — 2500000003 HC RX 250 WO HCPCS

## 2023-02-01 PROCEDURE — 6360000002 HC RX W HCPCS: Performed by: SURGERY

## 2023-02-01 PROCEDURE — 6360000002 HC RX W HCPCS

## 2023-02-01 PROCEDURE — A9520 TC99 TILMANOCEPT DIAG 0.5MCI: HCPCS

## 2023-02-01 PROCEDURE — 2580000003 HC RX 258: Performed by: SURGERY

## 2023-02-01 PROCEDURE — 3600000013 HC SURGERY LEVEL 3 ADDTL 15MIN: Performed by: SURGERY

## 2023-02-01 PROCEDURE — 7100000010 HC PHASE II RECOVERY - FIRST 15 MIN: Performed by: SURGERY

## 2023-02-01 PROCEDURE — 3700000000 HC ANESTHESIA ATTENDED CARE: Performed by: SURGERY

## 2023-02-01 PROCEDURE — 76098 X-RAY EXAM SURGICAL SPECIMEN: CPT

## 2023-02-01 PROCEDURE — 7100000011 HC PHASE II RECOVERY - ADDTL 15 MIN: Performed by: SURGERY

## 2023-02-01 PROCEDURE — 3600000003 HC SURGERY LEVEL 3 BASE: Performed by: SURGERY

## 2023-02-01 PROCEDURE — A9520 TC99 TILMANOCEPT DIAG 0.5MCI: HCPCS | Performed by: RADIOLOGY

## 2023-02-01 PROCEDURE — 6370000000 HC RX 637 (ALT 250 FOR IP): Performed by: SURGERY

## 2023-02-01 PROCEDURE — 3430000000 HC RX DIAGNOSTIC RADIOPHARMACEUTICAL: Performed by: RADIOLOGY

## 2023-02-01 PROCEDURE — 7100000001 HC PACU RECOVERY - ADDTL 15 MIN: Performed by: SURGERY

## 2023-02-01 PROCEDURE — 7100000000 HC PACU RECOVERY - FIRST 15 MIN: Performed by: SURGERY

## 2023-02-01 PROCEDURE — 88307 TISSUE EXAM BY PATHOLOGIST: CPT

## 2023-02-01 PROCEDURE — 38525 BIOPSY/REMOVAL LYMPH NODES: CPT | Performed by: SURGERY

## 2023-02-01 PROCEDURE — 6370000000 HC RX 637 (ALT 250 FOR IP)

## 2023-02-01 PROCEDURE — 19301 PARTIAL MASTECTOMY: CPT | Performed by: SURGERY

## 2023-02-01 PROCEDURE — 2709999900 HC NON-CHARGEABLE SUPPLY: Performed by: SURGERY

## 2023-02-01 PROCEDURE — 38792 RA TRACER ID OF SENTINL NODE: CPT

## 2023-02-01 PROCEDURE — 77065 DX MAMMO INCL CAD UNI: CPT

## 2023-02-01 PROCEDURE — 2720000010 HC SURG SUPPLY STERILE: Performed by: SURGERY

## 2023-02-01 PROCEDURE — 3430000000 HC RX DIAGNOSTIC RADIOPHARMACEUTICAL

## 2023-02-01 PROCEDURE — 38900 IO MAP OF SENT LYMPH NODE: CPT | Performed by: SURGERY

## 2023-02-01 PROCEDURE — 3700000001 HC ADD 15 MINUTES (ANESTHESIA): Performed by: SURGERY

## 2023-02-01 RX ORDER — DEXAMETHASONE SODIUM PHOSPHATE 10 MG/ML
INJECTION INTRAMUSCULAR; INTRAVENOUS PRN
Status: DISCONTINUED | OUTPATIENT
Start: 2023-02-01 | End: 2023-02-01 | Stop reason: SDUPTHER

## 2023-02-01 RX ORDER — SODIUM CHLORIDE, SODIUM LACTATE, POTASSIUM CHLORIDE, CALCIUM CHLORIDE 600; 310; 30; 20 MG/100ML; MG/100ML; MG/100ML; MG/100ML
INJECTION, SOLUTION INTRAVENOUS CONTINUOUS PRN
Status: DISCONTINUED | OUTPATIENT
Start: 2023-02-01 | End: 2023-02-01 | Stop reason: SDUPTHER

## 2023-02-01 RX ORDER — PROPOFOL 10 MG/ML
INJECTION, EMULSION INTRAVENOUS PRN
Status: DISCONTINUED | OUTPATIENT
Start: 2023-02-01 | End: 2023-02-01 | Stop reason: SDUPTHER

## 2023-02-01 RX ORDER — HYDRALAZINE HYDROCHLORIDE 20 MG/ML
5 INJECTION INTRAMUSCULAR; INTRAVENOUS
Status: DISCONTINUED | OUTPATIENT
Start: 2023-02-01 | End: 2023-02-01 | Stop reason: HOSPADM

## 2023-02-01 RX ORDER — ONDANSETRON 2 MG/ML
INJECTION INTRAMUSCULAR; INTRAVENOUS PRN
Status: DISCONTINUED | OUTPATIENT
Start: 2023-02-01 | End: 2023-02-01 | Stop reason: SDUPTHER

## 2023-02-01 RX ORDER — GLYCOPYRROLATE 0.2 MG/ML
INJECTION INTRAMUSCULAR; INTRAVENOUS PRN
Status: DISCONTINUED | OUTPATIENT
Start: 2023-02-01 | End: 2023-02-01 | Stop reason: SDUPTHER

## 2023-02-01 RX ORDER — LABETALOL HYDROCHLORIDE 5 MG/ML
5 INJECTION, SOLUTION INTRAVENOUS
Status: DISCONTINUED | OUTPATIENT
Start: 2023-02-01 | End: 2023-02-01 | Stop reason: HOSPADM

## 2023-02-01 RX ORDER — DIPHENHYDRAMINE HYDROCHLORIDE 50 MG/ML
12.5 INJECTION INTRAMUSCULAR; INTRAVENOUS
Status: DISCONTINUED | OUTPATIENT
Start: 2023-02-01 | End: 2023-02-01 | Stop reason: HOSPADM

## 2023-02-01 RX ORDER — ACETAMINOPHEN 325 MG/1
TABLET ORAL
Status: COMPLETED
Start: 2023-02-01 | End: 2023-02-01

## 2023-02-01 RX ORDER — FENTANYL CITRATE 50 UG/ML
INJECTION, SOLUTION INTRAMUSCULAR; INTRAVENOUS PRN
Status: DISCONTINUED | OUTPATIENT
Start: 2023-02-01 | End: 2023-02-01 | Stop reason: SDUPTHER

## 2023-02-01 RX ORDER — NEOSTIGMINE METHYLSULFATE 1 MG/ML
INJECTION, SOLUTION INTRAVENOUS PRN
Status: DISCONTINUED | OUTPATIENT
Start: 2023-02-01 | End: 2023-02-01 | Stop reason: SDUPTHER

## 2023-02-01 RX ORDER — SODIUM CHLORIDE 0.9 % (FLUSH) 0.9 %
5-40 SYRINGE (ML) INJECTION EVERY 12 HOURS SCHEDULED
Status: DISCONTINUED | OUTPATIENT
Start: 2023-02-01 | End: 2023-02-01 | Stop reason: HOSPADM

## 2023-02-01 RX ORDER — ONDANSETRON 2 MG/ML
4 INJECTION INTRAMUSCULAR; INTRAVENOUS
Status: DISCONTINUED | OUTPATIENT
Start: 2023-02-01 | End: 2023-02-01 | Stop reason: HOSPADM

## 2023-02-01 RX ORDER — LIDOCAINE HYDROCHLORIDE 20 MG/ML
INJECTION, SOLUTION INTRAVENOUS PRN
Status: DISCONTINUED | OUTPATIENT
Start: 2023-02-01 | End: 2023-02-01 | Stop reason: SDUPTHER

## 2023-02-01 RX ORDER — SODIUM CHLORIDE, SODIUM LACTATE, POTASSIUM CHLORIDE, CALCIUM CHLORIDE 600; 310; 30; 20 MG/100ML; MG/100ML; MG/100ML; MG/100ML
INJECTION, SOLUTION INTRAVENOUS CONTINUOUS
Status: DISCONTINUED | OUTPATIENT
Start: 2023-02-01 | End: 2023-02-01 | Stop reason: HOSPADM

## 2023-02-01 RX ORDER — DROPERIDOL 2.5 MG/ML
0.62 INJECTION, SOLUTION INTRAMUSCULAR; INTRAVENOUS
Status: DISCONTINUED | OUTPATIENT
Start: 2023-02-01 | End: 2023-02-01 | Stop reason: HOSPADM

## 2023-02-01 RX ORDER — ROCURONIUM BROMIDE 10 MG/ML
INJECTION, SOLUTION INTRAVENOUS PRN
Status: DISCONTINUED | OUTPATIENT
Start: 2023-02-01 | End: 2023-02-01 | Stop reason: SDUPTHER

## 2023-02-01 RX ORDER — ACETAMINOPHEN 500 MG
1000 TABLET ORAL ONCE
Status: COMPLETED | OUTPATIENT
Start: 2023-02-01 | End: 2023-02-01

## 2023-02-01 RX ORDER — SODIUM CHLORIDE 9 MG/ML
25 INJECTION, SOLUTION INTRAVENOUS PRN
Status: DISCONTINUED | OUTPATIENT
Start: 2023-02-01 | End: 2023-02-01 | Stop reason: HOSPADM

## 2023-02-01 RX ORDER — MIDAZOLAM HYDROCHLORIDE 2 MG/2ML
2 INJECTION, SOLUTION INTRAMUSCULAR; INTRAVENOUS
Status: DISCONTINUED | OUTPATIENT
Start: 2023-02-01 | End: 2023-02-01 | Stop reason: HOSPADM

## 2023-02-01 RX ORDER — BUPIVACAINE HYDROCHLORIDE AND EPINEPHRINE 2.5; 5 MG/ML; UG/ML
INJECTION, SOLUTION EPIDURAL; INFILTRATION; INTRACAUDAL; PERINEURAL PRN
Status: DISCONTINUED | OUTPATIENT
Start: 2023-02-01 | End: 2023-02-01 | Stop reason: ALTCHOICE

## 2023-02-01 RX ORDER — EPHEDRINE SULFATE/0.9% NACL/PF 50 MG/5 ML
SYRINGE (ML) INTRAVENOUS PRN
Status: DISCONTINUED | OUTPATIENT
Start: 2023-02-01 | End: 2023-02-01 | Stop reason: SDUPTHER

## 2023-02-01 RX ORDER — IPRATROPIUM BROMIDE AND ALBUTEROL SULFATE 2.5; .5 MG/3ML; MG/3ML
1 SOLUTION RESPIRATORY (INHALATION)
Status: DISCONTINUED | OUTPATIENT
Start: 2023-02-01 | End: 2023-02-01 | Stop reason: HOSPADM

## 2023-02-01 RX ORDER — MIDAZOLAM HYDROCHLORIDE 1 MG/ML
INJECTION INTRAMUSCULAR; INTRAVENOUS PRN
Status: DISCONTINUED | OUTPATIENT
Start: 2023-02-01 | End: 2023-02-01 | Stop reason: SDUPTHER

## 2023-02-01 RX ORDER — ACETAMINOPHEN 325 MG/1
650 TABLET ORAL
Status: COMPLETED | OUTPATIENT
Start: 2023-02-01 | End: 2023-02-01

## 2023-02-01 RX ORDER — SODIUM CHLORIDE 0.9 % (FLUSH) 0.9 %
5-40 SYRINGE (ML) INJECTION PRN
Status: DISCONTINUED | OUTPATIENT
Start: 2023-02-01 | End: 2023-02-01 | Stop reason: HOSPADM

## 2023-02-01 RX ORDER — SODIUM CHLORIDE 9 MG/ML
INJECTION, SOLUTION INTRAVENOUS PRN
Status: DISCONTINUED | OUTPATIENT
Start: 2023-02-01 | End: 2023-02-01 | Stop reason: HOSPADM

## 2023-02-01 RX ADMIN — LIDOCAINE HYDROCHLORIDE 50 MG: 20 INJECTION, SOLUTION INTRAVENOUS at 14:33

## 2023-02-01 RX ADMIN — Medication 10 MG: at 14:47

## 2023-02-01 RX ADMIN — GLYCOPYRROLATE 0.6 MG: 0.2 INJECTION INTRAMUSCULAR; INTRAVENOUS at 16:01

## 2023-02-01 RX ADMIN — ROCURONIUM BROMIDE 20 MG: 50 INJECTION INTRAVENOUS at 14:41

## 2023-02-01 RX ADMIN — ACETAMINOPHEN 650 MG: 325 TABLET ORAL at 18:00

## 2023-02-01 RX ADMIN — ROCURONIUM BROMIDE 30 MG: 50 INJECTION INTRAVENOUS at 14:33

## 2023-02-01 RX ADMIN — FENTANYL CITRATE 25 MCG: 50 INJECTION, SOLUTION INTRAMUSCULAR; INTRAVENOUS at 15:41

## 2023-02-01 RX ADMIN — SODIUM CHLORIDE, POTASSIUM CHLORIDE, SODIUM LACTATE AND CALCIUM CHLORIDE: 600; 310; 30; 20 INJECTION, SOLUTION INTRAVENOUS at 10:13

## 2023-02-01 RX ADMIN — ONDANSETRON 4 MG: 2 INJECTION INTRAMUSCULAR; INTRAVENOUS at 15:54

## 2023-02-01 RX ADMIN — DEXAMETHASONE SODIUM PHOSPHATE 10 MG: 10 INJECTION INTRAMUSCULAR; INTRAVENOUS at 14:45

## 2023-02-01 RX ADMIN — ACETAMINOPHEN 650 MG: 325 TABLET, FILM COATED ORAL at 18:00

## 2023-02-01 RX ADMIN — ACETAMINOPHEN 1000 MG: 500 TABLET ORAL at 10:00

## 2023-02-01 RX ADMIN — SODIUM CHLORIDE, POTASSIUM CHLORIDE, SODIUM LACTATE AND CALCIUM CHLORIDE: 600; 310; 30; 20 INJECTION, SOLUTION INTRAVENOUS at 15:30

## 2023-02-01 RX ADMIN — PROPOFOL 160 MG: 10 INJECTION, EMULSION INTRAVENOUS at 14:33

## 2023-02-01 RX ADMIN — SODIUM CHLORIDE, POTASSIUM CHLORIDE, SODIUM LACTATE AND CALCIUM CHLORIDE: 600; 310; 30; 20 INJECTION, SOLUTION INTRAVENOUS at 14:21

## 2023-02-01 RX ADMIN — Medication 0.25 MG: at 17:10

## 2023-02-01 RX ADMIN — VANCOMYCIN HYDROCHLORIDE 1000 MG: 1 INJECTION, POWDER, LYOPHILIZED, FOR SOLUTION INTRAVENOUS at 13:22

## 2023-02-01 RX ADMIN — Medication 3 MG: at 16:01

## 2023-02-01 RX ADMIN — TILMANOCEPT 0.59 MILLICURIE: KIT at 11:30

## 2023-02-01 RX ADMIN — FENTANYL CITRATE 100 MCG: 50 INJECTION, SOLUTION INTRAMUSCULAR; INTRAVENOUS at 14:33

## 2023-02-01 RX ADMIN — MIDAZOLAM 2 MG: 1 INJECTION INTRAMUSCULAR; INTRAVENOUS at 14:26

## 2023-02-01 RX ADMIN — FENTANYL CITRATE 25 MCG: 50 INJECTION, SOLUTION INTRAMUSCULAR; INTRAVENOUS at 15:39

## 2023-02-01 RX ADMIN — HYDROMORPHONE HYDROCHLORIDE 0.25 MG: 1 INJECTION, SOLUTION INTRAMUSCULAR; INTRAVENOUS; SUBCUTANEOUS at 17:10

## 2023-02-01 ASSESSMENT — PAIN DESCRIPTION - PAIN TYPE
TYPE: SURGICAL PAIN

## 2023-02-01 ASSESSMENT — PAIN SCALES - GENERAL
PAINLEVEL_OUTOF10: 5
PAINLEVEL_OUTOF10: 2
PAINLEVEL_OUTOF10: 4
PAINLEVEL_OUTOF10: 5

## 2023-02-01 ASSESSMENT — PAIN DESCRIPTION - FREQUENCY
FREQUENCY: INTERMITTENT
FREQUENCY: INTERMITTENT

## 2023-02-01 ASSESSMENT — PAIN DESCRIPTION - LOCATION
LOCATION: BREAST

## 2023-02-01 ASSESSMENT — PAIN DESCRIPTION - ORIENTATION
ORIENTATION: LEFT

## 2023-02-01 ASSESSMENT — PAIN DESCRIPTION - ONSET: ONSET: AWAKENED FROM SLEEP

## 2023-02-01 ASSESSMENT — PAIN DESCRIPTION - DESCRIPTORS
DESCRIPTORS: ACHING;BURNING
DESCRIPTORS: ACHING;DISCOMFORT
DESCRIPTORS: DULL

## 2023-02-01 ASSESSMENT — PAIN - FUNCTIONAL ASSESSMENT: PAIN_FUNCTIONAL_ASSESSMENT: 0-10

## 2023-02-01 NOTE — OP NOTE
Operative Note      Patient: Lito Kelly  YOB: 1950  MRN: [id number]    Date of Procedure: 2/1/2023    Pre-Op Diagnosis: Malignant neoplasm of left breast ([facility] [address] [C50.912]LOQ    Post-Op Diagnosis: Same       Procedure(s):  left blue dye injection, left localized breast lumpectomy, left axillary sentinel lymph node excision,    Surgeon(s):  Katherin Pennington MD    Assistant:   Resident: Ashlyn Rojas DO    Anesthesia: General    Estimated Blood Loss (mL): less than 50     Complications: None    Specimens:   ID Type Source Tests Collected by Time Destination   A : LEFT LOWER OUTER QUADRANT LUMPECTOMY  Tissue Tissue SURGICAL PATHOLOGY Katherin Pennington MD 2/1/2023 1508    B : SENTINEL LYMPH NODE #1 [124] Tissue Tissue SURGICAL PATHOLOGY Katherin Pennington MD 2/1/2023 1546        Implants:  * No implants in log *      Drains:   Ileostomy Loop ileostomy LLQ (Active)       Findings: Procedure done with curative intent yes  Agent(s) utilized for sentinel lymph node identification: Blue dye and Radioactive tracer  Agent(s) utilized for sentinel lymph node identification after neoadjuvant chemotherapy: Not Applicable  All colored nodes or non-colored nodes present at the end of a dye-filled lymphatic channel were removed: N/A  All significantly radioactive nodes were removed if radionuclide was used: yes  All palpably suspicious nodes were removed, if present: Yes  If clips were placed preoperatively in pathologically-involved nodes, those nodes were identified and removed: N/A     Detailed Description of Procedure:   HISTORY: The patient presented with mammographic density in her  Left breast which on biopsy proved to be infiltrating ductal carcinoma. Her metastatic workup was negative. After extensive discussion, the patient   opted to undergo a lumpectomy and sentinel lymph node excision.   She underwent localization and injection of Lymphoseek in the breast care center, and informed consent obtained in the preoperative holding area. PROCEDURE: The patient was brought to the operating room from preop, and   received antibiotics intravenously. PCDs were in place on her lower   extremities. A bear hugger was placed over her abdomen. After induction of   general anesthesia, the Left breast, chest wall, Left arm, shoulder and   medial Right breast were prepped with ChloraPrep. After 3 minutes the   patient was draped in the usual sterile fashion. 3 mL of dilute methylene blue dye was injected in the retroareolar portion of the   left breast and left breast was massaged for 5 minutes. Attention was then turned to the breast.  The site of incision was identified following skin lines in the lower outer quadrant, overlying an area of skin retraction from the tumor. The area was infiltrated widely with Marcaine prior to incision with the PlasmaBlade. The PlasmaBlade was utilized to develop a lumpectomy specimen surrounding the localization needle and wire. Care was taken to obtain meticulous hemostasis. Bleeding points were all cauterized. Circumferentially excellent margins   were obtained. The specimen was removed and brought to the back table where   6 colors of paint were applied to orient it. The wound was irrigated,   inspected, bleeding points were cauterized. Once we were sure that it was   completely dry the margins were marked with stainless steel clips. The dermis was closed with 4-0 Vicryl interrupted suture. The skin was   closed with a running subcuticular stitch of 5-0 Vicryl. A curvilinear incision was made   following skin lines in the axilla after infiltrating with Marcaine, at the point of maximal radioactivity. Very carefully, enlarged axillary lymph nodes were dissected out. Efferent and afferent lymphovascular channels were clipped with stainless steel Surgiclips. The wound was copiously irrigated once all palpable lymph nodes were removed.   After the sentinel lymph node was removed there was no additional radioactivity in the axilla. The area was irrigated. Bleeding points were cauterized. 0.5% Marcaine was instilled in the wound prior to closure. The dermis was closed with interrupted 4-0 Vicryl and the skin was closed   with a running subcuticular stitch of 5-0 Vicryl. Steri-Strips and   sterile dressings were applied to both sites. The patient tolerated the   procedure well, was extubated and went to recovery in good condition. Dictated by: Amanda Nair. Jesse Monaco M.D.   Electronically signed by Salvador Marte MD  on 2/1/2023 at 4:24 PM        Electronically signed by Salvador Marte MD on 2/1/2023 at 4:23 PM

## 2023-02-01 NOTE — DISCHARGE INSTRUCTIONS
Ice to incisions for 24 hours as needed for comfort. Remove external dressings in 24-36 hours. Shower in 24-36 hours after removing external dressings. Heat to incisions for comfort after 24 hours. Follow up with Dr. Shimon Mckenzie in 2-3 weeks. Dr. Shimon Mckenzie will call to check on you in the next day or so and will also call when your pathology report is back. Do not drive while taking pain medication.

## 2023-02-01 NOTE — PROGRESS NOTES
Dr. Chamberlain Records walking marked specimen over to [facility] [phone]

## 2023-02-01 NOTE — ANESTHESIA PRE PROCEDURE
Department of Anesthesiology  Preprocedure Note       Name:  Zora Romero   Age:  67 y.o.  :  1950                                          MRN:  [id number]         Date:  2023      Surgeon: Ciarra Browne):  Priya Christensen MD    Procedure: Procedure(s):  Left breast needle localized lumpectomy - blue dye injection - left axillary sentinel lymph node excision - possible left axillary dissection - General [facility]ident/neoprobe    Medications prior to admission:   Prior to Admission medications    Medication Sig Start Date End Date Taking?  Authorizing Provider   allopurinol (ZYLOPRIM) 100 MG tablet Take by mouth 22   Historical Provider, MD   hydrOXYzine HCl (ATARAX) 10 MG tablet Take by mouth  Patient not taking: No sig reported 22   Historical Provider, MD   levocetirizine (XYZAL) 5 MG tablet Take by mouth  Patient not taking: No sig reported 22   Historical Provider, MD   Magnesium 400 MG TABS Take 400 mg by mouth daily    Historical Provider, MD   sodium chloride 1 g tablet Take 1 g by mouth 3 times daily    Historical Provider, MD   NONFORMULARY in the morning, at noon, and at bedtime collogen and vitamin c    Historical Provider, MD   Multiple Vitamins-Minerals (THERAPEUTIC MULTIVITAMIN-MINERALS) tablet Take 1 tablet by mouth daily    Historical Provider, MD   metoprolol succinate (TOPROL XL) 25 MG extended release tablet Take 0.5 tablets by mouth daily  Patient taking differently: Take 12.5 mg by mouth at bedtime 22   Temple Prior MD [place]   ferrous sulfate (IRON 325) 325 (65 Fe) MG tablet ferrous sulfate Ferrous Sulfate Active 325 MG Oral Daily After A Meal  10:46am 10-  [facility] ([id number]) 10/23/20   Historical Provider, MD   apixaban (ELIQUIS) 5 MG TABS tablet Take 1 tablet by mouth 2 times daily 10/2/20   Sue Hines MD   lovastatin (MEVACOR) 40 MG tablet Take 1 tablet by mouth nightly 8/3/20   Albert Hancock, APRN - CNP   PROAIR HFA 108 (90 Base) MCG/ACT inhaler Inhale 1 puff into the lungs 4 times daily 6/25/20   Yessi Sosa MD   Cholecalciferol (VITAMIN D3) 25 MCG (1000 UT) CAPS Take by mouth daily    Historical Provider, MD   Calcium Carbonate (CALTRATE 600 PO) Take by mouth 2 times daily     Historical Provider, MD       Current medications:    Current Facility-Administered Medications   Medication Dose Route Frequency Provider Last Rate Last Admin    lactated ringers IV soln infusion   IntraVENous Continuous Kate Townsend  mL/hr at 02/01/23 1342 NoRateChange at 02/01/23 1342    sodium chloride flush 0.9 % injection 5-40 mL  5-40 mL IntraVENous 2 times per day Kate Townsend MD        sodium chloride flush 0.9 % injection 5-40 mL  5-40 mL IntraVENous PRN aKte Townsend MD        0.9 % sodium chloride infusion   IntraVENous PRN Kate Townsend MD         Facility-Administered Medications Ordered in Other Encounters   Medication Dose Route Frequency Provider Last Rate Last Admin    technetium Tc 99m tilmanocept (LYMPHOSEEK) injection 2.51 millicurie  496 micro curie IntraDERmal ONCE PRN Gianna Nava MD   3.77 millicurie at 19/20/39 8357       Allergies:     Allergies   Allergen Reactions    Amlodipine      Hives      Cefdinir     E-Mycin [Erythromycin]      Made her dizzy and she didn't feel well    Lisinopril     Seasonal        Problem List:    Patient Active Problem List   Diagnosis Code    Mixed hyperlipidemia E78.2    Essential hypertension I10    LBBB (left bundle branch block) I44.7    Chronic deep vein thrombosis (DVT) of lower extremity ([facility]) I82.509    Personal history of pulmonary embolism Z86.711    Type 2 diabetes mellitus with complication, without long-term current use of insulin ([facility]) E11.8    Chronic diastolic (congestive) heart failure ([facility]) I50.32    Vitamin D deficiency E55.9    Venous insufficiency I87.2    Age-related osteoporosis without current pathological fracture M81.0    Other specified hypothyroidism E03.8    Primary generalized (osteo)arthritis M15.0    Chronic pain syndrome G89.4    Morbid obesity with BMI of 45.0-49.9, adult ([facility]) E66.01, Z68.42    Seasonal allergic rhinitis due to pollen J30.1    Moderate persistent asthma with acute exacerbation J45.41    Class 3 severe obesity due to excess calories with serious comorbidity and body mass index (BMI) of 45.0 to 49.9 in adult ([facility] E66.01, Z68.42    Encounter for immunization Z23    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    Iron deficiency anemia due to chronic blood loss D50.0    Acute kidney injury ([facility] N17.9    CKD (chronic kidney disease) stage 3, GFR 30-59 ml/min ([facility]) N18.30    Hyponatremia E87.1    Hyperkalemia E87.5    Hypotension I95.9    Prolonged Q-T interval on ECG R94.31    Sepsis ([facility]) A41.9    Severe protein-calorie malnutrition ([facility]) E43    Malignant neoplasm of left breast ([facility]) C50.912       Past Medical History:        Diagnosis Date    Colon cancer ([facility] 11/01/2020    Hyperlipidemia     LBBB (left bundle branch block)     Obesity        Past Surgical History:        Procedure Laterality Date    APPENDECTOMY      CHOLECYSTECTOMY      COLON SURGERY N/A 11/2020    COLOSTOMY  01/01/2007    due to divertiulosis since than it was reversed    HERNIA REPAIR      HYSTERECTOMY, TOTAL ABDOMINAL (CERVIX REMOVED)  01/01/1990    US BREAST BIOPSY W LOC DEVICE 1ST LESION LEFT Left 09/06/2022    Benign    US BREAST BIOPSY W LOC DEVICE 1ST LESION LEFT Left 11/21/2022    US BREAST NEEDLE BIOPSY LEFT 11/21/2022 [facility]    US GUIDED NEEDLE LOC OF LEFT BREAST Left 2/1/2023    US GUIDED NEEDLE LOC OF LEFT BREAST 2/1/2023 [facility]       Social History:    Social History     Tobacco Use    Smoking status: Former     Packs/day: 1.00     Years: 40.00     Pack years: 40.00     Types: Cigarettes     Quit date: 1/1/2008     Years since quitting: 15.0    Smokeless tobacco: Never   Substance Use Topics    Alcohol use: Yes     Comment: rarely                                Counseling given: Not Answered      Vital Signs (Current):   Vitals:    01/25/23 1013 02/01/23 0951   BP:  (!) 184/76   Pulse:  76   Resp:  18   Temp:  36.8 °C (98.2 °F)   TempSrc:  Temporal   SpO2:  99%   Weight: 165 lb (74.8 kg) 165 lb (74.8 kg)   Height: 5' 2\" (1.575 m) 5' 2\" (1.575 m)                                              BP Readings from Last 3 Encounters:   02/01/23 (!) 184/76   12/16/22 (!) 168/75   12/12/22 132/64       NPO Status: Time of last liquid consumption: 2300                        Time of last solid consumption: 1900                        Date of last liquid consumption: 01/31/23                        Date of last solid food consumption: 01/31/23    BMI:   Wt Readings from Last 3 Encounters:   02/01/23 165 lb (74.8 kg)   01/23/23 170 lb (77.1 kg)   12/16/22 170 lb 1.6 oz (77.2 kg)     Body mass index is 30.18 kg/m². CBC:   Lab Results   Component Value Date/Time    WBC 5.3 12/16/2022 09:30 AM    RBC 4.03 12/16/2022 09:30 AM    HGB 12.5 12/16/2022 09:30 AM    HCT 37.3 12/16/2022 09:30 AM    MCV 92.6 12/16/2022 09:30 AM    RDW 12.3 12/16/2022 09:30 AM     12/16/2022 09:30 AM       CMP:   Lab Results   Component Value Date/Time     06/18/2022 07:11 PM    K 4.6 06/18/2022 07:11 PM    K 6.1 03/02/2021 12:56 PM    CL 93 06/18/2022 07:11 PM    CO2 35 06/18/2022 07:11 PM    BUN 40 06/18/2022 07:11 PM    CREATININE 1.2 06/18/2022 07:11 PM    GFRAA 59 03/11/2021 08:34 AM    AGRATIO 1.1 03/30/2022 05:12 AM    LABGLOM 44 06/18/2022 07:11 PM    GLUCOSE 122 06/18/2022 07:11 PM    PROT 6.2 03/30/2022 05:12 AM    CALCIUM 9.5 06/18/2022 07:11 PM    BILITOT 0.8 03/30/2022 05:12 AM    ALKPHOS 41 03/30/2022 05:12 AM    AST 23 03/30/2022 05:12 AM    ALT 11 03/30/2022 05:12 AM       POC Tests: No results for input(s): POCGLU, POCNA, POCK, POCCL, POCBUN, POCHEMO, POCHCT in the last 72 hours.     Coags:   Lab Results   Component Value Date/Time    PROTIME 16.6 11/19/2021 02:20 PM    INR 1.50 11/19/2021 02:20 PM    APTT 38.3 11/19/2021 02:20 PM       HCG (If Applicable): No results found for: PREGTESTUR, PREGSERUM, HCG, HCGQUANT     ABGs:   Lab Results   Component Value Date/Time    PHART 7.46 11/27/2020 08:21 AM    PO2ART 225 11/27/2020 08:21 AM    CEU8ERX 43 11/27/2020 08:21 AM    ZGA8ICX 30.6 11/27/2020 08:21 AM    BEART 6.1 11/27/2020 08:21 AM    O7MJVRMU 100 11/27/2020 08:21 AM        Type & Screen (If Applicable):  No results found for: LABABO, LABRH    Drug/Infectious Status (If Applicable):  No results found for: HIV, HEPCAB    COVID-19 Screening (If Applicable):   Lab Results   Component Value Date/Time    COVID19 Not Detected 03/10/2021 03:51 PM           Anesthesia Evaluation  Patient summary reviewed no history of anesthetic complications:   Airway: Mallampati: II  TM distance: >3 FB   Neck ROM: full  Mouth opening: > = 3 FB   Dental:          Pulmonary:normal exam  breath sounds clear to auscultation  (+) asthma: seasonal asthma,                            Cardiovascular:    (+) hypertension:, CHF:, hyperlipidemia    Dysrhythmias:  LBBB. ECG reviewed  Rhythm: regular  Rate: normal  Echocardiogram reviewed         Beta Blocker:  Dose within 24 Hrs         Neuro/Psych:               GI/Hepatic/Renal:   (+) renal disease: CRI,          ROS comment: Colostomy and mucous fistula . Endo/Other:    (+) blood dyscrasia ( Last Dose 1/26/23 ): anticoagulation therapy and Factor V:., malignancy/cancer ( Hx Colon CA. Malignant neoplasm of left breast). Abdominal:             Vascular:   + DVT, . Other Findings:      4/26/22 EKG  Sinus  Rhythm  - frequent ectopic ventricular beat s   # VECs = 3  -Intraventricular conduction defect -consider left ventricular hypertrophy.    -Old anteroseptal infarct.    -Nonspecific ST depression  -Nondiagnostic.     8/17/20 ECHO  Summary   Ejection fraction is visually estimated at 55-65%. The left atrium is mildly dilated. L atrial pressure not elevated   Trace centrally directed mitral regurgitation. Mild aortic stenosis. No pericardial effusion demonstrated. 3/28/22 CXR  IMPRESSION:             Chest, Portable:             1. COPD.      2. No active pulmonary disease.                   Anesthesia Plan      general     ASA 3       Induction: intravenous. BIS    Anesthetic plan and risks discussed with patient. Use of blood products discussed with patient whom consented to blood products.                      Srinath Hurt RN   2/1/2023

## 2023-02-02 NOTE — ANESTHESIA POSTPROCEDURE EVALUATION
Department of Anesthesiology  Postprocedure Note    Patient: John Mendez  MRN: [id number]  YOB: 1950  Date of evaluation: 2/1/2023      Procedure Summary     Date: 02/01/23 Room / Location: [place] [address] / [place] [facility]    Anesthesia Start: 2822 Anesthesia Stop: 0041    Procedure: left blue dye injection, left localized breast lumpectomy, left axillary sentinel lymph node excision, (Left: Breast) Diagnosis:       Malignant neoplasm of left breast ([address]      (Malignant neoplasm of left breast ([address] Susie Hartley)    Surgeons: Nesha Blair MD Responsible Provider: Kavita Calderon MD    Anesthesia Type: general ASA Status: 3          Anesthesia Type: No value filed.     Eduardo Phase I: Eduardo Score: 9    Eduardo Phase II: Eduardo Score: 10      Anesthesia Post Evaluation    Patient location during evaluation: PACU  Patient participation: complete - patient participated  Level of consciousness: awake and alert  Airway patency: patent  Nausea & Vomiting: no nausea and no vomiting  Complications: no  Cardiovascular status: hemodynamically stable  Respiratory status: acceptable  Hydration status: euvolemic

## 2023-02-09 ENCOUNTER — TELEPHONE (OUTPATIENT)
Dept: SURGERY | Age: 73
End: 2023-02-09

## 2023-02-10 NOTE — TELEPHONE ENCOUNTER
Discussed pathology report. Patient voiced no complaints. Oncotype will be sent. Diagnosis:   A. Left breast, lower outer quadrant, excision: Invasive carcinoma, no   special type (ductal) and ductal carcinoma in situ, see comment. B. Left sentinel lymph node: Metastatic carcinoma involving single lymph   node. Comment:    CANCER CASE SUMMARY   Procedure -excision   Specimen Laterality -left   Tumor Site, Invasive Carcinoma-lower outer quadrant   Histologic Type -invasive carcinoma, no special type (ductal)   Histologic Grade ([place] Histologic Score):                    Tubule differentiation- score-3                    Nuclear pleomorphism- score 3                    Mitotic rate- score 2                    Overall Grade- grade 3, (score 8)   Tumor Size: Size of Largest Invasive Carcinoma-approximately 16 mm   Ductal Carcinoma In Situ (DCIS)-present, negative for extensive   intraductal component   Lymphovascular Invasion-not identified   Treatment Effect in the Breast-no known presurgical therapy   Margin status for invasive carcinoma:  All margins negative for invasive   carcinoma   Distance from invasive carcinoma to closest margin-2 mm/inferior   Margin Status for DCIS-all margins negative for DCIS   Distance from DCIS to closest margin (if applicable)-5 mm/medial   Regional Lymph Nodes: Tumor present in regional lymph node (sentinel)   Number of lymph nodes with macrometastases-1   Size of largest ariadna metastatic deposit 3 mm   Extranodal extension-not identified   Total number of lymph nodes examined-1 (sentinel)   Pathologic Stage Classification (AJCC 8th Edition)- pT1c, p(sn)N1a   Special studies-ER positive, PA positive, HER2 negative, Ki-67 index 70%

## 2023-02-13 NOTE — PROGRESS NOTES
Subjective:     Patient ID: Ernesto Noonan is a 67 y.o. female. HPI  History and Physical    Patient's Name/Date of Birth: Ernesto Noonan / 1950    Date: February 14, 2023       PCP: Giacomo Regalado MD, Gynecologist: none    Ernesto Noonan is here for a post-operative visit. She underwent a left blue dye injection, left localized breast lumpectomy, left axillary sentinel lymph node excision on February 1, 2023. Pathological evaluation completed at [facility]):    A. Left breast, lower outer quadrant, excision: Invasive carcinoma, no   special type (ductal) and ductal carcinoma in situ, see comment. B. Left sentinel lymph node: Metastatic carcinoma involving single lymph   node. Comment:    CANCER CASE SUMMARY   Procedure -excision   Specimen Laterality -left   Tumor Site, Invasive Carcinoma-lower outer quadrant   Histologic Type -invasive carcinoma, no special type (ductal)   Histologic Grade ([place] Histologic Score):                    Tubule differentiation- score-3                    Nuclear pleomorphism- score 3                    Mitotic rate- score 2                    Overall Grade- grade 3, (score 8)   Tumor Size: Size of Largest Invasive Carcinoma-approximately 16 mm   Ductal Carcinoma In Situ (DCIS)-present, negative for extensive   intraductal component   Lymphovascular Invasion-not identified   Treatment Effect in the Breast-no known presurgical therapy   Margin status for invasive carcinoma:  All margins negative for invasive   carcinoma   Distance from invasive carcinoma to closest margin-2 mm/inferior   Margin Status for DCIS-all margins negative for DCIS   Distance from DCIS to closest margin (if applicable)-5 mm/medial   Regional Lymph Nodes: Tumor present in regional lymph node (sentinel)   Number of lymph nodes with macrometastases-1   Size of largest ariadna metastatic deposit 3 mm   Extranodal extension-not identified   Total number of lymph nodes examined-1 (sentinel) Pathologic Stage Classification (AJCC 8th Edition)- pT1c, p(sn)N1a   Special studies-ER positive, [place] positive, HER2 negative, Ki-67 index 70%   Additional Note: Intradepartmental consultation is obtained. Pathology report discussed. The lesion is located in the 3 o'clock position of the left breast. The lesion was discovered by mammogram. The patient has not noted a change in BSE since presentation. Patient denies nipple discharge. Patient denies a personal history of breast cancer. Breast cancer risk factors include patient had colon cancer at 79, paternal grandmother colon cancer at 80, mom with lung cancer at 62, son kidney cancer, sister with leukemia. Ashkenazi Mu-ism Ancestry: No.    OBSTETRIC RELATED HISTORY:  Age of menarche was 15. Age of menopause was hysterectomy age 36. Patient admits to hormonal therapy. 5 years  Patient is . Age of first live birth was 21. Patient did breast feed. Is patient interested in fertility information about fertility preservation? No    CANCER SURVEILLANCE HISTORY:  Mammograms: Yes  Breast MRI's: No   Breast Biopsies: Yes   Colonoscopy: Yes   GI Polyps: Yes colon cancer 2 years ago, had surgery with colostomy  EGD: No   Pelvic Exam: No   Pap Smear: No   Dermatology: No   Lung screening: no      Have you received the flu vaccination this year? No  Have you received the Pneumococcal vaccination in the last 5 years? Yes  Have you received the COVID 19 vaccination this year? Yes    Estimated body mass index is 30.18 kg/m² as calculated from the following:    Height as of 23: 5' 2\" (1.575 m). Weight as of 23: 165 lb (74.8 kg). Bra Size: 42b    Because violence is so common, we ask all our patients: are you in a relationship or do you live with a person who threatens, hurts, or controls you:  Safe at home    Patient drinks little caffeinated beverages. Patient does not smoke cigarettes. Patient does not use recreational drugs.       Lymphedema screening completed. See documentation in the flow sheets.            Past Medical History:   Diagnosis Date    Colon cancer ([address] 11/01/2020    Hyperlipidemia     LBBB (left bundle branch block)     Obesity        Past Surgical History:   Procedure Laterality Date    APPENDECTOMY      BREAST BIOPSY Left 2/1/2023    left blue dye injection, left localized breast lumpectomy, left axillary sentinel lymph node excision, performed by Aiyana Ortez MD at [facility] 11/2020    COLOSTOMY  01/01/2007    due to divertiulosis since than it was reversed    HERNIA REPAIR      HYSTERECTOMY, TOTAL ABDOMINAL (CERVIX REMOVED)  01/01/1990    US BREAST BIOPSY W LOC DEVICE 1ST LESION LEFT Left 09/06/2022    Benign    US BREAST BIOPSY W LOC DEVICE 1ST LESION LEFT Left 11/21/2022    US BREAST NEEDLE BIOPSY LEFT 11/21/2022 [facility]    US GUIDED NEEDLE LOC OF LEFT BREAST Left 2/1/2023    US GUIDED NEEDLE LOC OF LEFT BREAST 2/1/2023 [facility]       Current Outpatient Medications   Medication Sig Dispense Refill    allopurinol (ZYLOPRIM) 100 MG tablet Take by mouth      hydrOXYzine HCl (ATARAX) 10 MG tablet Take by mouth (Patient not taking: No sig reported)      levocetirizine (XYZAL) 5 MG tablet Take by mouth (Patient not taking: No sig reported)      Magnesium 400 MG TABS Take 400 mg by mouth daily      sodium chloride 1 g tablet Take 1 g by mouth 3 times daily      NONFORMULARY in the morning, at noon, and at bedtime collogen and vitamin c      Multiple Vitamins-Minerals (THERAPEUTIC MULTIVITAMIN-MINERALS) tablet Take 1 tablet by mouth daily      metoprolol succinate (TOPROL XL) 25 MG extended release tablet Take 0.5 tablets by mouth daily (Patient taking differently: Take 12.5 mg by mouth at bedtime) 90 tablet 3    ferrous sulfate (IRON 325) 325 (65 Fe) MG tablet ferrous sulfate Ferrous Sulfate Active 325 MG Oral Daily After A Meal 100 October 23rd, 2020 10:46am 10-  [facility] [address] ([id number])      apixaban (ELIQUIS) 5 MG TABS tablet Take 1 tablet by mouth 2 times daily 84 tablet 0    lovastatin (MEVACOR) 40 MG tablet Take 1 tablet by mouth nightly 90 tablet 1    PROAIR  (90 Base) MCG/ACT inhaler Inhale 1 puff into the lungs 4 times daily 3 Inhaler 1    Cholecalciferol (VITAMIN D3) 25 MCG (1000 UT) CAPS Take by mouth daily      Calcium Carbonate (CALTRATE 600 PO) Take by mouth 2 times daily        Current Facility-Administered Medications   Medication Dose Route Frequency Provider Last Rate Last Admin    perflutren lipid microspheres (DEFINITY) injection 1.65 mg  1.5 mL IntraVENous ONCE PRN Rudi Mohamud MD           Allergies   Allergen Reactions    Amlodipine      Hives      Cefdinir     E-Mycin [Erythromycin]      Made her dizzy and she didn't feel well    Lisinopril     Seasonal        Family History   Problem Relation Age of Onset    Cancer Mother 62        lung and brain cancer    Diabetes Father     Kidney Disease Father     Cancer Sister 25        leukemia    Cancer Paternal Grandmother 80        colon cancer    Cancer Son 43        kidney cancer       Social History     Socioeconomic History    Marital status:      Spouse name: Not on file    Number of children: Not on file    Years of education: Not on file    Highest education level: Not on file   Occupational History    Not on file   Tobacco Use    Smoking status: Former     Packs/day: 1.00     Years: 40.00     Pack years: 40.00     Types: Cigarettes     Quit date: 1/1/2008     Years since quitting: 15.1    Smokeless tobacco: Never   Vaping Use    Vaping Use: Never used   Substance and Sexual Activity    Alcohol use: Yes     Comment: rarely    Drug use: No    Sexual activity: Not Currently   Other Topics Concern    Not on file   Social History Narrative    Not on file     Social Determinants of Health     Financial Resource Strain: Not on file   Food Insecurity: Not on file   Transportation Needs: Not on file   Physical Activity: Not on file   Stress: Not on file   Social Connections: Not on file   Intimate Partner Violence: Not on file   Housing Stability: Not on file       Occupation: Enjoys watching TV, spending time with 4 grandchildren       Review of Systems  CONSTITUTIONAL: No fever, chills. Good appetite and energy level. ENMT: Eyes: No diplopia; Nose: No epistaxis. Mouth: No sore throat. RESPIRATORY: No hemoptysis, shortness of breath, cough. CARDIOVASCULAR: No chest pain, pressure, or palpitations. GASTROINTESTINAL: No nausea/vomiting, abdominal pain, diarrhea/constipation. No blood in the stools. GENITOURINARY: No dysuria, urinary frequency, hematuria. NEURO: No syncope, presyncope, headache. 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 On Eliquis      The patient voices no complaints. With questioning, she denies significant pain, fever, chills, wound drainage or discharge. Objective:    Well-healed breast and axillary incisions. No erythema or seroma formation. Good range of motion left shoulder. Assessment:      67 y.o. woman who underwent a left blue dye injection, left localized breast lumpectomy, left axillary sentinel lymph node excision on February 1, 2023. Pathological evaluation completed at [facility]):    A. Left breast, lower outer quadrant, excision: Invasive carcinoma, no   special type (ductal) and ductal carcinoma in situ, see comment. B. Left sentinel lymph node: Metastatic carcinoma involving single lymph   node.      Comment:    CANCER CASE SUMMARY   Procedure -excision   Specimen Laterality -left   Tumor Site, Invasive Carcinoma-lower outer quadrant   Histologic Type -invasive carcinoma, no special type (ductal)   Histologic Grade ([place] Histologic Score):                    Tubule differentiation- score-3                    Nuclear pleomorphism- score 3                    Mitotic rate- score 2 Overall Grade- grade 3, (score 8)   Tumor Size: Size of Largest Invasive Carcinoma-approximately 16 mm   Ductal Carcinoma In Situ (DCIS)-present, negative for extensive   intraductal component   Lymphovascular Invasion-not identified   Treatment Effect in the Breast-no known presurgical therapy   Margin status for invasive carcinoma: All margins negative for invasive   carcinoma   Distance from invasive carcinoma to closest margin-2 mm/inferior   Margin Status for DCIS-all margins negative for DCIS   Distance from DCIS to closest margin (if applicable)-5 mm/medial   Regional Lymph Nodes: Tumor present in regional lymph node (sentinel)   Number of lymph nodes with macrometastases-1   Size of largest ariadna metastatic deposit 3 mm   Extranodal extension-not identified   Total number of lymph nodes examined-1 (sentinel)   Pathologic Stage Classification (AJCC 8th Edition)- pT1c, p(sn)N1a   Special studies-ER positive, [place] positive, HER2 negative, Ki-67 index 70%   Additional Note: Intradepartmental consultation is obtained. Pathology report discussed. She underwent a left breast ultrasound guided biopsy in the 3:00 position on 11/21/22 at [facility]. Left breast, 3:00, core biopsy: Invasive ductal carcinoma, preliminary   grade 3   Ductal carcinoma in situ, high nuclear grade     Breast Cancer Marker Studies:   Estrogen Receptors (ER): Positive          Percentage of cells positive: 90%          Intensity: Strong     Progesterone Receptors (KY): Positive          Percentage of cells positive: 70%          Intensity: Moderate     Her-2/stephane (c-erb B-2) protein expression: Negative (1+)     Ki-67 proliferatin index: 70%       8/26/22 - Bilateral screening mammogram - [place]:      FINDINGS:      Prior study comparison: December 9, 2019, bilateral MM tomosynthesis screening BI performed at      [facility].       December 1, 2017, bilateral MM tomosynthesis screening BI performed at [facility]. There is a  new mass lesion in the left breast seen at the central position. The mass has      spiculated margins. There are scattered fibroglandular densities (Composition Category B,  type 2). IMPRESSION:      BI-RADS 5: Highly suggestive of malignancy - Appropriate action should be taken. Ultrasound with ultrasound biopsy recommended. RECOMMENDATION:      Needle localization and biopsy. This patient has been scheduled for a Ultrasound and Ultrasound      biopsy of the left breast on      09/02/2022. Please see that report for additional recommendation. 9/6/22 -Left breast ultrasound - [place]:      FINDINGS:  There is a 12 mm area of parenchymal distortion left breast four o'clock position with      shadowing suspicious for malignancy. Impression             Ultrasound, left breast, complete:      1.  12 mm area of parenchymal distortion left breast four o'clock position with shadowing      suspicious for malignancy. The patient has been scheduled for biopsy following this procedure. BIRADS-4                Patient underwent a left breast ultrasound guided biopsy in the 4:00 position of the left breast on 9/6/22. Pathology completed at [facility]:   Diagnosis:   OUTSIDE SLIDES FOR REVIEW   Left breast, core needle biopsy (DOS 09/06/2022): Benign breast   parenchyma with focal fibrosis     Comment:     There is no evidence of malignancy in the tissue submitted   for review. The tissue findings do not account for the presence of a   mass. Correlation with clinical and imaging findings is essential.         11/21/22 - Left diagnostic mammogram - [facility]:     FINDINGS:   There are scattered areas of fibroglandular density. Biopsy clip at site    of   irregular mass left lateral breast approximately 3 o'clock position.   The   left breast hematoma has resolved mammographically since the post    ultrasound   biopsy mammogram of September 6, 2022. Left breast ultrasound was performed. The hypoechoic irregular mass 3   o'clock position 5 cm from the nipple previously biopsied with results of   benign breast tissue with focal fibrosis today measures 1.5 x 1.2 x 1.1    cm. Impression   Suspicious mass left breast 3 o'clock position           11/21/22 - Left breast ultrasound - [facility]:     FINDINGS:   There are scattered areas of fibroglandular density. Biopsy clip at site    of   irregular mass left lateral breast approximately 3 o'clock position. The   left breast hematoma has resolved mammographically since the post    ultrasound   biopsy mammogram of September 6, 2022. Left breast ultrasound was performed. The hypoechoic irregular mass 3   o'clock position 5 cm from the nipple previously biopsied with results of   benign breast tissue with focal fibrosis today measures 1.5 x 1.2 x 1.1    cm. Impression   Suspicious mass left breast 3 o'clock position       Recommendation: Repeat left ultrasound-guided biopsy will be performed    today. BIRADS:   BIRADS - CATEGORY 4       Suspicious Abnormality. Biopsy should be considered at this time. OVERALL ASSESSMENT - SUSPICIOUS       Pathologic prognostic stage IIa left lateral breast cancer. Has healed beautifully. Oncotype pending. Scheduled to see Dr. Yessenia Cosme, and we will discuss with her office remainder of metastatic work-up including CT scans of the chest, abdomen, and pelvis and bone scan. Questions answered to patient and son satisfaction. We will be due for imaging again in November 2023 and office visit in 6 months          Plan:        Patient instructed to keep incision clean and dry well after bathing. Patient can start scar massage once incision is completely healed and strong enough to handle the motion (usually 10 - 14 days post operatively).  Rub in a circular motion on and around the scar with firm, even pressure for 5 minutes four times per day. Use lotion or moisturizer to do the scar massage to allow ease with motion over the scar and prevent friction at the area. Continue monthly breast self examination. Bring any changes to your physician's attention. Routine screening imaging in November 2023. Range of motion exercises for left shoulder encouraged. Lymphedema precautions discussed. Education/Lifestyle recommendations: A regular exercise program is encouraged. Avoid excessive caffeine intake. Maintain a diet rich in vegetables and fruits avoiding processed and fast food. Consultations: Oncology appointments as scheduled. Continue regular appointments with PCP   Office follow-up visit should be scheduled in 6. Months and PRN. I spent a total of 30 minutes on the date of the service which included preparing to see the patient, face-to-face patient care, completing clinical documentation, obtaining and/or reviewing separately obtained history, performing a medically appropriate examination, counseling and educating the patient/family/caregiver, ordering medications, tests, or procedures, communicating with other HCPs (not separately reported), independently interpreting results (not separately reported), communicating results to the patient/family/caregiver and care coordination (not separately reported). This document is generated, in part, by voice recognition software and thus syntax and grammatical errors are possible. Kesha López MD [facility]  February 14, 2023

## 2023-02-14 ENCOUNTER — OFFICE VISIT (OUTPATIENT)
Dept: BREAST CENTER | Age: 73
End: 2023-02-14

## 2023-02-14 VITALS
TEMPERATURE: 98.2 F | HEIGHT: 62 IN | BODY MASS INDEX: 31.83 KG/M2 | OXYGEN SATURATION: 92 % | DIASTOLIC BLOOD PRESSURE: 78 MMHG | RESPIRATION RATE: 12 BRPM | HEART RATE: 65 BPM | WEIGHT: 173 LBS | SYSTOLIC BLOOD PRESSURE: 152 MMHG

## 2023-02-14 DIAGNOSIS — C50.912 MALIGNANT NEOPLASM OF LEFT BREAST IN FEMALE, ESTROGEN RECEPTOR POSITIVE, UNSPECIFIED SITE OF BREAST (HCC): Primary | ICD-10-CM

## 2023-02-14 DIAGNOSIS — Z17.0 MALIGNANT NEOPLASM OF LEFT BREAST IN FEMALE, ESTROGEN RECEPTOR POSITIVE, UNSPECIFIED SITE OF BREAST (HCC): Primary | ICD-10-CM

## 2023-02-14 PROCEDURE — 99024 POSTOP FOLLOW-UP VISIT: CPT | Performed by: SURGERY

## 2023-02-17 ENCOUNTER — OFFICE VISIT (OUTPATIENT)
Dept: ONCOLOGY | Age: 73
End: 2023-02-17
Payer: MEDICARE

## 2023-02-17 ENCOUNTER — HOSPITAL ENCOUNTER (OUTPATIENT)
Dept: INFUSION THERAPY | Age: 73
Discharge: HOME | End: 2023-02-17

## 2023-02-17 VITALS
DIASTOLIC BLOOD PRESSURE: 74 MMHG | HEIGHT: 62 IN | SYSTOLIC BLOOD PRESSURE: 180 MMHG | WEIGHT: 172 LBS | OXYGEN SATURATION: 99 % | BODY MASS INDEX: 31.65 KG/M2 | TEMPERATURE: 98.1 F | HEART RATE: 73 BPM

## 2023-02-17 DIAGNOSIS — C18.9 MALIGNANT NEOPLASM OF COLON, UNSPECIFIED PART OF COLON (HCC): Primary | ICD-10-CM

## 2023-02-17 PROCEDURE — 3017F COLORECTAL CA SCREEN DOC REV: CPT | Performed by: INTERNAL MEDICINE

## 2023-02-17 PROCEDURE — 1036F TOBACCO NON-USER: CPT | Performed by: INTERNAL MEDICINE

## 2023-02-17 PROCEDURE — 99213 OFFICE O/P EST LOW 20 MIN: CPT

## 2023-02-17 PROCEDURE — 1123F ACP DISCUSS/DSCN MKR DOCD: CPT | Performed by: INTERNAL MEDICINE

## 2023-02-17 PROCEDURE — 3074F SYST BP LT 130 MM HG: CPT | Performed by: INTERNAL MEDICINE

## 2023-02-17 PROCEDURE — G8417 CALC BMI ABV UP PARAM F/U: HCPCS | Performed by: INTERNAL MEDICINE

## 2023-02-17 PROCEDURE — 3078F DIAST BP <80 MM HG: CPT | Performed by: INTERNAL MEDICINE

## 2023-02-17 PROCEDURE — 1090F PRES/ABSN URINE INCON ASSESS: CPT | Performed by: INTERNAL MEDICINE

## 2023-02-17 PROCEDURE — G8484 FLU IMMUNIZE NO ADMIN: HCPCS | Performed by: INTERNAL MEDICINE

## 2023-02-17 PROCEDURE — 99214 OFFICE O/P EST MOD 30 MIN: CPT | Performed by: INTERNAL MEDICINE

## 2023-02-17 PROCEDURE — G8427 DOCREV CUR MEDS BY ELIG CLIN: HCPCS | Performed by: INTERNAL MEDICINE

## 2023-02-17 PROCEDURE — G8399 PT W/DXA RESULTS DOCUMENT: HCPCS | Performed by: INTERNAL MEDICINE

## 2023-02-17 NOTE — PROGRESS NOTES
709  [facility] MED ONCOLOGY  [address] [place] [id number]  Dept: 71 Yessi Torres: [phone]  Attending Progress Note      Reason for Visit:   1. Recurrent VTE. 2. Factor V Leiden Homozygosity. 3. Colon cancer. Referring Physician:  Lorenza Ferrell NP    PCP:  Moni Schofield MD    History of Present Illness: The patient is a pleasant 77-year-old lady with a PMH significant for obesity, HTN, and hyperlipidemia, who was diagnosed with PE and bilateral DVTs that were provoked following ventral hernia repair in March 2017, she had her surgery done in [facility], she was anticoagulated with Coumadin, she was complaining of left knee pain, had a venous US done on 3/1/2019, revealing an acute DVT in the left posterior tibial vein, chronic appearing thromus from proximal femoral to peroneal veins. She was started on Lovenox 100 mg subq bid, INR was therapeutic, 2.2. She does not like the injections, prefers to be on an oral anticoagulant, AC was changed to Eliquis. FH is negative for VTE. The patient was last seen in the office in November 2020, the patient was diagnosed with colon cancer, she underwent a right hemicolectomy on 11/28/2020 by Dr. Anastacia Coffman at [facility], was diagnosed with stage I colon cancer,  Afterwards the patient was peritoneal and was reexplored with findings of missed enterotomy which was repaired. After this second surgery experienced a cardiopulmonary arrest, ROSC was achieved and she was intubated. Patient was transferred from [facility] to the [facility]. On 11/27 she underwent exploratory laparotomy with small bowel resection plus loop ileostomy plus distal mucous fistula and excision of mesh. Afterwards patient developed an abdominal abscess that required IR drainage on 12/3. Patient underwent a tracheostomy on 12/8.      The patient was found to have an area of parenchymal distortion in the left breast, which was consistent with mild fibrocystic changes,had a repeat left breast biopsy done on 11/21/2022, pathology was consistent with invasive ductal carcinoma, grade 3, with associated DCIS, ER +90%, WV +70%, HER2/stephane negative, 1+ by IHC. Ki-67 is 70%. The patient underwent on 2/1/2023 left localized breast lumpectomy with left axillary sentinel lymph node excisional biopsy, pathology:   CANCER CASE SUMMARY   Procedure -excision   Specimen Laterality -left   Tumor Site, Invasive Carcinoma-lower outer quadrant   Histologic Type -invasive carcinoma, no special type (ductal)   Histologic Grade (Nemo Histologic Score):                    Tubule differentiation- score-3                    Nuclear pleomorphism- score 3                    Mitotic rate- score 2                    Overall Grade- grade 3, (score 8)   Tumor Size: Size of Largest Invasive Carcinoma-approximately 16 mm   Ductal Carcinoma In Situ (DCIS)-present, negative for extensive   intraductal component   Lymphovascular Invasion-not identified   Treatment Effect in the Breast-no known presurgical therapy   Margin status for invasive carcinoma: All margins negative for invasive   carcinoma   Distance from invasive carcinoma to closest margin-2 mm/inferior   Margin Status for DCIS-all margins negative for DCIS   Distance from DCIS to closest margin (if applicable)-5 mm/medial   Regional Lymph Nodes: Tumor present in regional lymph node (sentinel)   Number of lymph nodes with macrometastases-1   Size of largest ariadna metastatic deposit 3 mm   Extranodal extension-not identified   Total number of lymph nodes examined-1 (sentinel)   Pathologic Stage Classification (AJCC 8th Edition)- pT1c, p(sn)N1a   Special studies-ER positive, WV positive, HER2 negative, Ki-67 index 70%   Additional Note: Intradepartmental consultation is obtained. The patient had recovered well from the surgery.   No bleeding. Review of Systems;  CONSTITUTIONAL: No fever, chills. Good appetite, feeling tired. Pos for weight los. ENMT: Eyes: No diplopia; Nose: Pos for epistaxis. Mouth: No sore throat. RESPIRATORY: No hemoptysis, shortness of breath, cough. CARDIOVASCULAR: No chest pain, palpitations. GASTROINTESTINAL: No nausea/vomiting, abdominal pain, diarrhea/constipation. GENITOURINARY: No dysuria, urinary frequency, hematuria. Pos dizziness. NEURO: No syncope, presyncope, headache.   Remainder:  ROS NEGATIVE    Past Medical History:      Diagnosis Date    Colon cancer ([facility] 11/01/2020    Hyperlipidemia     LBBB (left bundle branch block)     Obesity      Patient Active Problem List   Diagnosis    Mixed hyperlipidemia    Essential hypertension    LBBB (left bundle branch block)    Chronic deep vein thrombosis (DVT) of lower extremity ([facility])    Personal history of pulmonary embolism    Type 2 diabetes mellitus with complication, without long-term current use of insulin ([facility])    Chronic diastolic (congestive) heart failure ([facility])    Vitamin D deficiency    Venous insufficiency    Age-related osteoporosis without current pathological fracture    Other specified hypothyroidism    Primary generalized (osteo)arthritis    Chronic pain syndrome    Morbid obesity with BMI of 45.0-49.9, adult ([facility])    Seasonal allergic rhinitis due to pollen    Moderate persistent asthma with acute exacerbation    Class 3 severe obesity due to excess calories with serious comorbidity and body mass index (BMI) of 45.0 to 49.9 in adult [facility])    Encounter for immunization    Factor V deficiency ([facility]    Iron deficiency anemia due to chronic blood loss    Acute kidney injury ([facility]    CKD (chronic kidney disease) stage 3, GFR 30-59 ml/min ([facility])    Hyponatremia    Hyperkalemia    Hypotension    Prolonged Q-T interval on ECG    Sepsis (HCC)    Severe protein-calorie malnutrition (HCC)    Malignant neoplasm of left breast [facility])        Past Surgical History:      Procedure Laterality Date    APPENDECTOMY      BREAST BIOPSY Left 2/1/2023    left blue dye injection, left localized breast lumpectomy, left axillary sentinel lymph node excision, performed by Cecilia Jimenez MD at [address] [place] [address] 11/2020    COLOSTOMY  01/01/2007    due to divertiulosis since than it was reversed    HERNIA REPAIR      HYSTERECTOMY, TOTAL ABDOMINAL (CERVIX REMOVED)  01/01/1990    US BREAST BIOPSY W LOC DEVICE 1ST LESION LEFT Left 09/06/2022    Benign    US BREAST BIOPSY W LOC DEVICE 1ST LESION LEFT Left 11/21/2022    US BREAST NEEDLE BIOPSY LEFT 11/21/2022 [facility]    US GUIDED NEEDLE LOC OF LEFT BREAST Left 2/1/2023    US GUIDED NEEDLE LOC OF LEFT BREAST 2/1/2023 [facility]       Family History:  Family History   Problem Relation Age of Onset    Cancer Mother 62        lung and brain cancer    Diabetes Father     Kidney Disease Father     Cancer Sister 25        leukemia    Cancer Paternal Grandmother 80        colon cancer    Cancer Son 43        kidney cancer       Medications:  Reviewed and reconciled.     Social History:  Social History     Socioeconomic History    Marital status:      Spouse name: Not on file    Number of children: Not on file    Years of education: Not on file    Highest education level: Not on file   Occupational History    Not on file   Tobacco Use    Smoking status: Former     Packs/day: 1.00     Years: 40.00     Pack years: 40.00     Types: Cigarettes     Quit date: 1/1/2008     Years since quitting: 15.1    Smokeless tobacco: Never   Vaping Use    Vaping Use: Never used   Substance and Sexual Activity    Alcohol use: Yes     Comment: rarely    Drug use: No    Sexual activity: Not Currently   Other Topics Concern    Not on file   Social History Narrative    Not on file     Social Determinants of Health     Financial Resource Strain: Not on file   Food Insecurity: Not on file   Transportation Needs: Not on file Physical Activity: Not on file   Stress: Not on file   Social Connections: Not on file   Intimate Partner Violence: Not on file   Housing Stability: Not on file       Allergies: Allergies   Allergen Reactions    Amlodipine      Hives      Cefdinir     E-Mycin [Erythromycin]      Made her dizzy and she didn't feel well    Lisinopril     Seasonal        Physical Exam:  BP (!) 180/74   Pulse 73   Temp 98.1 °F (36.7 °C)   Ht 5' 2\" (1.575 m)   Wt 172 lb (78 kg)   SpO2 99%   BMI 31.46 kg/m²     GENERAL: Alert, oriented x 3, not in acute distress. HEENT: PERRLA; EOMI. Oropharynx clear. NECK: Supple. No palpable cervical or supraclavicular lymphadenopathy. LUNGS: Good air entry bilaterally. No wheezing, crackles or rhonchi. CARDIOVASCULAR: Regular rate. No murmurs, rubs or gallops. BREASTS: The left breast exam is remarkable for an incision from her lumpectomy, it is healing nicely, no palpable masses, no nipple discharge, no palpable left axillary lymphadenopathy, the right breast exam is negative for skin changes, no nipple discharge, no palpable masses, no palpable right axillary lymphadenopathy. ABDOMEN: Soft, nontender. EXTREMITIES: mildt bilateral LLE, she is wearing compression stockings, no tenderness/erythema or warmth of the calves. NEUROLOGIC: No focal deficits. Impression/Plan:     The patient is a pleasant 24-year-old lady with a PMH significant for obesity, HTN, and hyperlipidemia, who was diagnosed with PE and bilateral DVTs that were provoked following ventral hernia repair in March 2017, she had her surgery done in [facility], she was anticoagulated with Coumadin, she was complaining of left knee pain, had a venous US done on 3/1/2019, revealing an acute DVT in the left posterior tibial vein, chronic appearing thromus from proximal femoral to peroneal veins. She was started on Lovenox 100 mg subq bid, INR was therapeutic, 2.2.  She does not like the injections, prefers to be on an oral anticoagulant. Lovenox was d/sade and she was started on Eliquis. FH is negative for VTE. Patient with history of provoked PE and bilateral DVTs in 2017, she has recurrent VTE, ordered testing for inherited thrombophilia that is reliable in the setting of acute thrombosis. She is homozygous for factor V Leiden mutation, this was discussed with the patient and her son, ACLA are neg, anti B2GPI Antibodies are neg, PT gene mutation is negative. We discussed that obesity and decreased mobility increase the risk of VTE, I encouraged her to continue with weight loss. She had a f/up LLE venous US done revealing improvement of the DVT, she has a persistent occlusive thrombus in the proximal left superficial femoral vein and nonocclusive thrombus in the mid to distal superficial femoral vein, it is hard to tell for how long she had had those clots, continue  Eliquis, I do recommend lifelong anticoagulation unless if she has a contraindication to [facility] in the future. The patient needs financial assistance on Eliquis, referral was placed to [employer]. Will be provided with samples today. The patient was last seen in the office in November 2020, the patient was diagnosed with colon cancer, CEA was 0.6, there was no evidence of metastatic disease she underwent a right hemicolectomy on 11/28/2020 by Dr. Jeramy Wang at [facility], was diagnosed with stage I colon cancer,  Afterwards the patient was peritoneal and was reexplored with findings of missed enterotomy which was repaired. After this second surgery experienced a cardiopulmonary arrest, ROSC was achieved and she was intubated. Patient was transferred from [facility] to the [facility]. On 11/27 she underwent exploratory laparotomy with small bowel resection plus loop ileostomy plus distal mucous fistula and excision of mesh. Afterwards patient developed an abdominal abscess that required IR drainage. Epistaxis had improved.   Discussed possibly decreasing the dose of Eliquis 2.5 mg p.o. twice daily if she continues to have epistaxis. her hemoglobin/hematocrit are normal.  Eliquis to be held 5 days prior to the breast surgery. The patient was found to have an area of parenchymal distortion in the left breast, which was consistent with mild fibrocystic changes, the patient was seen by Dr. Cami Singh, the patient had a repeat left breast biopsy done on 11/21/2022, pathology was consistent with invasive ductal carcinoma, grade 3, with associated DCIS, ER +90%, MI +70%, HER2/stephane negative, 1+ by IHC. Ki-67 is 70%. The patient underwent on 2/1/2023 left localized breast lumpectomy with left axillary sentinel lymph node excisional biopsy, pathology was consistent with invasive carcinoma, ductal, grade 3, with associated DCIS, negative for extensive intraductal component, no lymphovascular invasion, margins are negative, 1 sentinel lymph node was removed, was positive for macrometastasis without extranodal extension, final pathologic stage pT1c, p(sn)N1aMx. I reviewed with the patient her pathology results, diagnosis, prognosis, and recommendations for treatment. Staging CT scans of the chest, abdomen, pelvis and a bone scan were ordered. The patient had an Oncotype DX done to determine whether or not adjuvant chemotherapy is beneficial, await results. Adjuvant chemotherapy with an AI is recommended, she would also be a candidate for Verzenio. RTC in 2 weeks. Thank you for allowing us to participate in the care of Mrs. Banuelos.     Brandon Isabel MD   HEMATOLOGY/MEDICAL [facility]  [address] [facility] MED ONCOLOGY  [address] [place] [id number]  Dept: 71 Yessi Torres: [phone]

## 2023-02-23 ENCOUNTER — TELEPHONE (OUTPATIENT)
Dept: CASE MANAGEMENT | Age: 73
End: 2023-02-23

## 2023-02-23 ENCOUNTER — HOSPITAL ENCOUNTER (OUTPATIENT)
Dept: RADIATION ONCOLOGY | Age: 73
Discharge: HOME | End: 2023-02-23
Payer: MEDICARE

## 2023-02-23 VITALS
DIASTOLIC BLOOD PRESSURE: 71 MMHG | WEIGHT: 174.6 LBS | TEMPERATURE: 97.7 F | SYSTOLIC BLOOD PRESSURE: 153 MMHG | RESPIRATION RATE: 18 BRPM | BODY MASS INDEX: 31.93 KG/M2 | HEART RATE: 70 BPM

## 2023-02-23 DIAGNOSIS — Z17.0 MALIGNANT NEOPLASM OF LOWER-OUTER QUADRANT OF LEFT BREAST OF FEMALE, ESTROGEN RECEPTOR POSITIVE (HCC): Primary | ICD-10-CM

## 2023-02-23 DIAGNOSIS — C50.512 MALIGNANT NEOPLASM OF LOWER-OUTER QUADRANT OF LEFT BREAST OF FEMALE, ESTROGEN RECEPTOR POSITIVE (HCC): Primary | ICD-10-CM

## 2023-02-23 PROCEDURE — 99205 OFFICE O/P NEW HI 60 MIN: CPT | Performed by: RADIOLOGY

## 2023-02-23 PROCEDURE — 99205 OFFICE O/P NEW HI 60 MIN: CPT

## 2023-02-23 NOTE — TELEPHONE ENCOUNTER
Met with patient during her initial consultation with Dr. Pamela Harris for radiation therapy for her breast cancer diagnosis. Introduced myself and explained my role with cancer patients receiving treatment within our cancer centers. Patient was friendly and receptive. States that all of her questions and concerns were fully addressed during her consultation appointment with the radiation therapy nurse and physician. Denies any problems with insurance coverage for medication or transportation for the daily treatments. Reviewed additional ancillary services available at the center. Denies any referral needs at this time. Patient follows with Dr. Darwin Witt at [facility] ([facility]). Her next appointment with her is on 3/3. The patient was ordered an Oncotype DX and her RS was 28. Patient provided with written literature of Patient Resource Booklet: Breast Cancer, ACS Booklet: Radiation Therapy: What It Is, How It Helps, ACS: Radiation Therapy for Breast Cancer, and Breast Prehab sheet. Provided patient with my contact information, office hours, and encouragement to call me with questions or concerns. Patient verbalizes understanding and appreciative of nurse navigator visit. Emotional support provided and greater than 25 minutes spent with patient. Nurse navigator will continue to follow.  GIULIA Jackson, RN, Oncology Nurse Navigator

## 2023-02-23 NOTE — PROGRESS NOTES
Roxanne Gertrude  1950 67 y.o. Referring Physician: Fabi    PCP: Jeannine Wyatt MD     Vitals:    23 1111   Pulse: 70   Resp: 18   Temp: 97.7 °F (36.5 °C)        Wt Readings from Last 3 Encounters:   23 172 lb (78 kg)   23 173 lb (78.5 kg)   23 165 lb (74.8 kg)        There is no height or weight on file to calculate BMI. Chief Complaint: No chief complaint on file. Cancer Staging  Malignant neoplasm of left breast [facility])  Staging form: Breast, AJCC 8th Edition  - Pathologic stage from 2/15/2023: Stage IB (pT1c, pN1(sn), cM0, G3, ER+, AK+, HER2-) - Signed by Adriana Rebollar MD on 2/15/2023      Prior Radiation Therapy? NO    Concurrent Chemo/radiation? NO    Prior Chemotherapy? NO    Prior Hormonal Therapy? Yes, long time ago not sure how many years, was using Premarin    Head and Neck Cancer? No, patient does NOT have HN cancer.       LMP: 36    Age at first Menses: 15    : 3, first live birth at age 21    Para: 2        Current Outpatient Medications   Medication Sig Dispense Refill    allopurinol (ZYLOPRIM) 100 MG tablet Take by mouth      hydrOXYzine HCl (ATARAX) 10 MG tablet Take by mouth      levocetirizine (XYZAL) 5 MG tablet Take by mouth      Magnesium 400 MG TABS Take 400 mg by mouth daily      sodium chloride 1 g tablet Take 1 g by mouth 3 times daily      NONFORMULARY in the morning, at noon, and at bedtime collogen and vitamin c      Multiple Vitamins-Minerals (THERAPEUTIC MULTIVITAMIN-MINERALS) tablet Take 1 tablet by mouth daily      metoprolol succinate (TOPROL XL) 25 MG extended release tablet Take 0.5 tablets by mouth daily (Patient taking differently: Take 12.5 mg by mouth at bedtime) 90 tablet 3    ferrous sulfate (IRON 325) 325 (65 Fe) MG tablet ferrous sulfate Ferrous Sulfate Active 325 MG Oral Daily After A Meal  10:46am 10-  [facility] ([id number])      apixaban (ELIQUIS) 5 MG TABS tablet Take 1 tablet by mouth 2 times daily 84 tablet 0    lovastatin (MEVACOR) 40 MG tablet Take 1 tablet by mouth nightly 90 tablet 1    PROAIR  (90 Base) MCG/ACT inhaler Inhale 1 puff into the lungs 4 times daily 3 Inhaler 1    Cholecalciferol (VITAMIN D3) 25 MCG (1000 UT) CAPS Take by mouth daily      Calcium Carbonate (CALTRATE 600 PO) Take by mouth 2 times daily        Current Facility-Administered Medications   Medication Dose Route Frequency Provider Last Rate Last Admin    perflutren lipid microspheres (DEFINITY) injection 1.65 mg  1.5 mL IntraVENous ONCE PRN Rudi Mohamud MD           Past Medical History:   Diagnosis Date    Colon cancer ([facility] [address] 11/01/2020    Hyperlipidemia     Hypertension     LBBB (left bundle branch block)     Obesity        Past Surgical History:   Procedure Laterality Date    APPENDECTOMY      BREAST BIOPSY Left 2/1/2023    left blue dye injection, left localized breast lumpectomy, left axillary sentinel lymph node excision, performed by Rosa Maria Chan MD at [facility] N/A 11/2020    COLOSTOMY  01/01/2007    due to divertiulosis since than it was reversed    HERNIA REPAIR      HYSTERECTOMY, TOTAL ABDOMINAL (CERVIX REMOVED)  01/01/1990    US BREAST BIOPSY W LOC DEVICE 1ST LESION LEFT Left 09/06/2022    Benign    US BREAST BIOPSY W LOC DEVICE 1ST LESION LEFT Left 11/21/2022    US BREAST NEEDLE BIOPSY LEFT 11/21/2022 [facility]    US GUIDED NEEDLE LOC OF LEFT BREAST Left 2/1/2023    US GUIDED NEEDLE LOC OF LEFT BREAST 2/1/2023 [facility]       Family History   Problem Relation Age of Onset    Cancer Mother 62        lung and brain cancer    Diabetes Father     Kidney Disease Father     Cancer Sister 25        leukemia    Cancer Paternal Grandmother 80        colon cancer    Cancer Son 43        kidney cancer       Social History     Socioeconomic History    Marital status:      Spouse name: Not on file    Number of children: Not on file Years of education: Not on file    Highest education level: Not on file   Occupational History    Not on file   Tobacco Use    Smoking status: Former     Packs/day: 1.00     Years: 40.00     Pack years: 40.00     Types: Cigarettes     Quit date: 1/1/2008     Years since quitting: 15.1    Smokeless tobacco: Never   Vaping Use    Vaping Use: Never used   Substance and Sexual Activity    Alcohol use: Yes     Comment: rarely    Drug use: No    Sexual activity: Not Currently   Other Topics Concern    Not on file   Social History Narrative    Not on file     Social Determinants of Health     Financial Resource Strain: Not on file   Food Insecurity: Not on file   Transportation Needs: Not on file   Physical Activity: Not on file   Stress: Not on file   Social Connections: Not on file   Intimate Partner Violence: Not on file   Housing Stability: Not on file           Occupation: automotive  worker  Retired:  Yes        [address]: <<For Level 5, 10 or more systems>> Roderick Waldron is a pleasant 67years old female, she is here today with her daughter Shweta Pressley to discuss receiving radiation therapy R/T newly diagnosed Left breast cancer. She is alert and oriented x 4, denies pain while sitting down but she does have knee pain with activity. She uses a cane with ambulation, and she has a colostomy. Patient is know to Dr Lester Arora oncology, saw patient in November 2020 when patient was diagnosed with colon cancer. 8/26/2022 She underwent Bilateral screening mammogram in [place] which showed:  New mass lesion in the left breast seen at the central position. 9/6/22 She then underwent ultrasound guided biopsy in the 4 O'clock position left breast in [place], pathology revealed:  Left breast 1.1 cm mass biopsy, mild fibrocystic changes. Multiple sections reveal no evidence of  atypical hyperplasia and/or malignancy.   The patient was seen by Dr Rizwana Urbina, patient had a repeat Left breast biopsy done 11/21/22 , pathology revealed: Left breast core biopsy:  Invasive Ductal carcinoma, preliminary Grade 3; Ductal carcinoma In situ, high nuclear grade; ER+(90%) AL+(70%)HER-2 negative Ki-67 proliferative index: 70%. 2/01/23 She underwent Left breast outer quadrant excision:  Invasive ductal carcinoma; Grade=3; DCIS:  Present;  negative for extensive intraductal component. All margins negative for invasive carcinoma; closest margin 2 mm/inferior;  margin for DCIS negative, closest margin 5 mm/medial; Number of lymph nodes with macro-metastases=1; size of largest ariadna metastatic deposit=3 mm; Pathologic stage:  pT1c p(sn)N1a, Oncotype = 28. Per Dr Dunlap's notes patient needs staging scans  (not scheduled yet). Recommendations:  Referral to Rad/Onc; Adjuvant Chemotherapy depending on Oncotype results, then with AI, patient is also a candidate for Verzenio. Approximately spent > 20 minutes with patient and daughter, answered all questions from nursing perspective, they both verbalizes understanding. Pacemaker/Defibulator/ICD:  No    Mediport: No        FALLS RISK SCREENING ASSESSMENT    Instructions:  Assess the patient and enter the appropriate indicators that are present for fall risk identification. Total the numbers entered and assign a fall risk score from Table 2.  Reassess patient at a minimum every 12 weeks or with status change. Assessment   Date  2/23/2023     1. Mental Ability: confusion/cognitively impaired No - 0       2. Elimination Issues: incontinence, frequency No - 0       3. Ambulatory: use of assistive devices (walker, cane, off-loading devices), attached to equipment (IV pole, oxygen) Yes - 2/cane     4. Sensory Limitations: dizziness, vertigo, impaired vision No - 0       5. Age 72 years or greater - 1       10. Medication: diuretics, strong analgesics, hypnotics, sedatives, antihypertensive agents   Yes - 3   7.   Falls:  recent history of falls within the last 3 months (not to include slipping or tripping) No - 0   TOTAL 6    If score of 4 or greater was education given? Yes       TABLE 2   Risk Score Risk Level Plan of Care   0-3 Little or  No Risk 1. Provide assistance as indicated for ambulation activities  2. Reorient confused/cognitively impaired patient  3. Call-light/bell within patient's reach  4. Chair/bed in low position, stretcher/bed with siderails up except when performing patient care activities  5. Educate patient/family/caregiver on falls prevention  6.  Reassess in 12 weeks or with any noted change in patient condition which places them at a risk for a fall   4-6 Moderate Risk 1. Provide assistance as indicated for ambulation activities  2. Reorient confused/cognitively impaired patient  3. Call-light/bell within patient's reach  4. Chair/bed in low position, stretcher/bed with siderails up except when performing patient care activities  5. Educate patient/family/caregiver on falls prevention  6. Falls risk precaution (Yellow sticker Level II) placed on patient chart   7 or   Higher High Risk 1. Place patient in easily observable treatment room  2. Patient attended at all times by family member or staff  3. Provide assistance as indicated for ambulation activities  4. Reorient confused/cognitively impaired patient  5. Call-light/bell within patient's reach  6. Chair/bed in low position, stretcher/bed with siderails up except when performing patient care activities  7. Educate patient/family/caregiver on falls prevention  8. Falls risk precaution (Yellow sticker Level III) placed on patient chart           MALNUTRITION RISK SCREENING ASSESSMENT    Instructions:  Assess the patient and enter the appropriate indicators that are present for nutrition risk identification. Total the numbers entered and assign a risk score. Follow the appropriate action for total score listed below. Assessment   Date  2/23/2023     Have you lost weight without trying?       2- Unsure/ lost about 50 lbs for the last 2 years since her colon cancer diagnosis     Have you been eating poorly because of a decreased appetite? 0- No   3. Do you have a diagnosis of head and neck cancer? 0- No                                                                                    TOTAL 2          Score of 0-1: No action  Score 2 or greater: For Non-Diabetic Patient: Recommend adding Ensure Complete 2 x daily and provide patient with Ensure wellness bag with coupons  For Diabetic Patient: Recommend adding Glucerna Shake 2 x daily and provide patient with Glucerna Wellness bag with coupons  Route to the dietitian via [employer] [address]    Are you having difficulty performing daily routine tasks due to fatigue or weakness (ie: bathing/showering, dressing, housework, meal prep, work, , etc): No     Do you have any arm flexibility/ROM restrictions, swelling or pain that limit activity: Yes     Any changes in memory, attention/focus that impact daily activities: No     Do you avoid participation in leisure/social activity due to weakness, fatigue or pain: No     ARE ANY OF THE ABOVE ARE ANSWERED YES: Yes - but NO OT referral request sent due to not at this time. PT ASSESSMENT FOR REFERRAL    Have you had any recent falls in the past 2 months: No     Do you have difficulty going up/down stairs: Yes     Are you having difficulty walking: Yes , knee pain with activity, using a cane    Do you often hold onto furniture/environmental supports or feel off balance when you are walking: Yes     Do you need to take rest breaks when you are walking: Yes     Any pain on a scale of 1-10 that limits your mobility: Yes 0/10    ARE ANY OF THE ABOVE ARE ANSWERED YES: Yes - but NO PT referral request sent due to not at this time.            LYMPHEDEMA SCREENING ASSESSMENT FOR PATIENTS WITH BREAST CANCER    The patient reports the following signs/symptoms of lymphedema: None    Please ask the provider to assess patient for lymphedema for any reported signs or symptoms so a referral to Lymphedema Therapy can be considered. PELVIC FLOOR DYSFUNCTION SCREENING ASSESSMENT    - Do you have any pelvic pain? No     - Do you have any fecal constipation or fecal incontinence? No     - Do you have any urinary incontinence or difficulty starting to urinate? No     ARE ANY OF THE ABOVE ARE ANSWERED YES: No          PREHAB AUDIOLOGY REFERRAL    - Is patient planned to receive Cisplatin? No. This patient is not planned to start Cisplatin. - Is patient planned to receive radiation therapy that may be directed toward auditory canals or nerves? No. Patient is not planned to start radiation therapy to auditory canals or nerves. - Is patient complaining of new onset hearing loss? No. Patient is not complaining of new onset hearing loss. Patient education given on radiation therapy side effects and fall prevention safety utilizing slides and handouts. The patient expresses understanding and acceptance of instructions.  Louisa Herndon RN 2/23/2023 11:15 AM           Louisa Herndon RN

## 2023-02-23 NOTE — PROGRESS NOTES
Radiation Oncology Consult Note         2/23/2023    Swati Banuelos  67 y.o.   1950    REFERRING PROVIDER: Anant Poe    PCP:  Kulwinder Campos MD    CHIEF COMPLAINT:   I have a recent diagnosis of breast cancer for which I underwent surgery, I am here to find out if I need radiation therapy. DIAGNOSIS: Pathological stage pT1c, PN 1 (SN, cM0, G3, ER/OK positive, HER2 negative, invasive ductal carcinoma of the lower outer quadrant of the left breast, SP partial mastectomy and sentinel node biopsy. STAGING: Cancer Staging  Malignant neoplasm of left breast [facility])  Staging form: Breast, AJCC 8th Edition  - Pathologic stage from 2/15/2023: Stage IB (pT1c, pN1(sn), cM0, G3, ER+, OK+, HER2-) - Signed by Manolo Garvey MD on 2/15/2023      HISTORY OF PRESENT ILLNESS: Ms. Glenys Matamoros  is a 67y.o. year old female with many comorbidities and past medical history positive for a stage I colon cancer for which she underwent surgery and had multiple postsurgical complications, among them DVT, PE, abdominal abscess. That was back in 2020, she underwent small bowel resection, loop ileostomy, and distal mucous fistula and excision of mesh also. She also underwent a tracheostomy on 12/8/2020. Recently the patient was found to have an area of parenchymal distortion in the left breast, which was consistent with mild fibrocystic changes, the patient was seen by Dr. Anant Poe, the patient had a repeat left breast biopsy done on 11/21/2022, pathology was consistent with invasive ductal carcinoma, grade 3, with associated DCIS, ER +90%, OK +70%, HER2/stephane negative, 1+ by IHC. Ki-67 = 70%.         The patient underwent on 2/1/2023 left localized breast lumpectomy with left axillary sentinel lymph node excisional biopsy, pathology was consistent with invasive carcinoma, ductal, grade 3, with associated DCIS, negative for extensive intraductal component, no lymphovascular invasion, margins are negative, 1 sentinel lymph node was removed, was positive for macrometastasis without extranodal extension, final pathologic stage pT1c, p(sn)N1aMx. The patient was referred to Dr. Mirtha Clayton, who ordered staging CT scans of the chest, abdomen, pelvis and a bone scan that are still pending. The patient had an Oncotype DX done that resulted in a score of 28 with a distant recurrence risk at 9 years of 22%. Absolute chemotherapy benefit could not be excluded. The patient therefore will rediscussed with Dr. Almond Cowden chemotherapy with an AI , she would also be a candidate for Verzenio, according to Dr. Mirtha Clayton consult note. The patient was referred to me by Dr. Leann Tan for an initial discussion and evaluation of adjuvant radiation therapy. Today the patient is feeling well, she is a little bit tired, she is also anxious about the chemotherapy, since she is not very fond of that, but she stated that if it is beneficial for she is willing to consider it.     PAST MEDICAL HISTORY:      Diagnosis Date    Colon cancer ([facility] [address] 11/01/2020    Hyperlipidemia     Hypertension     LBBB (left bundle branch block)     Obesity        PAST SURGICAL HISTORY:      Procedure Laterality Date    APPENDECTOMY      BREAST BIOPSY Left 2/1/2023    left blue dye injection, left localized breast lumpectomy, left axillary sentinel lymph node excision, performed by Alba Barbosa MD at [address] [place] [address] 11/2020    COLOSTOMY  01/01/2007    due to divertiulosis since than it was reversed    HERNIA REPAIR      HYSTERECTOMY, TOTAL ABDOMINAL (CERVIX REMOVED)  01/01/1990    US BREAST BIOPSY W LOC DEVICE 1ST LESION LEFT Left 09/06/2022    Benign    US BREAST BIOPSY W LOC DEVICE 1ST LESION LEFT Left 11/21/2022    US BREAST NEEDLE BIOPSY LEFT 11/21/2022 [facility]    US GUIDED NEEDLE LOC OF LEFT BREAST Left 2/1/2023    US GUIDED NEEDLE LOC OF LEFT BREAST 2/1/2023 [facility]       Allergies   Allergen Reactions    Amlodipine      Hives Cefdinir     E-Mycin [Erythromycin]      Made her dizzy and she didn't feel well    Lisinopril     Seasonal        MEDICATIONS:  Medications reviewed and reconciled.   Current Outpatient Medications   Medication Sig Dispense Refill    allopurinol (ZYLOPRIM) 100 MG tablet Take by mouth      hydrOXYzine HCl (ATARAX) 10 MG tablet Take by mouth      levocetirizine (XYZAL) 5 MG tablet Take by mouth      Magnesium 400 MG TABS Take 400 mg by mouth daily      sodium chloride 1 g tablet Take 1 g by mouth 3 times daily      NONFORMULARY in the morning, at noon, and at bedtime collogen and vitamin c      Multiple Vitamins-Minerals (THERAPEUTIC MULTIVITAMIN-MINERALS) tablet Take 1 tablet by mouth daily      metoprolol succinate (TOPROL XL) 25 MG extended release tablet Take 0.5 tablets by mouth daily (Patient taking differently: Take 12.5 mg by mouth at bedtime) 90 tablet 3    ferrous sulfate (IRON 325) 325 (65 Fe) MG tablet ferrous sulfate Ferrous Sulfate Active 325 MG Oral Daily After A Meal 100 October 23rd, 2020 10:46am 10-  [facility] ([id number])      apixaban (ELIQUIS) 5 MG TABS tablet Take 1 tablet by mouth 2 times daily 84 tablet 0    lovastatin (MEVACOR) 40 MG tablet Take 1 tablet by mouth nightly 90 tablet 1    PROAIR  (90 Base) MCG/ACT inhaler Inhale 1 puff into the lungs 4 times daily 3 Inhaler 1    Cholecalciferol (VITAMIN D3) 25 MCG (1000 UT) CAPS Take by mouth daily      Calcium Carbonate (CALTRATE 600 PO) Take by mouth 2 times daily        Current Facility-Administered Medications   Medication Dose Route Frequency Provider Last Rate Last Admin    perflutren lipid microspheres (DEFINITY) injection 1.65 mg  1.5 mL IntraVENous ONCE PRN Rudi Mohamud MD           FAMILY HISTORY:  Family History   Problem Relation Age of Onset    Cancer Mother 62        lung and brain cancer    Diabetes Father     Kidney Disease Father     Cancer Sister 25        leukemia    Cancer Paternal Grandmother 80        colon cancer    Cancer Son 43        kidney cancer       SOCIAL HISTORY:       reports that she quit smoking about 15 years ago. Her smoking use included cigarettes. She has a 40.00 pack-year smoking history. She has never used smokeless tobacco..   reports current alcohol use. .   reports no history of drug use. REVIEW OF SYSTEMS:  Obtained from the patient, chart review and nursing assessment. General ROS: positive for  - fatigue  Please refer to the HPI, the review of systems is otherwise negative. PHYSICAL EXAMINATION:      Vitals:    02/23/23 1111   BP: (!) 153/71   Pulse: 70   Resp: 18   Temp: 97.7 °F (36.5 °C)   TempSrc: Tympanic   Weight: 174 lb 9.6 oz (79.2 kg)       Wt Readings from Last 3 Encounters:   02/23/23 174 lb 9.6 oz (79.2 kg)   02/17/23 172 lb (78 kg)   02/14/23 173 lb (78.5 kg)       KARNOSKY PERFORMANCE STATUS: 80    Constitutional: A well developed, well nourished 67 y.o. female who is alert, oriented, cooperative and in no apparent distress. EENT: EOMI JOSE. No icterus. No conjunctival injections. External canals are patent and no discharge was appreciated. .    Neck: Supple without thyromegaly  Respiratory: Chest was symmetrical without dullness to percussion. Breath sounds bilaterally were clear to auscultation. No wheezes, rhonchi or rales. Breasts: Nicely healing partial mastectomy and sentinel node biopsy scar  Cardiovascular: Regular without murmur. Abdomen: Obese, rounded and soft without organomegaly. No rebound, guarding or  rigidity. Lymphatic: No lymphadenopathy. Musculoskeletal: Ambulates without assistance. Normal curvature of the spine. No gross muscle weakness. Muscle size, tone and strength are normal. No involuntary movements. Extremities:  No lower extremity edema, ulcerations, tenderness, varicosities or erythema. Coordination appears adequate. Sensory function appears intact. Skin:  Warm and dry.   Examination of color, turgor and pigmentation was relatively normal. No bruises or skin rashes. Neurological/Psychiatric: General behavior, level of consciousness, thought content is normal. The patient's emotional status is normal.  Cranial nerves II-XII are grossly intact. RADIATION SAFETY AND ONCOLOGIC TREATMENT SUPPORT:    - Previous radiation history:  No  - History of autoimmune or connective tissue disease:  No  - Nutritional support/ PEG:  Not applicable  - Dental evaluation:  Not applicable  -  requested:  Not asked for.  - Oncology Nurse navigator requested:  - Transportation for daily treatment:  Self    TUMOR MARKERS:   Lab Results   Component Value Date/Time    CEA 0.6 10/23/2020 06:12 AM       IMAGING REVIEWED:  No results found. PATHOLOGY REVIEWED:  Diagnosis:   A. Left breast, lower outer quadrant, excision: Invasive carcinoma, no   special type (ductal) and ductal carcinoma in situ, see comment. B. Left sentinel lymph node: Metastatic carcinoma involving single lymph   node. Comment:   CANCER CASE SUMMARY   Procedure -excision   Specimen Laterality -left   Tumor Site, Invasive Carcinoma-lower outer quadrant   Histologic Type -invasive carcinoma, no special type (ductal)   Histologic Grade (Nemo Histologic Score):                    Tubule differentiation- score-3                    Nuclear pleomorphism- score 3                    Mitotic rate- score 2                    Overall Grade- grade 3, (score 8)   Tumor Size: Size of Largest Invasive Carcinoma-approximately 16 mm   Ductal Carcinoma In Situ (DCIS)-present, negative for extensive   intraductal component   Lymphovascular Invasion-not identified   Treatment Effect in the Breast-no known presurgical therapy   Margin status for invasive carcinoma:  All margins negative for invasive   carcinoma   Distance from invasive carcinoma to closest margin-2 mm/inferior   Margin Status for DCIS-all margins negative for DCIS   Distance from DCIS to closest margin (if applicable)-5 mm/medial   Regional Lymph Nodes: Tumor present in regional lymph node (sentinel)   Number of lymph nodes with macrometastases-1   Size of largest ariadna metastatic deposit 3 mm   Extranodal extension-not identified   Total number of lymph nodes examined-1 (sentinel)   Pathologic Stage Classification (AJCC 8th Edition)- pT1c, p(sn)N1a   Special studies-ER positive, RI positive, HER2 negative, Ki-67 index 70%   Additional Note: Intradepartmental consultation is obtained.       IMPRESSION:   The patient presents with an early stage invasive ductal carcinoma of the left breast with 1 sentinel node positive for macrometastasis, metastatic deposit 3 mm.  The maximum dimension of the invasive cancer is a 16 mm and the margins are negative with the closest margin to the invasive carcinoma being 2 mm.  At this point the patient is a candidate for adjuvant irradiation of the residual left breast, the axilla and the supraclavicular area according to the AMAROS trial.    DISCUSSION/PLAN:     I have discussed at length with the patient that after consideration for systemic chemotherapy, that should be completed before the radiation therapy, she would be candidate for treatment of the residual breast and the ipsilateral regional nodes according to the [place] AMAROS trial so that shows the same a local control with radiation therapy compared to axillary dissection, with less side effects in terms of arm lymphedema in favor of the radiation therapy treatment.  I also have discussed with her that the radiation oncology community is moving toward moderately hypofractionated treatment even for the treatment of the ipsilateral regional nodes.  Therefore even though the [place] trial was done with what was the standard fractionation at that time, AKA 50 Morris in 25 fraction, I am not opposed to treating air with the moderately hypofractionated schedule of the [place] trial, AKA 42.56 Gray at 2.66 Gray per  fraction, followed by a boost of dose to the lumpectomy site for additional 10 Gray in 5 fractions. The patient voices interest for the second type of scheduling, she was understanding and signed a consent. I will call Dr. Larose Bamberger to try and understand if the patient will indeed undergo systemic chemotherapy. I have spent 60 minutes reviewing the case, discussing it with my nurse, examining the patient and discussing all the issues with the patient and the family. NCCN guidelines, version 2020 is used to help review treatment recommendations. Procedures and process involved with CT simulation and daily radiation treatments were explained. Toxicities, both expected and less common, were reviewed in detail. Questions were answered to their apparent satisfaction. Thank you for the opportunity to participate in multidisciplinary management of this remarkable and pleasant patient.       Pavel Vogel MD    Department of Radiation Oncology  [facility]) [facility]: [phone] ([facility]: [phone])  [address] [place] [address]: [phone] (NDY: [phone])  [facility]:  [phone] (UER:  [phone])

## 2023-03-06 ENCOUNTER — TELEPHONE (OUTPATIENT)
Dept: CASE MANAGEMENT | Age: 73
End: 2023-03-06

## 2023-03-06 NOTE — TELEPHONE ENCOUNTER
Called patient Friday 3-3-2023 re: Patient CT appointment scheduled in April. I asked patient if I could attempt to have appointment changed, patient agreeable. I was able to have patient appointment changed to 3- same day as her previously scheduled Bone Scan. I spoke with patient and she is agreeable to new dates and times. Encouraged patient to call with any further needs. Patient verbalizes understanding. Also confirmed with patient appointment with Dr Cherry Shen on 3- to review testing.

## 2023-03-15 ENCOUNTER — HOSPITAL ENCOUNTER (OUTPATIENT)
Dept: NUCLEAR MEDICINE | Age: 73
Discharge: HOME | End: 2023-03-17

## 2023-03-15 ENCOUNTER — HOSPITAL ENCOUNTER (OUTPATIENT)
Dept: NUCLEAR MEDICINE | Age: 73
Discharge: HOME | End: 2023-03-17
Payer: MEDICARE

## 2023-03-15 ENCOUNTER — HOSPITAL ENCOUNTER (OUTPATIENT)
Dept: CT IMAGING | Age: 73
Discharge: HOME | End: 2023-03-17
Payer: MEDICARE

## 2023-03-15 DIAGNOSIS — C18.9 MALIGNANT NEOPLASM OF COLON, UNSPECIFIED PART OF COLON (HCC): ICD-10-CM

## 2023-03-15 PROCEDURE — 3430000000 HC RX DIAGNOSTIC RADIOPHARMACEUTICAL: Performed by: RADIOLOGY

## 2023-03-15 PROCEDURE — A9503 TC99M MEDRONATE: HCPCS | Performed by: RADIOLOGY

## 2023-03-15 PROCEDURE — 71250 CT THORAX DX C-: CPT

## 2023-03-15 PROCEDURE — 74176 CT ABD & PELVIS W/O CONTRAST: CPT

## 2023-03-15 PROCEDURE — 78306 BONE IMAGING WHOLE BODY: CPT | Performed by: INTERNAL MEDICINE

## 2023-03-15 RX ORDER — TC 99M MEDRONATE 20 MG/10ML
25 INJECTION, POWDER, LYOPHILIZED, FOR SOLUTION INTRAVENOUS
Status: COMPLETED | OUTPATIENT
Start: 2023-03-15 | End: 2023-03-15

## 2023-03-15 RX ADMIN — TC 99M MEDRONATE 25 MILLICURIE: 20 INJECTION, POWDER, LYOPHILIZED, FOR SOLUTION INTRAVENOUS at 12:05

## 2023-03-17 ENCOUNTER — TELEPHONE (OUTPATIENT)
Dept: CASE MANAGEMENT | Age: 73
End: 2023-03-17

## 2023-03-17 ENCOUNTER — OFFICE VISIT (OUTPATIENT)
Dept: ONCOLOGY | Age: 73
End: 2023-03-17

## 2023-03-17 ENCOUNTER — HOSPITAL ENCOUNTER (OUTPATIENT)
Dept: INFUSION THERAPY | Age: 73
Discharge: HOME | End: 2023-03-17
Payer: MEDICARE

## 2023-03-17 VITALS
HEART RATE: 73 BPM | WEIGHT: 173.9 LBS | OXYGEN SATURATION: 99 % | RESPIRATION RATE: 16 BRPM | DIASTOLIC BLOOD PRESSURE: 74 MMHG | SYSTOLIC BLOOD PRESSURE: 167 MMHG | BODY MASS INDEX: 31.81 KG/M2 | TEMPERATURE: 98.5 F

## 2023-03-17 DIAGNOSIS — C18.9 MALIGNANT NEOPLASM OF COLON, UNSPECIFIED PART OF COLON (HCC): ICD-10-CM

## 2023-03-17 DIAGNOSIS — Z78.0 POSTMENOPAUSAL: Primary | ICD-10-CM

## 2023-03-17 LAB
BASOPHILS # BLD: 0.09 E9/L (ref 0–0.2)
BASOPHILS NFR BLD: 1 % (ref 0–2)
EOSINOPHIL # BLD: 0.3 E9/L (ref 0.05–0.5)
EOSINOPHIL NFR BLD: 3.5 % (ref 0–6)
ERYTHROCYTE [DISTWIDTH] IN BLOOD BY AUTOMATED COUNT: 12.1 FL (ref 11.5–15)
HCT VFR BLD AUTO: 43.3 % (ref 34–48)
HGB BLD-MCNC: 14.3 G/DL (ref 11.5–15.5)
IMM GRANULOCYTES # BLD: 0.02 E9/L
IMM GRANULOCYTES NFR BLD: 0.2 % (ref 0–5)
LYMPHOCYTES # BLD: 2.26 E9/L (ref 1.5–4)
LYMPHOCYTES NFR BLD: 26.1 % (ref 20–42)
MCH RBC QN AUTO: 31.2 PG (ref 26–35)
MCHC RBC AUTO-ENTMCNC: 33 % (ref 32–34.5)
MCV RBC AUTO: 94.3 FL (ref 80–99.9)
MONOCYTES # BLD: 0.75 E9/L (ref 0.1–0.95)
MONOCYTES NFR BLD: 8.7 % (ref 2–12)
NEUTROPHILS # BLD: 5.24 E9/L (ref 1.8–7.3)
NEUTS SEG NFR BLD: 60.5 % (ref 43–80)
PLATELET # BLD AUTO: 167 E9/L (ref 130–450)
PMV BLD AUTO: 8.9 FL (ref 7–12)
RBC # BLD AUTO: 4.59 E12/L (ref 3.5–5.5)
WBC # BLD: 8.7 E9/L (ref 4.5–11.5)

## 2023-03-17 PROCEDURE — 36415 COLL VENOUS BLD VENIPUNCTURE: CPT

## 2023-03-17 PROCEDURE — 85025 COMPLETE CBC W/AUTO DIFF WBC: CPT

## 2023-03-17 RX ORDER — ANASTROZOLE 1 MG/1
1 TABLET ORAL DAILY
Qty: 90 TABLET | Refills: 3 | Status: SHIPPED | OUTPATIENT
Start: 2023-03-17

## 2023-03-17 NOTE — PROGRESS NOTES
701  [facility] MED ONCOLOGY  [facility] [address]  Dept: 71 Yessi Torres: [phone]  Attending Progress Note      Reason for Visit:   1. Recurrent VTE. 2. Factor V Leiden Homozygosity. 3. Colon cancer. Referring Physician:  Bladimir Larsen NP    PCP:  David Dawkins MD    History of Present Illness: The patient is a pleasant 77-year-old lady with a PMH significant for obesity, HTN, and hyperlipidemia, who was diagnosed with PE and bilateral DVTs that were provoked following ventral hernia repair in March 2017, she had her surgery done in [facility], she was anticoagulated with Coumadin, she was complaining of left knee pain, had a venous US done on 3/1/2019, revealing an acute DVT in the left posterior tibial vein, chronic appearing thromus from proximal femoral to peroneal veins. She was started on Lovenox 100 mg subq bid, INR was therapeutic, 2.2. She does not like the injections, prefers to be on an oral anticoagulant, AC was changed to Eliquis. FH is negative for VTE. The patient was last seen in the office in November 2020, the patient was diagnosed with colon cancer, she underwent a right hemicolectomy on 11/28/2020 by Dr. Tyler Ulloa at [facility], was diagnosed with stage I colon cancer,  Afterwards the patient was peritoneal and was reexplored with findings of missed enterotomy which was repaired. After this second surgery experienced a cardiopulmonary arrest, ROSC was achieved and she was intubated. Patient was transferred from [facility] to the [facility] clinic facility. On 11/27 she underwent exploratory laparotomy with small bowel resection plus loop ileostomy plus distal mucous fistula and excision of mesh. Afterwards patient developed an abdominal abscess that required IR drainage on 12/3. Patient underwent a tracheostomy on 12/8.      The patient was found to have an area of parenchymal distortion in the left breast, which was consistent with mild fibrocystic changes,had a repeat left breast biopsy done on 11/21/2022, pathology was consistent with invasive ductal carcinoma, grade 3, with associated DCIS, ER +90%, DE +70%, HER2/stephane negative, 1+ by IHC. Ki-67 is 70%. The patient underwent on 2/1/2023 left localized breast lumpectomy with left axillary sentinel lymph node excisional biopsy, pathology:   CANCER CASE SUMMARY   Procedure -excision   Specimen Laterality -left   Tumor Site, Invasive Carcinoma-lower outer quadrant   Histologic Type -invasive carcinoma, no special type (ductal)   Histologic Grade (Nemo Histologic Score):                    Tubule differentiation- score-3                    Nuclear pleomorphism- score 3                    Mitotic rate- score 2                    Overall Grade- grade 3, (score 8)   Tumor Size: Size of Largest Invasive Carcinoma-approximately 16 mm   Ductal Carcinoma In Situ (DCIS)-present, negative for extensive   intraductal component   Lymphovascular Invasion-not identified   Treatment Effect in the Breast-no known presurgical therapy   Margin status for invasive carcinoma: All margins negative for invasive   carcinoma   Distance from invasive carcinoma to closest margin-2 mm/inferior   Margin Status for DCIS-all margins negative for DCIS   Distance from DCIS to closest margin (if applicable)-5 mm/medial   Regional Lymph Nodes: Tumor present in regional lymph node (sentinel)   Number of lymph nodes with macrometastases-1   Size of largest ariadna metastatic deposit 3 mm   Extranodal extension-not identified   Total number of lymph nodes examined-1 (sentinel)   Pathologic Stage Classification (AJCC 8th Edition)- pT1c, p(sn)N1a   Special studies-ER positive, DE positive, HER2 negative, Ki-67 index 70%   Additional Note: Intradepartmental consultation is obtained.      The patient is nervous about the results, she is accompanied by her daughter. Review of Systems;  CONSTITUTIONAL: No fever, chills. Good appetite, feeling tired. ENMT: Eyes: No diplopia; Nose: Pos for epistaxis. Mouth: No sore throat. RESPIRATORY: No hemoptysis, shortness of breath, cough. CARDIOVASCULAR: No chest pain, palpitations. GASTROINTESTINAL: No nausea/vomiting, abdominal pain, diarrhea/constipation. GENITOURINARY: No dysuria, urinary frequency, hematuria. Pos dizziness. NEURO: No syncope, presyncope, headache.   Remainder:  ROS NEGATIVE    Past Medical History:      Diagnosis Date    Colon cancer ([facility] 11/01/2020    Hyperlipidemia     Hypertension     LBBB (left bundle branch block)     Obesity      Patient Active Problem List   Diagnosis    Mixed hyperlipidemia    Essential hypertension    LBBB (left bundle branch block)    Chronic deep vein thrombosis (DVT) of lower extremity ([facility])    Personal history of pulmonary embolism    Type 2 diabetes mellitus with complication, without long-term current use of insulin ([facility])    Chronic diastolic (congestive) heart failure ([facility])    Vitamin D deficiency    Venous insufficiency    Age-related osteoporosis without current pathological fracture    Other specified hypothyroidism    Primary generalized (osteo)arthritis    Chronic pain syndrome    Morbid obesity with BMI of 45.0-49.9, adult ([facility])    Seasonal allergic rhinitis due to pollen    Moderate persistent asthma with acute exacerbation    Class 3 severe obesity due to excess calories with serious comorbidity and body mass index (BMI) of 45.0 to 49.9 in adult [facility])    Encounter for immunization    Factor V deficiency ([facility]    Iron deficiency anemia due to chronic blood loss    Acute kidney injury ([facility]    CKD (chronic kidney disease) stage 3, GFR 30-59 ml/min ([facility])    Hyponatremia    Hyperkalemia    Hypotension    Prolonged Q-T interval on ECG    Sepsis ([facility])    Severe protein-calorie malnutrition ([address]    Malignant neoplasm of left breast ([address] Past Surgical History:      Procedure Laterality Date    APPENDECTOMY      BREAST BIOPSY Left 2/1/2023    left blue dye injection, left localized breast lumpectomy, left axillary sentinel lymph node excision, performed by Jigar Elizondo MD at [address] 11/2020    COLOSTOMY  01/01/2007    due to divertiulosis since than it was reversed    HERNIA REPAIR      HYSTERECTOMY, TOTAL ABDOMINAL (CERVIX REMOVED)  01/01/1990    US BREAST BIOPSY W LOC DEVICE 1ST LESION LEFT Left 09/06/2022    Benign    US BREAST BIOPSY W LOC DEVICE 1ST LESION LEFT Left 11/21/2022    US BREAST NEEDLE BIOPSY LEFT 11/21/2022 [facility]    US GUIDED NEEDLE LOC OF LEFT BREAST Left 2/1/2023    US GUIDED NEEDLE LOC OF LEFT BREAST 2/1/2023 [facility]       Family History:  Family History   Problem Relation Age of Onset    Cancer Mother 62        lung and brain cancer    Diabetes Father     Kidney Disease Father     Cancer Sister 25        leukemia    Cancer Paternal Grandmother 80        colon cancer    Cancer Son 43        kidney cancer       Medications:  Reviewed and reconciled.     Social History:  Social History     Socioeconomic History    Marital status:      Spouse name: Not on file    Number of children: Not on file    Years of education: Not on file    Highest education level: Not on file   Occupational History    Not on file   Tobacco Use    Smoking status: Former     Packs/day: 1.00     Years: 40.00     Pack years: 40.00     Types: Cigarettes     Quit date: 1/1/2008     Years since quitting: 15.2    Smokeless tobacco: Never   Vaping Use    Vaping Use: Never used   Substance and Sexual Activity    Alcohol use: Yes     Comment: rarely    Drug use: No    Sexual activity: Not Currently   Other Topics Concern    Not on file   Social History Narrative    Not on file     Social Determinants of Health     Financial Resource Strain: Not on file   Food Insecurity: Not on file   Transportation Needs: Not on file   Physical Activity: Not on file   Stress: Not on file   Social Connections: Not on file   Intimate Partner Violence: Not on file   Housing Stability: Not on file       Allergies: Allergies   Allergen Reactions    Amlodipine      Hives      Cefdinir     E-Mycin [Erythromycin]      Made her dizzy and she didn't feel well    Lisinopril     Seasonal        Physical Exam:  BP (!) 167/74   Pulse 73   Temp 98.5 °F (36.9 °C) (Temporal)   Resp 16   Wt 173 lb 14.4 oz (78.9 kg)   SpO2 99%   BMI 31.81 kg/m²     GENERAL: Alert, oriented x 3, not in acute distress. HEENT: PERRLA; EOMI. Oropharynx clear. NECK: Supple. No palpable cervical or supraclavicular lymphadenopathy. LUNGS: Good air entry bilaterally. No wheezing, crackles or rhonchi. CARDIOVASCULAR: Regular rate. No murmurs, rubs or gallops. BREASTS: The left breast exam is remarkable for an incision from her lumpectomy, it is healing nicely, no palpable masses, no nipple discharge, no palpable left axillary lymphadenopathy, the right breast exam is negative for skin changes, no nipple discharge, no palpable masses, no palpable right axillary lymphadenopathy. ABDOMEN: Soft, nontender. EXTREMITIES: mildt bilateral LLE, she is wearing compression stockings, no tenderness/erythema or warmth of the calves. NEUROLOGIC: No focal deficits. Impression/Plan:     The patient is a pleasant 68-year-old lady with a PMH significant for obesity, HTN, and hyperlipidemia, who was diagnosed with PE and bilateral DVTs that were provoked following ventral hernia repair in March 2017, she had her surgery done in [facility], she was anticoagulated with Coumadin, she was complaining of left knee pain, had a venous US done on 3/1/2019, revealing an acute DVT in the left posterior tibial vein, chronic appearing thromus from proximal femoral to peroneal veins. She was started on Lovenox 100 mg subq bid, INR was therapeutic, 2.2.  She does not like the injections, prefers to be on an oral anticoagulant. Lovenox was d/sade and she was started on Eliquis. FH is negative for VTE. Patient with history of provoked PE and bilateral DVTs in 2017, she has recurrent VTE, ordered testing for inherited thrombophilia that is reliable in the setting of acute thrombosis. She is homozygous for factor V Leiden mutation, this was discussed with the patient and her son, ACLA are neg, anti B2GPI Antibodies are neg, PT gene mutation is negative. We discussed that obesity and decreased mobility increase the risk of VTE, I encouraged her to continue with weight loss. She had a f/up LLE venous US done revealing improvement of the DVT, she has a persistent occlusive thrombus in the proximal left superficial femoral vein and nonocclusive thrombus in the mid to distal superficial femoral vein, it is hard to tell for how long she had had those clots, continue  Eliquis, I do recommend lifelong anticoagulation unless if she has a contraindication to [facility] in the future. The patient needs financial assistance on EliquA-STAR, referral was placed to [employer]. Will be provided with samples today. The patient was last seen in the office in November 2020, the patient was diagnosed with colon cancer, CEA was 0.6, there was no evidence of metastatic disease she underwent a right hemicolectomy on 11/28/2020 by Dr. Evelyn Avila at [facility], was diagnosed with stage I colon cancer,  Afterwards the patient was peritoneal and was reexplored with findings of missed enterotomy which was repaired. After this second surgery experienced a cardiopulmonary arrest, ROSC was achieved and she was intubated. Patient was transferred from [facility] to the [facility] clinic facility. On 11/27 she underwent exploratory laparotomy with small bowel resection plus loop ileostomy plus distal mucous fistula and excision of mesh. Afterwards patient developed an abdominal abscess that required IR drainage.     Epistaxis had improved. Discussed possibly decreasing the dose of Eliquis 2.5 mg p.o. twice daily if she continues to have epistaxis. her hemoglobin/hematocrit are normal.  Eliquis to be held 5 days prior to the breast surgery. The patient was found to have an area of parenchymal distortion in the left breast, which was consistent with mild fibrocystic changes, the patient was seen by Dr. Anam Yo, the patient had a repeat left breast biopsy done on 11/21/2022, pathology was consistent with invasive ductal carcinoma, grade 3, with associated DCIS, ER +90%, MN +70%, HER2/stephane negative, 1+ by IHC. Ki-67 is 70%. The patient underwent on 2/1/2023 left localized breast lumpectomy with left axillary sentinel lymph node excisional biopsy, pathology was consistent with invasive carcinoma, ductal, grade 3, with associated DCIS, negative for extensive intraductal component, no lymphovascular invasion, margins are negative, 1 sentinel lymph node was removed, was positive for macrometastasis without extranodal extension, final pathologic stage pT1c, p(sn)N1aMx. I reviewed with the patient her pathology results, diagnosis, prognosis, and recommendations for treatment. Staging CT scans of the chest, abdomen, pelvis and a bone scan were done on 3/15/2023, were negative for metastatic disease. The patient had an Oncotype DX done, recurrence score is 28, distant recurrence risk at 9 years with adjuvant endocrine therapy alone is 22%, chemotherapy benefit for this group cannot be excluded, I discussed with the patient adjuvant chemotherapy with TC, she would not be able to tolerate dose dense AC-T, the side effects and the schedule of treatment were reviewed with the patient. She had decided against chemotherapy. We will notify radiation oncology to plan adjuvant RT.   Discussed with the patient adjuvant endocrine therapy, Arimidex, the side effects were reviewed with the patient, including but not limited to hot flashes, mood changes, joint pain/stiffness/discomfort and osteoporosis, DEXA scan was ordered. We will start Arimidex. The patient is a candidate for treatment with Verzenio, the side effects were reviewed with her, she is agreeable to try the Verzenio, due to the issues with her ostomy, we might start at 100 mg p.o. twice daily and increase the dose if well-tolerated. RTC in 2 months. Thank you for allowing us to participate in the care of Mrs. Banuelos.     Oswaldo Velasco MD   HEMATOLOGY/MEDICAL [address] [address] [facility] MED ONCOLOGY  [address] [place] [id number]  Dept: 71 [facility]: [phone]

## 2023-03-17 NOTE — TELEPHONE ENCOUNTER
Spoke to Dr Dory Mix re: patient declining recommended chemo at this time. Ok to move forward with RT. Will notify uJan Chambers RN NN in [place] of the above.

## 2023-03-20 ENCOUNTER — TELEPHONE (OUTPATIENT)
Dept: CASE MANAGEMENT | Age: 73
End: 2023-03-20

## 2023-03-20 NOTE — TELEPHONE ENCOUNTER
Patient has decided against chemotherapy and will proceed with RT followed by an AI. I reached out to radiation team in [facility] to let them know the patient can be scheduled for her CT simulation.

## 2023-03-24 ENCOUNTER — HOSPITAL ENCOUNTER (OUTPATIENT)
Dept: RADIATION ONCOLOGY | Age: 73
Discharge: HOME | End: 2023-03-24
Payer: MEDICARE

## 2023-03-24 PROCEDURE — 77334 RADIATION TREATMENT AID(S): CPT | Performed by: RADIOLOGY

## 2023-03-24 PROCEDURE — 77290 THER RAD SIMULAJ FIELD CPLX: CPT | Performed by: RADIOLOGY

## 2023-04-03 ENCOUNTER — HOSPITAL ENCOUNTER (OUTPATIENT)
Dept: RADIATION ONCOLOGY | Age: 73
Discharge: HOME | End: 2023-04-03

## 2023-04-03 PROCEDURE — 77300 RADIATION THERAPY DOSE PLAN: CPT | Performed by: RADIOLOGY

## 2023-04-03 PROCEDURE — 77295 3-D RADIOTHERAPY PLAN: CPT | Performed by: RADIOLOGY

## 2023-04-03 PROCEDURE — 77334 RADIATION TREATMENT AID(S): CPT | Performed by: RADIOLOGY

## 2023-04-03 PROCEDURE — 77293 RESPIRATOR MOTION MGMT SIMUL: CPT | Performed by: RADIOLOGY

## 2023-04-04 ENCOUNTER — HOSPITAL ENCOUNTER (OUTPATIENT)
Dept: RADIATION ONCOLOGY | Age: 73
End: 2023-04-04
Payer: MEDICARE

## 2023-04-05 ENCOUNTER — HOSPITAL ENCOUNTER (OUTPATIENT)
Dept: GENERAL RADIOLOGY | Age: 73
Discharge: HOME | End: 2023-04-07
Payer: MEDICARE

## 2023-04-05 DIAGNOSIS — Z78.0 POSTMENOPAUSAL: ICD-10-CM

## 2023-04-05 PROCEDURE — 77080 DXA BONE DENSITY AXIAL: CPT

## 2023-04-06 ENCOUNTER — HOSPITAL ENCOUNTER (OUTPATIENT)
Dept: RADIATION ONCOLOGY | Age: 73
Discharge: HOME | End: 2023-04-06

## 2023-04-06 PROCEDURE — 77417 THER RADIOLOGY PORT IMAGE(S): CPT | Performed by: RADIOLOGY

## 2023-04-07 ENCOUNTER — APPOINTMENT (OUTPATIENT)
Dept: RADIATION ONCOLOGY | Age: 73
End: 2023-04-07

## 2023-04-10 ENCOUNTER — APPOINTMENT (OUTPATIENT)
Dept: RADIATION ONCOLOGY | Age: 73
End: 2023-04-10

## 2023-04-11 ENCOUNTER — APPOINTMENT (OUTPATIENT)
Dept: RADIATION ONCOLOGY | Age: 73
End: 2023-04-11

## 2023-04-17 ENCOUNTER — HOSPITAL ENCOUNTER (OUTPATIENT)
Dept: RADIATION ONCOLOGY | Age: 73
Discharge: HOME | End: 2023-04-17

## 2023-04-17 PROCEDURE — 77427 RADIATION TX MANAGEMENT X5: CPT | Performed by: RADIOLOGY

## 2023-04-17 PROCEDURE — 77412 RADIATION TX DELIVERY LVL 3: CPT | Performed by: RADIOLOGY

## 2023-04-18 ENCOUNTER — HOSPITAL ENCOUNTER (OUTPATIENT)
Dept: RADIATION ONCOLOGY | Age: 73
Discharge: HOME | End: 2023-04-18

## 2023-04-18 PROCEDURE — 77412 RADIATION TX DELIVERY LVL 3: CPT | Performed by: RADIOLOGY

## 2023-04-18 PROCEDURE — 77417 THER RADIOLOGY PORT IMAGE(S): CPT | Performed by: RADIOLOGY

## 2023-04-18 PROCEDURE — 77336 RADIATION PHYSICS CONSULT: CPT | Performed by: RADIOLOGY

## 2023-04-19 ENCOUNTER — HOSPITAL ENCOUNTER (OUTPATIENT)
Dept: RADIATION ONCOLOGY | Age: 73
Discharge: HOME | End: 2023-04-19

## 2023-04-19 VITALS
HEART RATE: 72 BPM | WEIGHT: 171.5 LBS | TEMPERATURE: 97.2 F | SYSTOLIC BLOOD PRESSURE: 148 MMHG | RESPIRATION RATE: 20 BRPM | BODY MASS INDEX: 31.37 KG/M2 | DIASTOLIC BLOOD PRESSURE: 75 MMHG

## 2023-04-19 DIAGNOSIS — Z17.0 MALIGNANT NEOPLASM OF LOWER-OUTER QUADRANT OF LEFT BREAST OF FEMALE, ESTROGEN RECEPTOR POSITIVE (HCC): Primary | ICD-10-CM

## 2023-04-19 DIAGNOSIS — C50.512 MALIGNANT NEOPLASM OF LOWER-OUTER QUADRANT OF LEFT BREAST OF FEMALE, ESTROGEN RECEPTOR POSITIVE (HCC): Primary | ICD-10-CM

## 2023-04-19 PROCEDURE — 77412 RADIATION TX DELIVERY LVL 3: CPT | Performed by: RADIOLOGY

## 2023-04-19 NOTE — PROGRESS NOTES
Tamera Banuelos  4/19/2023  Wt Readings from Last 3 Encounters:   04/19/23 171 lb 8 oz (77.8 kg)   04/12/23 173 lb (78.5 kg)   03/17/23 173 lb 14.4 oz (78.9 kg)     Body mass index is 31.37 kg/m². Treatment Area:Left breast    Patient was seen today for weekly visit. Comfort Alteration  KPS:70%  Fatigue: Moderate    Nutritional Alteration  Anorexia: No   Nausea: No   Vomiting: No     Skin Alteration   Sensation:na    Radiation Dermatitis:  none    Mucous Membrane Alteration  Drainage: No  Lymphedema: No    Emotional  Coping: effective    Sexuality Alteration  na    Injury, potential bleeding or infection: na        Lab Results   Component Value Date    WBC 8.7 03/17/2023     03/17/2023         Pulse 72   Temp 97.2 °F (36.2 °C) (Temporal)   Resp 20   Wt 171 lb 8 oz (77.8 kg)   BMI 31.37 kg/m²   BP within normal range?  yes       Assessment/Plan:  Completed 6/20 fractions; 1596/5256 cGy    Shelbie Jade RN

## 2023-04-19 NOTE — PROGRESS NOTES
DEPARTMENT OF RADIATION ONCOLOGY   ON TREATMENT VISIT       4/19/2023      NAME:  Jacklyn Banuelos    YOB: 1950    DIAGNOSIS: Pathological stage pT1c, PN 1 (SN, cM0, G3, ER/ME positive, HER2 negative, invasive ductal carcinoma of the lower outer quadrant of the left breast, SP partial mastectomy and sentinel node biopsy. SUBJECTIVE:   Dion Burnett has now received 1596 cGy in 6 fractions directed to the left breast.      Past medical, surgical, social and family histories reviewed and updated as indicated.     PAIN: No    ALLERGIES:  Amlodipine, Cefdinir, E-mycin [erythromycin], Lisinopril, and Seasonal         Current Outpatient Medications   Medication Sig Dispense Refill    anastrozole (ARIMIDEX) 1 MG tablet Take 1 tablet by mouth daily 90 tablet 3    allopurinol (ZYLOPRIM) 100 MG tablet Take by mouth      hydrOXYzine HCl (ATARAX) 10 MG tablet Take by mouth      levocetirizine (XYZAL) 5 MG tablet Take by mouth      Magnesium 400 MG TABS Take 400 mg by mouth daily      sodium chloride 1 g tablet Take 1 g by mouth 3 times daily      NONFORMULARY in the morning, at noon, and at bedtime collogen and vitamin c      Multiple Vitamins-Minerals (THERAPEUTIC MULTIVITAMIN-MINERALS) tablet Take 1 tablet by mouth daily      metoprolol succinate (TOPROL XL) 25 MG extended release tablet Take 0.5 tablets by mouth daily (Patient taking differently: Take 12.5 mg by mouth at bedtime) 90 tablet 3    ferrous sulfate (IRON 325) 325 (65 Fe) MG tablet ferrous sulfate Ferrous Sulfate Active 325 MG Oral Daily After A Meal 100 October 23rd, 2020 10:46am 10-  [facility] ([id number])      apixaban (ELIQUIS) 5 MG TABS tablet Take 1 tablet by mouth 2 times daily 84 tablet 0    lovastatin (MEVACOR) 40 MG tablet Take 1 tablet by mouth nightly 90 tablet 1    PROAIR  (90 Base) MCG/ACT inhaler Inhale 1 puff into the lungs 4 times daily 3 Inhaler 1    Cholecalciferol (VITAMIN D3) 25 MCG (1000

## 2023-04-20 ENCOUNTER — HOSPITAL ENCOUNTER (OUTPATIENT)
Dept: RADIATION ONCOLOGY | Age: 73
Discharge: HOME | End: 2023-04-20

## 2023-04-20 PROCEDURE — 77412 RADIATION TX DELIVERY LVL 3: CPT | Performed by: RADIOLOGY

## 2023-04-21 ENCOUNTER — HOSPITAL ENCOUNTER (OUTPATIENT)
Dept: RADIATION ONCOLOGY | Age: 73
Discharge: HOME | End: 2023-04-21

## 2023-04-21 PROCEDURE — 77412 RADIATION TX DELIVERY LVL 3: CPT | Performed by: RADIOLOGY

## 2023-04-24 ENCOUNTER — HOSPITAL ENCOUNTER (OUTPATIENT)
Dept: RADIATION ONCOLOGY | Age: 73
Discharge: HOME | End: 2023-04-24

## 2023-04-24 PROCEDURE — 77412 RADIATION TX DELIVERY LVL 3: CPT | Performed by: RADIOLOGY

## 2023-04-25 ENCOUNTER — HOSPITAL ENCOUNTER (OUTPATIENT)
Dept: RADIATION ONCOLOGY | Age: 73
Discharge: HOME | End: 2023-04-25

## 2023-04-25 PROCEDURE — 77417 THER RADIOLOGY PORT IMAGE(S): CPT | Performed by: RADIOLOGY

## 2023-04-25 PROCEDURE — 77412 RADIATION TX DELIVERY LVL 3: CPT | Performed by: RADIOLOGY

## 2023-04-25 PROCEDURE — 77336 RADIATION PHYSICS CONSULT: CPT | Performed by: RADIOLOGY

## 2023-04-26 ENCOUNTER — HOSPITAL ENCOUNTER (OUTPATIENT)
Dept: RADIATION ONCOLOGY | Age: 73
Discharge: HOME | End: 2023-04-26

## 2023-04-26 VITALS
BODY MASS INDEX: 30.91 KG/M2 | WEIGHT: 169 LBS | HEART RATE: 80 BPM | TEMPERATURE: 98 F | DIASTOLIC BLOOD PRESSURE: 70 MMHG | SYSTOLIC BLOOD PRESSURE: 146 MMHG | RESPIRATION RATE: 18 BRPM

## 2023-04-26 DIAGNOSIS — Z17.0 MALIGNANT NEOPLASM OF LOWER-OUTER QUADRANT OF LEFT BREAST OF FEMALE, ESTROGEN RECEPTOR POSITIVE (HCC): Primary | ICD-10-CM

## 2023-04-26 DIAGNOSIS — C50.512 MALIGNANT NEOPLASM OF LOWER-OUTER QUADRANT OF LEFT BREAST OF FEMALE, ESTROGEN RECEPTOR POSITIVE (HCC): Primary | ICD-10-CM

## 2023-04-26 PROCEDURE — 77412 RADIATION TX DELIVERY LVL 3: CPT | Performed by: RADIOLOGY

## 2023-04-26 NOTE — PROGRESS NOTES
DEPARTMENT OF RADIATION ONCOLOGY   ON TREATMENT VISIT       4/26/2023      NAME:  Bill Banuelos    YOB: 1950    DIAGNOSIS: Pathological stage pT1c, PN 1 (SN, cM0, G3, ER/AK positive, HER2 negative, invasive ductal carcinoma of the lower outer quadrant of the left breast, SP partial mastectomy and sentinel node biopsy. SUBJECTIVE:   Cyndi Gonzalez has now received 2926 cGy in 11 fractions directed to the left breast.      Past medical, surgical, social and family histories reviewed and updated as indicated.     PAIN: No    ALLERGIES:  Amlodipine, Cefdinir, E-mycin [erythromycin], Lisinopril, and Seasonal         Current Outpatient Medications   Medication Sig Dispense Refill    anastrozole (ARIMIDEX) 1 MG tablet Take 1 tablet by mouth daily 90 tablet 3    allopurinol (ZYLOPRIM) 100 MG tablet Take by mouth      hydrOXYzine HCl (ATARAX) 10 MG tablet Take by mouth      levocetirizine (XYZAL) 5 MG tablet Take by mouth      Magnesium 400 MG TABS Take 400 mg by mouth daily      sodium chloride 1 g tablet Take 1 g by mouth 3 times daily      NONFORMULARY in the morning, at noon, and at bedtime collogen and vitamin c      Multiple Vitamins-Minerals (THERAPEUTIC MULTIVITAMIN-MINERALS) tablet Take 1 tablet by mouth daily      metoprolol succinate (TOPROL XL) 25 MG extended release tablet Take 0.5 tablets by mouth daily (Patient taking differently: Take 12.5 mg by mouth at bedtime) 90 tablet 3    ferrous sulfate (IRON 325) 325 (65 Fe) MG tablet ferrous sulfate Ferrous Sulfate Active 325 MG Oral Daily After A Meal 100 October 23rd, 2020 10:46am 10-  [facility] ([id number])      apixaban (ELIQUIS) 5 MG TABS tablet Take 1 tablet by mouth 2 times daily 84 tablet 0    lovastatin (MEVACOR) 40 MG tablet Take 1 tablet by mouth nightly 90 tablet 1    PROAIR  (90 Base) MCG/ACT inhaler Inhale 1 puff into the lungs 4 times daily 3 Inhaler 1    Cholecalciferol (VITAMIN D3) 25 MCG

## 2023-04-26 NOTE — PROGRESS NOTES
Teri Banuelos  4/26/2023  Wt Readings from Last 3 Encounters:   04/26/23 169 lb (76.7 kg)   04/19/23 171 lb 8 oz (77.8 kg)   04/12/23 173 lb (78.5 kg)     Body mass index is 30.91 kg/m². Treatment Area:Left breast    Patient was seen today for weekly visit. Comfort Alteration  KPS:70%  Fatigue: Mild    Nutritional Alteration  Anorexia: No   Nausea: No   Vomiting: No     Skin Alteration   Sensation:none    Radiation Dermatitis:  none    Mucous Membrane Alteration  Drainage: No  Lymphedema: No    Emotional  Coping: effective    Sexuality Alteration  na    Injury, potential bleeding or infection: na        Lab Results   Component Value Date    WBC 8.7 03/17/2023     03/17/2023         BP (!) 146/70   Pulse 80   Temp 98 °F (36.7 °C) (Temporal)   Resp 18   Wt 169 lb (76.7 kg)   BMI 30.91 kg/m²   BP within normal range?  yes       Assessment/Plan:  Completed 11/20 fractions; 2926/5256 cGy    Yvonne Morataya RN

## 2023-04-27 ENCOUNTER — HOSPITAL ENCOUNTER (OUTPATIENT)
Dept: RADIATION ONCOLOGY | Age: 73
Discharge: HOME | End: 2023-04-27

## 2023-04-27 PROCEDURE — 77412 RADIATION TX DELIVERY LVL 3: CPT | Performed by: RADIOLOGY

## 2023-04-28 ENCOUNTER — HOSPITAL ENCOUNTER (OUTPATIENT)
Dept: RADIATION ONCOLOGY | Age: 73
Discharge: HOME | End: 2023-04-28

## 2023-04-28 PROCEDURE — 77412 RADIATION TX DELIVERY LVL 3: CPT | Performed by: RADIOLOGY

## 2023-05-01 ENCOUNTER — HOSPITAL ENCOUNTER (OUTPATIENT)
Dept: RADIATION ONCOLOGY | Age: 73
Discharge: HOME | End: 2023-05-01
Payer: MEDICARE

## 2023-05-01 PROCEDURE — 77412 RADIATION TX DELIVERY LVL 3: CPT | Performed by: RADIOLOGY

## 2023-05-02 ENCOUNTER — HOSPITAL ENCOUNTER (OUTPATIENT)
Dept: RADIATION ONCOLOGY | Age: 73
Discharge: HOME | End: 2023-05-02
Payer: MEDICARE

## 2023-05-02 PROCEDURE — 77412 RADIATION TX DELIVERY LVL 3: CPT | Performed by: RADIOLOGY

## 2023-05-02 PROCEDURE — 77280 THER RAD SIMULAJ FIELD SMPL: CPT | Performed by: RADIOLOGY

## 2023-05-02 PROCEDURE — 77427 RADIATION TX MANAGEMENT X5: CPT | Performed by: RADIOLOGY

## 2023-05-02 PROCEDURE — 77336 RADIATION PHYSICS CONSULT: CPT | Performed by: RADIOLOGY

## 2023-05-03 ENCOUNTER — HOSPITAL ENCOUNTER (OUTPATIENT)
Dept: RADIATION ONCOLOGY | Age: 73
Discharge: HOME | End: 2023-05-03
Payer: MEDICARE

## 2023-05-03 VITALS
WEIGHT: 169 LBS | TEMPERATURE: 98.1 F | HEART RATE: 66 BPM | RESPIRATION RATE: 18 BRPM | BODY MASS INDEX: 30.91 KG/M2 | DIASTOLIC BLOOD PRESSURE: 75 MMHG | SYSTOLIC BLOOD PRESSURE: 136 MMHG

## 2023-05-03 DIAGNOSIS — C50.512 MALIGNANT NEOPLASM OF LOWER-OUTER QUADRANT OF LEFT BREAST OF FEMALE, ESTROGEN RECEPTOR POSITIVE (HCC): Primary | ICD-10-CM

## 2023-05-03 DIAGNOSIS — Z17.0 MALIGNANT NEOPLASM OF LOWER-OUTER QUADRANT OF LEFT BREAST OF FEMALE, ESTROGEN RECEPTOR POSITIVE (HCC): Primary | ICD-10-CM

## 2023-05-03 PROCEDURE — 77412 RADIATION TX DELIVERY LVL 3: CPT | Performed by: RADIOLOGY

## 2023-05-03 NOTE — PROGRESS NOTES
DEPARTMENT OF RADIATION ONCOLOGY   ON TREATMENT VISIT       5/3/2023      NAME:  Mar Banuelos    YOB: 1950    DIAGNOSIS: Pathological stage pT1c, PN 1 (SN, cM0, G3, ER/PA positive, HER2 negative, invasive ductal carcinoma of the lower outer quadrant of the left breast, SP partial mastectomy and sentinel node biopsy. SUBJECTIVE:   Severa Colander has now received 4256 cGy in 16 fractions directed to the left breast.      Past medical, surgical, social and family histories reviewed and updated as indicated.     PAIN: No    ALLERGIES:  Amlodipine, Cefdinir, E-mycin [erythromycin], Lisinopril, and Seasonal         Current Outpatient Medications   Medication Sig Dispense Refill    anastrozole (ARIMIDEX) 1 MG tablet Take 1 tablet by mouth daily 90 tablet 3    allopurinol (ZYLOPRIM) 100 MG tablet Take by mouth      hydrOXYzine HCl (ATARAX) 10 MG tablet Take by mouth      levocetirizine (XYZAL) 5 MG tablet Take by mouth      Magnesium 400 MG TABS Take 400 mg by mouth daily      sodium chloride 1 g tablet Take 1 g by mouth 3 times daily      NONFORMULARY in the morning, at noon, and at bedtime collogen and vitamin c      Multiple Vitamins-Minerals (THERAPEUTIC MULTIVITAMIN-MINERALS) tablet Take 1 tablet by mouth daily      metoprolol succinate (TOPROL XL) 25 MG extended release tablet Take 0.5 tablets by mouth daily (Patient taking differently: Take 12.5 mg by mouth at bedtime) 90 tablet 3    ferrous sulfate (IRON 325) 325 (65 Fe) MG tablet ferrous sulfate Ferrous Sulfate Active 325 MG Oral Daily After A Meal 100 October 23rd, 2020 10:46am 10-  [facility] ([id number])      apixaban (ELIQUIS) 5 MG TABS tablet Take 1 tablet by mouth 2 times daily 84 tablet 0    lovastatin (MEVACOR) 40 MG tablet Take 1 tablet by mouth nightly 90 tablet 1    PROAIR  (90 Base) MCG/ACT inhaler Inhale 1 puff into the lungs 4 times daily 3 Inhaler 1    Cholecalciferol (VITAMIN D3) 25 MCG (1000

## 2023-05-03 NOTE — PROGRESS NOTES
Genevieve Banuelos  5/3/2023  Wt Readings from Last 3 Encounters:   05/03/23 169 lb (76.7 kg)   04/26/23 169 lb (76.7 kg)   04/19/23 171 lb 8 oz (77.8 kg)     Body mass index is 30.91 kg/m². Treatment Area:Left breast    Patient was seen today for weekly visit. Comfort Alteration  KPS:70%  Fatigue: Mild to moderate    Nutritional Alteration  Anorexia: No   Nausea: No   Vomiting: No     Skin Alteration   Sensation:itching once in a while    Radiation Dermatitis:  slight discoloration    Mucous Membrane Alteration  Drainage: No  Lymphedema: No    Emotional  Coping: effective    Sexuality Alteration  na    Injury, potential bleeding or infection: na        Lab Results   Component Value Date    WBC 8.7 03/17/2023     03/17/2023         /75   Pulse 66   Temp 98.1 °F (36.7 °C) (Temporal)   Resp 18   Wt 169 lb (76.7 kg)   BMI 30.91 kg/m²   BP within normal range?  yes       Assessment/Plan:  Completed 16/20 fractions; 4256/5256 cGy    Jannet Cushing, RN

## 2023-05-04 ENCOUNTER — HOSPITAL ENCOUNTER (OUTPATIENT)
Dept: RADIATION ONCOLOGY | Age: 73
Discharge: HOME | End: 2023-05-04
Payer: MEDICARE

## 2023-05-04 PROCEDURE — 77412 RADIATION TX DELIVERY LVL 3: CPT | Performed by: RADIOLOGY

## 2023-05-05 ENCOUNTER — HOSPITAL ENCOUNTER (OUTPATIENT)
Dept: RADIATION ONCOLOGY | Age: 73
Discharge: HOME | End: 2023-05-05
Payer: MEDICARE

## 2023-05-05 PROCEDURE — 77412 RADIATION TX DELIVERY LVL 3: CPT | Performed by: RADIOLOGY

## 2023-05-08 ENCOUNTER — HOSPITAL ENCOUNTER (OUTPATIENT)
Dept: RADIATION ONCOLOGY | Age: 73
Discharge: HOME | End: 2023-05-08
Payer: MEDICARE

## 2023-05-08 PROCEDURE — 77412 RADIATION TX DELIVERY LVL 3: CPT | Performed by: RADIOLOGY

## 2023-05-09 ENCOUNTER — HOSPITAL ENCOUNTER (OUTPATIENT)
Dept: RADIATION ONCOLOGY | Age: 73
Discharge: HOME | End: 2023-05-09
Payer: MEDICARE

## 2023-05-09 ENCOUNTER — CLINICAL DOCUMENTATION (OUTPATIENT)
Dept: RADIATION ONCOLOGY | Age: 73
End: 2023-05-09

## 2023-05-09 PROCEDURE — 77336 RADIATION PHYSICS CONSULT: CPT | Performed by: RADIOLOGY

## 2023-05-09 PROCEDURE — 77427 RADIATION TX MANAGEMENT X5: CPT | Performed by: RADIOLOGY

## 2023-05-09 PROCEDURE — 77412 RADIATION TX DELIVERY LVL 3: CPT | Performed by: RADIOLOGY

## 2023-05-18 DIAGNOSIS — C18.9 MALIGNANT NEOPLASM OF COLON, UNSPECIFIED PART OF COLON (HCC): Primary | ICD-10-CM

## 2023-05-19 ENCOUNTER — HOSPITAL ENCOUNTER (OUTPATIENT)
Dept: INFUSION THERAPY | Age: 73
Discharge: HOME | End: 2023-05-19

## 2023-05-19 ENCOUNTER — OFFICE VISIT (OUTPATIENT)
Dept: ONCOLOGY | Age: 73
End: 2023-05-19
Payer: MEDICARE

## 2023-05-19 ENCOUNTER — TELEPHONE (OUTPATIENT)
Dept: CASE MANAGEMENT | Age: 73
End: 2023-05-19

## 2023-05-19 VITALS
DIASTOLIC BLOOD PRESSURE: 60 MMHG | BODY MASS INDEX: 31.28 KG/M2 | TEMPERATURE: 98.2 F | HEART RATE: 73 BPM | SYSTOLIC BLOOD PRESSURE: 130 MMHG | HEIGHT: 62 IN | WEIGHT: 170 LBS | OXYGEN SATURATION: 97 %

## 2023-05-19 DIAGNOSIS — M81.0 AGE-RELATED OSTEOPOROSIS WITHOUT CURRENT PATHOLOGICAL FRACTURE: Primary | ICD-10-CM

## 2023-05-19 PROCEDURE — 3078F DIAST BP <80 MM HG: CPT | Performed by: INTERNAL MEDICINE

## 2023-05-19 PROCEDURE — 99214 OFFICE O/P EST MOD 30 MIN: CPT | Performed by: INTERNAL MEDICINE

## 2023-05-19 PROCEDURE — 3074F SYST BP LT 130 MM HG: CPT | Performed by: INTERNAL MEDICINE

## 2023-05-19 PROCEDURE — 1123F ACP DISCUSS/DSCN MKR DOCD: CPT | Performed by: INTERNAL MEDICINE

## 2023-05-19 RX ORDER — ANASTROZOLE 1 MG/1
1 TABLET ORAL DAILY
Qty: 90 TABLET | Refills: 3 | Status: SHIPPED | OUTPATIENT
Start: 2023-05-19

## 2023-05-19 NOTE — PROGRESS NOTES
Patient provided with discharge instructions. All questions answered. Patient understands follow up plan of care.
twice daily if she continues to have epistaxis. her hemoglobin/hematocrit are normal.  Eliquis to be held 5 days prior to the breast surgery. The patient was found to have an area of parenchymal distortion in the left breast, which was consistent with mild fibrocystic changes, the patient was seen by Dr. Eber Anderson, the patient had a repeat left breast biopsy done on 11/21/2022, pathology was consistent with invasive ductal carcinoma, grade 3, with associated DCIS, ER +90%, KS +70%, HER2/stephane negative, 1+ by IHC. Ki-67 is 70%. The patient underwent on 2/1/2023 left localized breast lumpectomy with left axillary sentinel lymph node excisional biopsy, pathology was consistent with invasive carcinoma, ductal, grade 3, with associated DCIS, negative for extensive intraductal component, no lymphovascular invasion, margins are negative, 1 sentinel lymph node was removed, was positive for macrometastasis without extranodal extension, final pathologic stage pT1c, p(sn)N1aMx. I reviewed with the patient her pathology results, diagnosis, prognosis, and recommendations for treatment. Staging CT scans of the chest, abdomen, pelvis and a bone scan were done on 3/15/2023, were negative for metastatic disease. The patient had an Oncotype DX done, recurrence score is 28, distant recurrence risk at 9 years with adjuvant endocrine therapy alone is 22%, chemotherapy benefit for this group cannot be excluded, I discussed with the patient adjuvant chemotherapy with TC, she would not be able to tolerate dose dense AC-T, the side effects and the schedule of treatment were reviewed with the patient. She had decided against chemotherapy. We will notify radiation oncology to plan adjuvant RT. Discussed with the patient adjuvant endocrine therapy, Arimidex, the side effects were reviewed with the patient, including but not limited to hot flashes, mood changes, joint pain/stiffness/discomfort and osteoporosis.     The patient

## 2023-05-19 NOTE — TELEPHONE ENCOUNTER
Met with patient during her follow up appointment with Dr.El Geraldo gr. Patient completed her active treatment for her breast cancer. Patient appears in good spirits. Provided support and encouragement. Instructed patient in detail on her Cancer Treatment Summary and Survivorship Care Plan. Provided patient with written copy and additional copy sent to patient's PCP Gomez Oleary MD. Discussed NCCN recommendations for all cancer survivors of 150 minutes/week of moderate intensity exercise, achieving and maintaining a healthy weight, avoiding smoking or second hand smoke,and minimizing or avoidance of alcohol intake. Provided patient with [facility] [employer] for Cancer Survivors and Oncology Nutrition booklet. I also provided patient with [employer] information, [employer], [employer], Healthy Living af [facility] Directions of Radiation for Breast Cancer patients and my business card. Information also provided on Thriving and Surviving offered from our office. Advised patient that arrangements can be made through the office for follow up with a  or dietitian. Patient is scheduled for follow up appointment 3 months. After reviewing the Survivorship Treatment Summary and Care Plan, the patient verbalizes understanding of the recommendations for follow up. Instructed to call with any questions or concerns. Verbally agreed. Patient appreciative of visit and information. I will discharge patient from a navigation standpoint.  Mando PLATT,RN-OCN

## 2023-06-26 ENCOUNTER — HOSPITAL ENCOUNTER (OUTPATIENT)
Dept: RADIATION ONCOLOGY | Age: 73
Discharge: HOME | End: 2023-06-26

## 2023-06-26 VITALS
HEART RATE: 69 BPM | SYSTOLIC BLOOD PRESSURE: 122 MMHG | WEIGHT: 169.25 LBS | TEMPERATURE: 98.1 F | BODY MASS INDEX: 30.96 KG/M2 | DIASTOLIC BLOOD PRESSURE: 69 MMHG | RESPIRATION RATE: 20 BRPM

## 2023-06-26 DIAGNOSIS — C50.512 MALIGNANT NEOPLASM OF LOWER-OUTER QUADRANT OF LEFT BREAST OF FEMALE, ESTROGEN RECEPTOR POSITIVE (HCC): Primary | ICD-10-CM

## 2023-06-26 DIAGNOSIS — Z17.0 MALIGNANT NEOPLASM OF LOWER-OUTER QUADRANT OF LEFT BREAST OF FEMALE, ESTROGEN RECEPTOR POSITIVE (HCC): Primary | ICD-10-CM

## 2023-06-26 PROCEDURE — 99999 PR OFFICE/OUTPT VISIT,PROCEDURE ONLY: CPT | Performed by: NURSE PRACTITIONER

## 2023-08-05 NOTE — PROGRESS NOTES
Radiation Treatment Summary    Patient Name:  Laury Lea  1950,  68 y.o., female       Referring Physician: No referring provider defined for this encounter. PCP: Satinder Clifton MD       Diagnosis:Pathological stage pT1c, PN1 (SN), cM0, G3, ER/IL positive, HER2 negative, invasive ductal carcinoma of the lower outer quadrant of the left breast, SP partial mastectomy and sentinel node biopsy. Recent History: The patient underwent adjuvant irradiation of the left breast and the ipsilateral regional lymph nodes    Site Start TX Last [facility] ED Fractions Dose (cGy) Fx Dose (cGy) Technique   Left breast and ipsilateral regional lymph nodes 4/12/2023 5/3/2023 21 16 4256 266 3D conformal radiation therapy   Left lumpectomy boost 5/4/2023 5/9/2023 5 4 1000 250 3D conformal radiation therapy       Response/Tolerance: The patient tolerated the treatment well. Follow-up:  30-day in the office with Radiation Oncology      Thank you for the opportunity to participate in multidisciplinary management of this remarkable and pleasant patient.       Maryann Valdes MD    Department of Radiation Oncology  [facility]) [address]: [phone] (GPQ: [phone])  [address] Tamie Weaver) [address]: [phone] (LFS: [phone])  [facility]) [address]:  [phone] (ZOR:  [phone])

## 2023-08-17 DIAGNOSIS — Z12.31 VISIT FOR SCREENING MAMMOGRAM: Primary | ICD-10-CM

## 2023-08-17 ASSESSMENT — ENCOUNTER SYMPTOMS
COUGH: 0
RESPIRATORY NEGATIVE: 1
ANAL BLEEDING: 0
WHEEZING: 0
ABDOMINAL PAIN: 0
NAUSEA: 0
ABDOMINAL DISTENTION: 0
BACK PAIN: 0
CHEST TIGHTNESS: 0
SHORTNESS OF BREATH: 0
CHOKING: 0
VOMITING: 0
CONSTIPATION: 0
DIARRHEA: 0

## 2023-08-17 NOTE — PROGRESS NOTES
Subjective:      Patient ID: Brandi Marion is a 68 y.o. female. BRENDA Bennett is a 68 y.o. woman who presents for follow up of her left breast cancer. He has a past medical history of recurrent VTE with factor V Leiden homozygosity. Also history of stage I colon cancer, left bundle branch block, type II DM chronic CHF, hypothyroidism, iron deficiency anemia, CKD, chronic pain syndrome, asthma, and morbid obesity. .    Stage I Colon cancer post right hemicolectomy on 11/28/2020 by Dr. Author Jang at [facility] [place], was diagnosed with stage I colon cancer,  Afterwards the patient was peritoneal and was reexplored with findings of missed enterotomy which was repaired. After this second surgery experienced a cardiopulmonary arrest, ROSC was achieved and she was intubated. Patient was transferred from [facility] to the [facility] facility. On 11/27 she underwent exploratory laparotomy with small bowel resection plus loop ileostomy plus distal mucous fistula and excision of mesh. Afterwards patient developed an abdominal abscess that required IR drainage on 12/3. Patient underwent a tracheostomy on 12/8. Breast cancer risk factors include personal history of colon cancer, mom with lung and brain cancer at age 62, son with renal cell carcinoma age 43. Menarche age 15. Menopause age 36 she took hormonal therapy for greater than 10 years. G3, P2. First live birth age 21 and she did breast-feed. The left breast lesion was identified on screening mammogram at [facility].    09/06/2022 she underwent a left breast ultrasound-guided biopsy of a 1.1 cm mass at [facility]:  Pathology revealed mild fibrocystic changes. Results were felt to be discordant and surgical consult was recommended. 10/17/2022 [facility] review of outside slides with no evidence of malignancy. 10/31/2022 phone conversation with Dr. Curry Aguero: elected to proceed with a ultrasound probable biopsy.      11/22/2022 she underwent a left

## 2023-08-25 ENCOUNTER — HOSPITAL ENCOUNTER (OUTPATIENT)
Dept: INFUSION THERAPY | Age: 73
Discharge: HOME | End: 2023-08-25

## 2023-09-11 ENCOUNTER — TELEPHONE (OUTPATIENT)
Dept: BREAST CENTER | Age: 73
End: 2023-09-11

## 2023-09-11 NOTE — TELEPHONE ENCOUNTER
Patient was scheduled to have screening mammogram and office visit in the [place] office. Patient did not show. Called and left detailed message with call back number to reschedule.     Electronically signed by Funmilayo Lewis RN on 9/11/23 at 12:03 PM EDT

## 2023-09-15 ENCOUNTER — OFFICE VISIT (OUTPATIENT)
Dept: ONCOLOGY | Age: 73
End: 2023-09-15

## 2023-09-15 ENCOUNTER — HOSPITAL ENCOUNTER (OUTPATIENT)
Dept: INFUSION THERAPY | Age: 73
Discharge: HOME | End: 2023-09-15
Payer: MEDICARE

## 2023-09-15 ENCOUNTER — TELEPHONE (OUTPATIENT)
Dept: INFUSION THERAPY | Age: 73
End: 2023-09-15

## 2023-09-15 VITALS
BODY MASS INDEX: 32.76 KG/M2 | OXYGEN SATURATION: 99 % | HEIGHT: 62 IN | DIASTOLIC BLOOD PRESSURE: 70 MMHG | WEIGHT: 178 LBS | TEMPERATURE: 97.3 F | SYSTOLIC BLOOD PRESSURE: 164 MMHG | HEART RATE: 68 BPM

## 2023-09-15 DIAGNOSIS — Z12.39 BREAST SCREENING: Primary | ICD-10-CM

## 2023-09-15 DIAGNOSIS — C18.9 MALIGNANT NEOPLASM OF COLON, UNSPECIFIED PART OF COLON (HCC): ICD-10-CM

## 2023-09-15 DIAGNOSIS — Z12.31 ENCOUNTER FOR SCREENING MAMMOGRAM FOR MALIGNANT NEOPLASM OF BREAST: ICD-10-CM

## 2023-09-15 LAB
ALBUMIN SERPL-MCNC: 4.4 G/DL (ref 3.5–5.2)
ALP SERPL-CCNC: 93 U/L (ref 35–104)
ALT SERPL-CCNC: 9 U/L (ref 0–32)
ANION GAP SERPL CALCULATED.3IONS-SCNC: 12 MMOL/L (ref 7–16)
AST SERPL-CCNC: 21 U/L (ref 0–31)
BASOPHILS # BLD: 0.05 K/UL (ref 0–0.2)
BASOPHILS NFR BLD: 1 % (ref 0–2)
BILIRUB SERPL-MCNC: 0.7 MG/DL (ref 0–1.2)
BUN SERPL-MCNC: 32 MG/DL (ref 6–23)
CALCIUM SERPL-MCNC: 9.7 MG/DL (ref 8.6–10.2)
CHLORIDE SERPL-SCNC: 102 MMOL/L (ref 98–107)
CO2 SERPL-SCNC: 25 MMOL/L (ref 22–29)
CREAT SERPL-MCNC: 1.5 MG/DL (ref 0.5–1)
EOSINOPHIL # BLD: 0.19 K/UL (ref 0.05–0.5)
EOSINOPHILS RELATIVE PERCENT: 3 % (ref 0–6)
ERYTHROCYTE [DISTWIDTH] IN BLOOD BY AUTOMATED COUNT: 12.7 % (ref 11.5–15)
GFR SERPL CREATININE-BSD FRML MDRD: 38 ML/MIN/1.73M2
GLUCOSE SERPL-MCNC: 90 MG/DL (ref 74–99)
HCT VFR BLD AUTO: 42.7 % (ref 34–48)
HGB BLD-MCNC: 14.2 G/DL (ref 11.5–15.5)
IMM GRANULOCYTES # BLD AUTO: 0.03 K/UL (ref 0–0.58)
IMM GRANULOCYTES NFR BLD: 1 % (ref 0–5)
LYMPHOCYTES NFR BLD: 1.34 K/UL (ref 1.5–4)
LYMPHOCYTES RELATIVE PERCENT: 22 % (ref 20–42)
MCH RBC QN AUTO: 31.6 PG (ref 26–35)
MCHC RBC AUTO-ENTMCNC: 33.3 G/DL (ref 32–34.5)
MCV RBC AUTO: 94.9 FL (ref 80–99.9)
MONOCYTES NFR BLD: 0.61 K/UL (ref 0.1–0.95)
MONOCYTES NFR BLD: 10 % (ref 2–12)
NEUTROPHILS NFR BLD: 64 % (ref 43–80)
NEUTS SEG NFR BLD: 3.85 K/UL (ref 1.8–7.3)
PLATELET # BLD AUTO: 134 K/UL (ref 130–450)
PMV BLD AUTO: 9.1 FL (ref 7–12)
POTASSIUM SERPL-SCNC: 4.5 MMOL/L (ref 3.5–5)
PROT SERPL-MCNC: 8.3 G/DL (ref 6.4–8.3)
RBC # BLD AUTO: 4.5 M/UL (ref 3.5–5.5)
SODIUM SERPL-SCNC: 139 MMOL/L (ref 132–146)
WBC OTHER # BLD: 6.1 K/UL (ref 4.5–11.5)

## 2023-09-15 PROCEDURE — 85025 COMPLETE CBC W/AUTO DIFF WBC: CPT

## 2023-09-15 PROCEDURE — 80053 COMPREHEN METABOLIC PANEL: CPT

## 2023-09-15 PROCEDURE — 36415 COLL VENOUS BLD VENIPUNCTURE: CPT

## 2023-09-15 NOTE — TELEPHONE ENCOUNTER
Spoke to patient regarding Dr. Quique Tamayo recommendation for patient to increase her fluid intake due to her kidney levels. Patient states she drinks at least 2 liters of diet Pepsi a day. Encouraged patient to drink water as much as possible in place of the Pepsi. Patient verbalized understanding.

## 2023-09-15 NOTE — PROGRESS NOTES
[facility] MED ONCOLOGY  [address] [facility] [address]  Dept: [address] [place] [address] Avenue: [phone]  Attending Progress Note      Reason for Visit:   1. Recurrent VTE. 2. Factor V Leiden Homozygosity. 3. Colon cancer. Referring Physician:  Marina Whittaker NP    PCP:  Cherry Hernández MD    History of Present Illness: The patient is a pleasant 57-year-old lady with a PMH significant for obesity, HTN, and hyperlipidemia, who was diagnosed with PE and bilateral DVTs that were provoked following ventral hernia repair in March 2017, she had her surgery done in [facility], she was anticoagulated with Coumadin, she was complaining of left knee pain, had a venous US done on 3/1/2019, revealing an acute DVT in the left posterior tibial vein, chronic appearing thromus from proximal femoral to peroneal veins. She was started on Lovenox 100 mg subq bid, INR was therapeutic, 2.2. She does not like the injections, prefers to be on an oral anticoagulant, AC was changed to Eliquis. FH is negative for VTE. The patient was last seen in the office in November 2020, the patient was diagnosed with colon cancer, she underwent a right hemicolectomy on 11/28/2020 by Dr. Carmen Cerrato at [facility], was diagnosed with stage I colon cancer,  Afterwards the patient was peritoneal and was reexplored with findings of missed enterotomy which was repaired. After this second surgery experienced a cardiopulmonary arrest, ROSC was achieved and she was intubated. Patient was transferred from [facility] to the [facility] facility. On 11/27 she underwent exploratory laparotomy with small bowel resection plus loop ileostomy plus distal mucous fistula and excision of mesh. Afterwards patient developed an abdominal abscess that required IR drainage on 12/3. Patient underwent a tracheostomy on 12/8.      The patient was found to

## 2023-10-02 ENCOUNTER — OFFICE VISIT (OUTPATIENT)
Dept: BREAST CENTER | Age: 73
End: 2023-10-02
Payer: MEDICARE

## 2023-10-02 ENCOUNTER — TELEPHONE (OUTPATIENT)
Dept: BREAST CENTER | Age: 73
End: 2023-10-02

## 2023-10-02 VITALS
RESPIRATION RATE: 18 BRPM | DIASTOLIC BLOOD PRESSURE: 80 MMHG | WEIGHT: 170 LBS | SYSTOLIC BLOOD PRESSURE: 142 MMHG | HEIGHT: 62 IN | BODY MASS INDEX: 31.28 KG/M2 | TEMPERATURE: 97 F | OXYGEN SATURATION: 97 % | HEART RATE: 80 BPM

## 2023-10-02 DIAGNOSIS — C50.912 MALIGNANT NEOPLASM OF LEFT BREAST IN FEMALE, ESTROGEN RECEPTOR POSITIVE, UNSPECIFIED SITE OF BREAST (HCC): ICD-10-CM

## 2023-10-02 DIAGNOSIS — Z17.0 MALIGNANT NEOPLASM OF LEFT BREAST IN FEMALE, ESTROGEN RECEPTOR POSITIVE, UNSPECIFIED SITE OF BREAST (HCC): ICD-10-CM

## 2023-10-02 DIAGNOSIS — I89.0 LYMPHEDEMA: Primary | ICD-10-CM

## 2023-10-02 PROCEDURE — 99214 OFFICE O/P EST MOD 30 MIN: CPT | Performed by: NURSE PRACTITIONER

## 2023-10-02 PROCEDURE — 3077F SYST BP >= 140 MM HG: CPT | Performed by: NURSE PRACTITIONER

## 2023-10-02 PROCEDURE — 3079F DIAST BP 80-89 MM HG: CPT | Performed by: NURSE PRACTITIONER

## 2023-10-02 PROCEDURE — 1123F ACP DISCUSS/DSCN MKR DOCD: CPT | Performed by: NURSE PRACTITIONER

## 2023-10-02 RX ORDER — LANOLIN ALCOHOL/MO/W.PET/CERES
1000 CREAM (GRAM) TOPICAL DAILY
COMMUNITY

## 2023-10-02 RX ORDER — MONTELUKAST SODIUM 10 MG/1
10 TABLET ORAL DAILY
COMMUNITY
Start: 2023-07-12

## 2023-10-02 NOTE — TELEPHONE ENCOUNTER
Patient left without having her blood pressure rechecked today. Patient asymptomatic. She will continue watching it closely at home and call her PCP if it remains elevated. Patient has been scheduled for a 6 month follow up as recommended. She is aware she will receive a call from OT to schedule an appointment.

## 2023-10-04 ENCOUNTER — TELEPHONE (OUTPATIENT)
Dept: ADMINISTRATIVE | Age: 73
End: 2023-10-04

## 2023-10-04 NOTE — TELEPHONE ENCOUNTER
A referral to [facility] was received today, however the referral appears to be for occupational therapy. Please review and send new referral to occupational therapy if needed. Pt is already scheduled with [facility] in November.

## 2023-10-16 ENCOUNTER — HOSPITAL ENCOUNTER (OUTPATIENT)
Dept: OCCUPATIONAL THERAPY | Age: 73
Setting detail: THERAPIES SERIES
Discharge: HOME | End: 2023-10-16
Payer: MEDICARE

## 2023-10-16 PROCEDURE — 97165 OT EVAL LOW COMPLEX 30 MIN: CPT | Performed by: OCCUPATIONAL THERAPIST

## 2023-10-16 NOTE — PROGRESS NOTES
Occupational Therapy      OCCUPATIONAL THERAPY INITIAL [address]  [address] [place] [address]   Phone: [phone]  Fax: [phone]     Date:  10/16/2023  Initial Evaluation Date: 2023   Evaluating Therapist: KAELA Cook/ABRAHAM, Davey Spangler    Patient Name:  Chiquita Wayne    :  1950    Restrictions/Precautions:   fall risk  Diagnosis:  estrogen receptor positive, unspecified site of breast (C50.912, Z17.0)       Date of Surgery/Injury: n/a    Insurance/Certification information:  [facility] Medicare     [id number]  Plan of care signed (Y/N): N  Visit# / total visits: Evaluation    Referring Practitioner:  ERNESTINE Mccarthy CNP            NPI:  [id number]  Specific Practitioner Orders: Evaluation and Treatment    Assessment of current deficits   []Pain  []Skin Integrity   [x]Lymphedema   []Functional transfers/mobility   []ADLs   []Strength    []Cognition  []IADLs   []Safety Awareness   []  Motor Endurance    []Fine Motor Coordination   []Balance   []Vision/perception  []Sensation []Gross Motor Coordination  []ROM     OT PLAN OF CARE   OT POC based on physician orders, patient diagnosis and results of clinical assessment    Frequency/Duration:  1-2x per week for 12 treatment sessions from 2023 through [facility].   Specific OT Treatment to include:     Plan of Care:     [x]97140-Manual Lymph Drainage and Combined Decongestive Therapy  [x]25628- Skilled Multilayer Short Stretch Compression Bandaging/ Therapeutic Exercise  [x]Skin Care Education [x]HEP including Self MLD Education and/or Self Bandaging  [x]Education for Lymphedema Risks/Precautions     [x] Caregiver Education   []other:      Patient Specific Goal: to not have lymphedema      STG: 3 weeks   Patient will demonstrate knowledge and understanding for lymphedema precautions, skin care and self management to decrease progression of lymphedema and risk of

## 2023-10-19 ENCOUNTER — HOSPITAL ENCOUNTER (OUTPATIENT)
Dept: OCCUPATIONAL THERAPY | Age: 73
Setting detail: THERAPIES SERIES
Discharge: HOME | End: 2023-10-19
Payer: MEDICARE

## 2023-10-19 PROCEDURE — 97140 MANUAL THERAPY 1/> REGIONS: CPT | Performed by: OCCUPATIONAL THERAPIST

## 2023-10-19 PROCEDURE — 97530 THERAPEUTIC ACTIVITIES: CPT | Performed by: OCCUPATIONAL THERAPIST

## 2023-10-19 NOTE — PROGRESS NOTES
lymphedema and risk of infection. Therapist initiated patient education regarding lymphedema precautions and skin care / self management. Patient able to verbalize understanding. Patient progressing toward goal.   2.  Patient will present with decreased limb volume in the involved extremity from modferate to minimal for improved functional mobility. Therapist initiated patient education regarding manual lymphatic drainage massage. Therapist provided written hand out for self manual lymphatic drainage massage sequence for chest involvement. Therapist performed manual lymphatic drainage massage for chest involvement. Therapist described each step as it was performed. Patient tolerated well and stated area felt lighter once completed. Patient progressing toward goal.    3.  Patient will demonstrate compliance with compression therapy for independent management of lymphedema. LT weeks   Patient will be independent with self management of lymphedema including understanding garment wear schedule, self compression bandaging, HEP and self care. 2.  Patient will be fitted for appropriate compression garments for long term management of lymphedema. 3.   Patient will present with decreased limb volume in the involved extremity from Minimal to trace  for improved functional mobility.         Restrictions/Precautions:  [] No blood pressure/blood draw in: [] Left Upper Extremity     [] Right Upper Extremity        [x] Fall Risk       Intervention:   []Other    Pain:  [] No    [] Yes  Location:  Pain Rating (0-10 pain scale):  Pain Description:  Interruption of Treatment [] Yes  [] No    Came to Clinic:  [] Bandaged    []Unbandaged    [] With Stocking     [] With Sleeve     [] Kinesiotaped    [] [place]    [] With Alternative Compression:    Skin Integrity:  [] Normal [] Dry  []Rough []Moist []Rash  Location of problem area/s:  [] Wound: Location/Description   [] Fibrosis: Location/Description  []

## 2023-10-23 ENCOUNTER — HOSPITAL ENCOUNTER (OUTPATIENT)
Dept: OCCUPATIONAL THERAPY | Age: 73
Setting detail: THERAPIES SERIES
Discharge: HOME | End: 2023-10-23
Payer: MEDICARE

## 2023-10-23 PROCEDURE — 97530 THERAPEUTIC ACTIVITIES: CPT | Performed by: OCCUPATIONAL THERAPIST

## 2023-10-23 NOTE — PROGRESS NOTES
lymphedema and risk of infection. Therapist continued patient education regarding lymphedema precautions and skin care / self management. Patient able to verbalize understanding. Patient progressing toward goal.   2.  Patient will present with decreased limb volume in the involved extremity from modferate to minimal for improved functional mobility. Therapist initiated patient education regarding manual lymphatic drainage massage. Therapist reviewed written hand out for self manual lymphatic drainage massage sequence for chest involvement. Therapist performed manual lymphatic drainage massage for chest chest involvement. Patient verbalized difficulty with hand out use at home. Therapist and patient discussed issues and adjusted entire hand out for patient. Patient stated new version easier for her to understand. Therapist reviewed new hand out with patient. Therapist had patient performed sequence seated with use of hand out. Therapist provided verbal cues and hand over hand assist as needed. Patient progressing toward goal.    3.  Patient will demonstrate compliance with compression therapy for independent management of lymphedema. Therapist continued patient education regarding compression garments for chest swelling. Therapist submitted information regarding compression bra ordering. Patient has not  heard back from DME. Therapist to follow up with DME. Patient goal partially met. LT weeks   Patient will be independent with self management of lymphedema including understanding garment wear schedule, self compression bandaging, HEP and self care. 2.  Patient will be fitted for appropriate compression garments for long term management of lymphedema. 3.   Patient will present with decreased limb volume in the involved extremity from Minimal to trace  for improved functional mobility.         Restrictions/Precautions:  [] No blood pressure/blood draw in: [] Left Upper Extremity     []

## 2023-10-26 ENCOUNTER — HOSPITAL ENCOUNTER (OUTPATIENT)
Dept: OCCUPATIONAL THERAPY | Age: 73
Setting detail: THERAPIES SERIES
Discharge: HOME | End: 2023-10-26
Payer: MEDICARE

## 2023-10-26 PROCEDURE — 97530 THERAPEUTIC ACTIVITIES: CPT | Performed by: OCCUPATIONAL THERAPIST

## 2023-10-26 NOTE — PROGRESS NOTES
lymphedema and risk of infection. Therapist continued patient education regarding lymphedema precautions and skin care / self management. Patient able to verbalize and demonstrate understanding. Patient goal met. 2.  Patient will present with decreased limb volume in the involved extremity from modferate to minimal for improved functional mobility. Therapist continued patient education regarding manual lymphatic drainage massage. Therapist reviewed new written hand out for self manual lymphatic drainage massage sequence for chest involvement. Therapist required patient to read each step and demonstrate how she performs step. Therapist provided verbal cues and hand over hand assist for technique. Patient spouse attended treatment session and video recorded patient performing sequence. Patient was fully clothed and therapist reviewed each step of sequence. Patient will utilize sequence video at home. Patient progressing toward goal.    3.  Patient will demonstrate compliance with compression therapy for independent management of lymphedema. Therapist continued patient education regarding compression garments for chest swelling. Patient goal partially met. LT weeks   Patient will be independent with self management of lymphedema including understanding garment wear schedule, self compression bandaging, HEP and self care. 2.  Patient will be fitted for appropriate compression garments for long term management of lymphedema. 3.   Patient will present with decreased limb volume in the involved extremity from Minimal to trace  for improved functional mobility.         Restrictions/Precautions:  [] No blood pressure/blood draw in: [] Left Upper Extremity     [] Right Upper Extremity        [x] Fall Risk       Intervention:   []Other    Pain:  [] No    [] Yes  Location:  Pain Rating (0-10 pain scale):  Pain Description:  Interruption of Treatment [] Yes  [] No    Came to Clinic:  []

## 2023-11-01 ENCOUNTER — OFFICE VISIT (OUTPATIENT)
Dept: ENDOCRINOLOGY | Age: 73
End: 2023-11-01
Payer: MEDICARE

## 2023-11-01 VITALS
BODY MASS INDEX: 33.13 KG/M2 | HEART RATE: 65 BPM | DIASTOLIC BLOOD PRESSURE: 81 MMHG | SYSTOLIC BLOOD PRESSURE: 163 MMHG | HEIGHT: 62 IN | WEIGHT: 180 LBS

## 2023-11-01 DIAGNOSIS — E55.9 VITAMIN D DEFICIENCY: ICD-10-CM

## 2023-11-01 DIAGNOSIS — M81.0 OSTEOPOROSIS, UNSPECIFIED OSTEOPOROSIS TYPE, UNSPECIFIED PATHOLOGICAL FRACTURE PRESENCE: Primary | ICD-10-CM

## 2023-11-01 PROCEDURE — 3078F DIAST BP <80 MM HG: CPT | Performed by: INTERNAL MEDICINE

## 2023-11-01 PROCEDURE — 1123F ACP DISCUSS/DSCN MKR DOCD: CPT | Performed by: INTERNAL MEDICINE

## 2023-11-01 PROCEDURE — 99204 OFFICE O/P NEW MOD 45 MIN: CPT | Performed by: INTERNAL MEDICINE

## 2023-11-01 PROCEDURE — 3074F SYST BP LT 130 MM HG: CPT | Performed by: INTERNAL MEDICINE

## 2023-11-01 RX ORDER — METOPROLOL SUCCINATE 25 MG/1
TABLET, EXTENDED RELEASE ORAL
COMMUNITY
Start: 2023-10-20

## 2023-11-01 NOTE — PROGRESS NOTES
[facility] Department of Endocrinology Diabetes and Metabolism   [facility] [address]   Phone: [phone]  Fax: [phone]    Date of Service: 11/1/2023    Primary Care Physician: Ermelinda Briggs MD.  Referring physician: Allen Florence   Provider: Marianne Boogie MD        Reason for the visit:  Osteoporosis    History of present illness: The history is provided by the patient. No  was used. Accuracy of the patient data is excellent  Irven Brunner is a very pleasant 68 y.o. female seen today for management of osteoporosis    The patient was diagnosed with osteoporosis long time ago. She used to be on Fosamax in the past.  She tried Fosamax for nearly 5 years and this was stopped about 4 years ago. The patient denies any history of fragility fracture but reports personal history of kidney stones long time ago.     The patient denies any history of hypercalcemia or parathyroid disease  She lost few inches over the past 5 years since    DEXA scan on April 5, 2023 for the lumbar spine T score -2.5, total hip T score -2.8, femoral neck T score -3.2    PAST MEDICAL HISTORY   Past Medical History:   Diagnosis Date    Breast cancer ([facility])     Colon cancer ([facility]) 11/01/2020    Hyperlipidemia     Hypertension     LBBB (left bundle branch block)     Malignant neoplasm of left breast ([address]) 11/21/2022    Left breast IDC, Gr 3.    Obesity        PAST SURGICAL HISTORY   Past Surgical History:   Procedure Laterality Date    APPENDECTOMY      BREAST BIOPSY Left 02/01/2023    left blue dye injection, left localized breast lumpectomy, left axillary sentinel lymph node excision, performed by Patricia Bhat MD at [address] [facility] 11/2020    COLOSTOMY  01/01/2007    due to divertiulosis since than it was reversed    HERNIA REPAIR      HYSTERECTOMY, TOTAL ABDOMINAL (CERVIX REMOVED)  01/01/1990     BREAST BIOPSY W LOC

## 2023-11-06 ENCOUNTER — HOSPITAL ENCOUNTER (OUTPATIENT)
Dept: OCCUPATIONAL THERAPY | Age: 73
Setting detail: THERAPIES SERIES
Discharge: HOME | End: 2023-11-06
Payer: MEDICARE

## 2023-11-06 PROCEDURE — 97530 THERAPEUTIC ACTIVITIES: CPT | Performed by: OCCUPATIONAL THERAPIST

## 2023-11-06 PROCEDURE — 97140 MANUAL THERAPY 1/> REGIONS: CPT | Performed by: OCCUPATIONAL THERAPIST

## 2023-11-06 NOTE — PROGRESS NOTES
70576 Manual 30 2   11107 Therapeutic Ex     44341 Therapeutic Activity 30 2   50752 ADL/COMP Tech Train     [id number] Neuromuscular Re-Ed     26860 OrthoManagementTraining     [id number] Paraffin     [id number] Electrical Stim - Attended     [id number] Iontophoresis     04351 Ultrasound      Other     Total  60 4       Plan: Continue to address:  [x]Bandaging  [x] Self Bandaging/Family Assist  [x]MLD  [x]Self Massage  [x]Exercise  [x]Education  []Obtain referral for garments  []Medical Hold  []Discharge to [facility], OTR/L, [employer]

## 2023-11-09 ENCOUNTER — HOSPITAL ENCOUNTER (OUTPATIENT)
Dept: OCCUPATIONAL THERAPY | Age: 73
Setting detail: THERAPIES SERIES
Discharge: HOME | End: 2023-11-09
Payer: MEDICARE

## 2023-11-09 PROCEDURE — 97140 MANUAL THERAPY 1/> REGIONS: CPT | Performed by: OCCUPATIONAL THERAPIST

## 2023-11-09 PROCEDURE — 97530 THERAPEUTIC ACTIVITIES: CPT | Performed by: OCCUPATIONAL THERAPIST

## 2023-11-09 NOTE — PROGRESS NOTES
Total  55 4       Plan: Continue to address:  [x]Bandaging  [x] Self Bandaging/Family Assist  [x]MLD  [x]Self Massage  [x]Exercise  [x]Education  []Obtain referral for garments  []Medical Hold  []Discharge to [facility], OTR/L, [employer]

## 2023-11-14 DIAGNOSIS — M81.0 SENILE OSTEOPOROSIS: ICD-10-CM

## 2023-11-16 ENCOUNTER — HOSPITAL ENCOUNTER (OUTPATIENT)
Dept: OCCUPATIONAL THERAPY | Age: 73
Setting detail: THERAPIES SERIES
Discharge: HOME | End: 2023-11-16
Payer: MEDICARE

## 2023-11-16 PROCEDURE — 97530 THERAPEUTIC ACTIVITIES: CPT | Performed by: OCCUPATIONAL THERAPIST

## 2023-11-16 PROCEDURE — 97140 MANUAL THERAPY 1/> REGIONS: CPT | Performed by: OCCUPATIONAL THERAPIST

## 2023-11-16 NOTE — PROGRESS NOTES
Occupational Therapy        OCCUPATIONAL THERAPY PROGRESS NOTE  [facility] [place] [address]  [address] [place] [address].[facility]  [address]              Phone: [phone]             Fax: [phone]      Date:  2023  Initial Evaluation Date: 2023   Evaluating Therapist: Alison Bustamante OTR/L, Santana Block    Patient Name:  Santos Sidhu    :  1950    Restrictions/Precautions:  fall risk  Diagnosis:  estrogen receptor positive, unspecified site of breast (C50.912, Z17.0)          Date of Surgery/Injury: n/a    Insurance/Certification information:  [facility] Medicare     [id number]  Plan of care signed (Y/N): Yes  Visit# / total visits: Evaluation +Visit #6    Referring Practitioner:  ERNESTINE Pierre CNP            NPI:  [id number]  Specific Practitioner Orders: Evaluation and Treatment    Assessment of current deficits   []Pain  []Skin Integrity   [x]Lymphedema   []Functional transfers/mobility   []ADLs   []Strength    []Cognition  []IADLs   []Safety Awareness   []  Motor Endurance    []Fine Motor Coordination   []Balance   []Vision/perception  []Sensation []Gross Motor Coordination  []ROM     OT PLAN OF CARE   OT POC based on physician orders, patient diagnosis and results of clinical assessment    Frequency/Duration:  1-2x per week for 12 treatment sessions from 2023 through  [facility].   Specific OT Treatment to include:     Plan of Care:     [x]97140-Manual Lymph Drainage and Combined Decongestive Therapy  [x]17848- Skilled Multilayer Short Stretch Compression Bandaging/ Therapeutic Exercise  [x]Skin Care Education [x]HEP including Self MLD Education and/or Self Bandaging  [x]Education for Lymphedema Risks/Precautions     [x] Caregiver Education   []other:      Patient Specific Goal: to not have lymphedema      STG: 3 weeks   Patient will demonstrate knowledge and understanding for lymphedema precautions, skin care and self management to decrease progression of

## 2023-11-30 ENCOUNTER — HOSPITAL ENCOUNTER (OUTPATIENT)
Dept: OCCUPATIONAL THERAPY | Age: 73
Setting detail: THERAPIES SERIES
Discharge: HOME | End: 2023-11-30
Payer: MEDICARE

## 2023-12-01 ENCOUNTER — TELEPHONE (OUTPATIENT)
Dept: OCCUPATIONAL THERAPY | Age: 73
End: 2023-12-01

## 2023-12-01 NOTE — TELEPHONE ENCOUNTER
Therapist received return call from DME regarding patient compression garment fitting. Messages have been left for patient. Unsure if DME has correct number. Therapist left message for patient to call DME at [phone] Ext. [phone].

## 2023-12-04 ENCOUNTER — APPOINTMENT (OUTPATIENT)
Dept: OCCUPATIONAL THERAPY | Age: 73
End: 2023-12-04
Payer: MEDICARE

## 2024-01-04 ENCOUNTER — HOSPITAL ENCOUNTER (OUTPATIENT)
Dept: OCCUPATIONAL THERAPY | Age: 74
Setting detail: THERAPIES SERIES
Discharge: HOME | End: 2024-01-04
Payer: MEDICARE

## 2024-01-04 PROCEDURE — 97535 SELF CARE MNGMENT TRAINING: CPT | Performed by: OCCUPATIONAL THERAPIST

## 2024-01-04 PROCEDURE — 97140 MANUAL THERAPY 1/> REGIONS: CPT | Performed by: OCCUPATIONAL THERAPIST

## 2024-01-04 NOTE — PROGRESS NOTES
Occupational Therapy  OCCUPATIONAL THERAPY UPDATE/PROGRESS NOTE  [facility]  [address] [facility]  [address]              Phone: [phone]             Fax: [phone]      Date:  2024  Initial Evaluation Date: 2023     Evaluating Therapist: KAELA Ramon/L, CLT-GASPER    Patient Name:  Mirna Banuelos    :  1950    Restrictions/Precautions:  fall risk  Diagnosis:  estrogen receptor positive, unspecified site of breast (C50.912, Z17.0)         Date of Surgery/Injury: n/a    Insurance/Certification information:  [facility] Medicare     [id number]   Plan of care signed (Y/N): Yes  Visit#:  7   Total Visits to date:  7   Cancels/No-shows to date: 0  Last seen by therapist:  2023  Patient reaction to treatment:  patient making steady gains toward therapy and working toward increased consistent management.  Patient currently utilizing compression bra daily and elevation.  Patient having issue regarding performance of lymphedema massage.  Patient deciding on moving forward for lymphedema pump and will discuss with therapist once decided.  Patient further working to follow a low sodium eating lifestyle and increasing her activity level.  Garment / DME recommended:  lymphedema pump    Referring Practitioner:  ERNESTINE Roche CNP   Specific Practitioner Orders: Evaluation and Treatment    Assessment of current ERNESTINE Rodriguez CNP ficits   []Pain  []Skin Integrity   [x]Lymphedema   []Functional transfers/mobility   []ADLs   []Strength    []Cognition  []IADLs   []Safety Awareness   []  Motor Endurance    []Fine Motor Coordination   []Balance   []Vision/perception  []Sensation []Gross Motor Coordination  []ROM     OT PLAN OF CARE   OT POC based on physician orders, patient diagnosis and results of clinical assessment    Frequency/Duration:  1-2x per week for 12 treatment sessions from 2024 through 2024  Specific OT

## 2024-01-30 ENCOUNTER — HOSPITAL ENCOUNTER (OUTPATIENT)
Dept: INFUSION THERAPY | Age: 74
Discharge: HOME | End: 2024-01-30

## 2024-03-20 NOTE — PROGRESS NOTES
Subjective:    This note was copied forward from the last encounter.  Essential components for this patient record were reviewed and verified on this visit including:  recent hospitalizations, recent imaging, PMH, PSH, FH, SOC HX, Allergies, and Medications were reviewed and updated as appropriate.  In addition, the assessment and plan were copied from prior office note and updated accordingly.     Patient ID: Mirna Banuelos is a 73 y.o. female.    BRENDA Isbell is a 73 y.o. woman who presents for follow up of her left breast cancer.  He has a past medical history of recurrent VTE with factor V Leiden homozygosity.  Also history of stage I colon cancer, left bundle branch block, type II DM chronic CHF, hypothyroidism, iron deficiency anemia, CKD, chronic pain syndrome, asthma, and morbid obesity..    Stage I Colon cancer post right hemicolectomy on 11/28/2020 by Dr. Hayden at [facility], was diagnosed with stage I colon cancer,  Afterwards the patient was peritoneal and was reexplored with findings of missed enterotomy which was repaired. After this second surgery experienced a cardiopulmonary arrest, ROSC was achieved and she was intubated.  Patient was transferred from [place] to the [facility]. On 11/27 she underwent exploratory laparotomy with small bowel resection plus loop ileostomy plus distal mucous fistula and excision of mesh. Afterwards patient developed an abdominal abscess that required IR drainage on 12/3. Patient underwent a tracheostomy on 12/8.    Breast cancer risk factors include personal history of colon cancer, mom with lung and brain cancer at age 58, son with renal cell carcinoma age 42.  Menarche age 13.  Menopause age 40 she took hormonal therapy for greater than 10 years.  G3, P2.  First live birth age 20 and she did breast-feed.    The left breast lesion was identified on screening mammogram at [facility].    09/06/2022 she underwent a left breast ultrasound-guided biopsy of a 1.1

## 2024-03-22 ENCOUNTER — HOSPITAL ENCOUNTER (OUTPATIENT)
Dept: INFUSION THERAPY | Age: 74
Discharge: HOME | End: 2024-03-22

## 2024-04-15 ENCOUNTER — OFFICE VISIT (OUTPATIENT)
Dept: BREAST CENTER | Age: 74
End: 2024-04-15
Payer: MEDICARE

## 2024-04-15 VITALS
TEMPERATURE: 99.2 F | DIASTOLIC BLOOD PRESSURE: 72 MMHG | OXYGEN SATURATION: 99 % | WEIGHT: 180 LBS | HEIGHT: 62 IN | RESPIRATION RATE: 20 BRPM | BODY MASS INDEX: 33.13 KG/M2 | HEART RATE: 84 BPM | SYSTOLIC BLOOD PRESSURE: 140 MMHG

## 2024-04-15 DIAGNOSIS — Z17.0 MALIGNANT NEOPLASM OF LEFT BREAST IN FEMALE, ESTROGEN RECEPTOR POSITIVE, UNSPECIFIED SITE OF BREAST (HCC): ICD-10-CM

## 2024-04-15 DIAGNOSIS — C50.912 MALIGNANT NEOPLASM OF LEFT BREAST IN FEMALE, ESTROGEN RECEPTOR POSITIVE, UNSPECIFIED SITE OF BREAST (HCC): ICD-10-CM

## 2024-04-15 DIAGNOSIS — I89.0 LYMPHEDEMA: ICD-10-CM

## 2024-04-15 DIAGNOSIS — Z12.31 VISIT FOR SCREENING MAMMOGRAM: Primary | ICD-10-CM

## 2024-04-15 PROCEDURE — 1123F ACP DISCUSS/DSCN MKR DOCD: CPT | Performed by: NURSE PRACTITIONER

## 2024-04-15 PROCEDURE — 3077F SYST BP >= 140 MM HG: CPT | Performed by: NURSE PRACTITIONER

## 2024-04-15 PROCEDURE — 3078F DIAST BP <80 MM HG: CPT | Performed by: NURSE PRACTITIONER

## 2024-04-15 PROCEDURE — 99214 OFFICE O/P EST MOD 30 MIN: CPT | Performed by: NURSE PRACTITIONER

## 2024-04-15 ASSESSMENT — ENCOUNTER SYMPTOMS: DIARRHEA: 1

## 2024-04-19 ENCOUNTER — HOSPITAL ENCOUNTER (OUTPATIENT)
Dept: INFUSION THERAPY | Age: 74
End: 2024-04-19

## 2024-04-25 ENCOUNTER — TELEPHONE (OUTPATIENT)
Dept: ENDOCRINOLOGY | Age: 74
End: 2024-04-25

## 2024-04-25 NOTE — TELEPHONE ENCOUNTER
The pt has her Prolia scheduled for 4/30/24. I spoke to the pt and reminded her to get her CMP and vitamin d labs drawn before her infusion. The orders are active and in her chart. She will try to go tomorrow or Monday.

## 2024-04-30 ENCOUNTER — TELEPHONE (OUTPATIENT)
Dept: OCCUPATIONAL THERAPY | Age: 74
End: 2024-04-30

## 2024-04-30 ENCOUNTER — HOSPITAL ENCOUNTER (OUTPATIENT)
Dept: INFUSION THERAPY | Age: 74
Setting detail: INFUSION SERIES
Discharge: HOME | End: 2024-04-30

## 2024-04-30 NOTE — TELEPHONE ENCOUNTER
Therapist received referral for lymphedema evaluation.  Therapist called to schedule evaluation.  Therapist had lengthy discussion regarding current swelling issues to include current management program she is following.  Therapist further discussed compression garments and insurance coverage.  Patient scheduled for evaluation appointment for 3May 2024 at 1000

## 2024-05-03 ENCOUNTER — HOSPITAL ENCOUNTER (OUTPATIENT)
Dept: OCCUPATIONAL THERAPY | Age: 74
Setting detail: THERAPIES SERIES
Discharge: HOME | End: 2024-05-03
Payer: MEDICARE

## 2024-05-03 PROCEDURE — 97165 OT EVAL LOW COMPLEX 30 MIN: CPT | Performed by: OCCUPATIONAL THERAPIST

## 2024-05-03 NOTE — PROGRESS NOTES
Occupational Therapy      OCCUPATIONAL THERAPY INITIAL LYMPHEDEMA EVALUATION    [facility]  [address] [facility]  [address]   Phone: [phone]  Fax: [phone]     Date:  5/3/2024  Initial Evaluation Date: 2024   Evaluating Therapist: KAELA Ramon/L, CLT-GASPER    Patient Name:  Mirna Banuelos    :  1950    Restrictions/Precautions:   fall risk  Diagnosis:  Lymphedema (I89.0), Malignant neoplasm of left breast in female, estrogen receptor positive, unspecified site of breast (C50.912, Z17.0)       Date of Surgery/Injury: n/a    Insurance/Certification information:  [employer] Medicare     [id number]  Plan of care signed (Y/N): N  Visit# / total visits: Evaluation    Referring Practitioner:  ERNESTINE Roche CNP            NPI:  [id number]  Specific Practitioner Orders: Evaluation and Treatment    Assessment of current deficits   []Pain  []Skin Integrity   [x]Lymphedema   []Functional transfers/mobility   []ADLs   []Strength    []Cognition  []IADLs   []Safety Awareness   []  Motor Endurance    []Fine Motor Coordination   []Balance   []Vision/perception  []Sensation []Gross Motor Coordination  []ROM     OT PLAN OF CARE   OT POC based on physician orders, patient diagnosis and results of clinical assessment    Frequency/Duration:  1-2x per week for 12 treatment sessions from 2024 through ly   Specific OT Treatment to include:     Plan of Care:     [x]97140-Manual Lymph Drainage and Combined Decongestive Therapy  [x]63924- Skilled Multilayer Short Stretch Compression Bandaging/ Therapeutic Exercise  [x]Skin Care Education [x]HEP including Self MLD Education and/or Self Bandaging  [x]Education for Lymphedema Risks/Precautions     [x] Caregiver Education   []other:      Patient Specific Goal: to not have lymphedema      STG: 3 weeks   Patient will demonstrate knowledge and understanding for lymphedema precautions, skin care and self

## 2024-05-06 ENCOUNTER — HOSPITAL ENCOUNTER (OUTPATIENT)
Dept: OCCUPATIONAL THERAPY | Age: 74
Setting detail: THERAPIES SERIES
Discharge: HOME | End: 2024-05-06
Payer: MEDICARE

## 2024-05-06 ENCOUNTER — HOSPITAL ENCOUNTER (OUTPATIENT)
Dept: INFUSION THERAPY | Age: 74
Setting detail: INFUSION SERIES
Discharge: HOME | End: 2024-05-06
Payer: MEDICARE

## 2024-05-06 VITALS
OXYGEN SATURATION: 100 % | TEMPERATURE: 96.9 F | WEIGHT: 180 LBS | HEART RATE: 65 BPM | DIASTOLIC BLOOD PRESSURE: 90 MMHG | SYSTOLIC BLOOD PRESSURE: 161 MMHG | BODY MASS INDEX: 33.13 KG/M2 | HEIGHT: 62 IN | RESPIRATION RATE: 18 BRPM

## 2024-05-06 DIAGNOSIS — M81.0 SENILE OSTEOPOROSIS: Primary | ICD-10-CM

## 2024-05-06 PROCEDURE — 6360000002 HC RX W HCPCS: Performed by: INTERNAL MEDICINE

## 2024-05-06 PROCEDURE — 97535 SELF CARE MNGMENT TRAINING: CPT | Performed by: OCCUPATIONAL THERAPIST

## 2024-05-06 PROCEDURE — 96372 THER/PROPH/DIAG INJ SC/IM: CPT

## 2024-05-06 RX ADMIN — DENOSUMAB 60 MG: 60 INJECTION SUBCUTANEOUS at 09:52

## 2024-05-06 ASSESSMENT — PAIN DESCRIPTION - PAIN TYPE: TYPE: CHRONIC PAIN

## 2024-05-06 ASSESSMENT — PAIN DESCRIPTION - LOCATION: LOCATION: WRIST;KNEE

## 2024-05-06 ASSESSMENT — PAIN DESCRIPTION - ORIENTATION: ORIENTATION: RIGHT;LEFT

## 2024-05-06 ASSESSMENT — PAIN SCALES - GENERAL: PAINLEVEL_OUTOF10: 5

## 2024-05-06 ASSESSMENT — PAIN - FUNCTIONAL ASSESSMENT: PAIN_FUNCTIONAL_ASSESSMENT: PREVENTS OR INTERFERES SOME ACTIVE ACTIVITIES AND ADLS

## 2024-05-06 ASSESSMENT — PAIN DESCRIPTION - FREQUENCY: FREQUENCY: INTERMITTENT

## 2024-05-06 ASSESSMENT — PAIN DESCRIPTION - DESCRIPTORS: DESCRIPTORS: ACHING

## 2024-05-06 ASSESSMENT — PAIN DESCRIPTION - ONSET: ONSET: ON-GOING

## 2024-05-06 NOTE — PROGRESS NOTES
How Severe Is Your Skin Lesion?: moderate Occupational Therapy        OCCUPATIONAL THERAPY PROGRESS NOTE  [facility]  [address] [facility]  [address]              Phone: [phone]             Fax: [phone]    Date:  2024  Initial Evaluation Date: 2024     Evaluating Therapist: KAELA Ramon/L, CLT-GASPER    Patient Name:  Mirna Banuelos    :  1950    Restrictions/Precautions:   fall risk  Diagnosis:  Lymphedema (I89.0), Malignant neoplasm of left breast in female, estrogen receptor positive, unspecified site of breast (C50.912, Z17.0)        Date of Surgery/Injury: n/a    Insurance/Certification information:  [employer] Medicare [id number]   Plan of care signed (Y/N): N  Visit# / total visits: Evaluation + Visit #1    Referring Practitioner:  ERNESTINE Roche CNP   Specific Practitioner Orders: Evaluation and Treatment    Assessment of current deficits   []Pain  []Skin Integrity   [x]Lymphedema   []Functional transfers/mobility   []ADLs   []Strength    []Cognition  []IADLs   []Safety Awareness   []  Motor Endurance    []Fine Motor Coordination   []Balance   []Vision/perception  []Sensation []Gross Motor Coordination  []ROM     OT PLAN OF CARE   OT POC based on physician orders, patient diagnosis and results of clinical assessment    Frequency/Duration:  1-2x per week for 12 treatment sessions from 2024 through 2024   Specific OT Treatment to include:     Plan of Care:     [x]97140-Manual Lymph Drainage and Combined Decongestive Therapy  [x]08512- Skilled Multilayer Short Stretch Compression Bandaging/ Therapeutic Exercise  [x]Skin Care Education [x]HEP including Self MLD Education and/or Self Bandaging  [x]Education for Lymphedema Risks/Precautions     [x] Caregiver Education   []other:      Patient Specific Goal: to not have lymphedema      STG: 3 weeks   Patient will demonstrate knowledge and understanding for lymphedema precautions, skin care and self management to  Is This A New Presentation, Or A Follow-Up?: Skin Lesion

## 2024-05-06 NOTE — PROGRESS NOTES
Tolerated injection well.  Reviewed therapy plan, offered education material and/or discharge material, reviewed medication information and signs and symptoms  and educated on possible side effects, verbalizes good knowledge of current plan patient verbalizes understanding, and has no signs or symptoms to report at this time.   Patient discharged. Patient alert and oriented x3.   No distress noted.   Vital signs stable.   Patient denies any new or worsening pain.  Patient denies any needs.  All questions answered.  Next appointment scheduled. Instructed on lab draw for next injection.

## 2024-05-10 ENCOUNTER — HOSPITAL ENCOUNTER (OUTPATIENT)
Dept: OCCUPATIONAL THERAPY | Age: 74
Setting detail: THERAPIES SERIES
Discharge: HOME | End: 2024-05-10
Payer: MEDICARE

## 2024-05-10 PROCEDURE — 97535 SELF CARE MNGMENT TRAINING: CPT | Performed by: OCCUPATIONAL THERAPIST

## 2024-05-10 PROCEDURE — 97140 MANUAL THERAPY 1/> REGIONS: CPT | Performed by: OCCUPATIONAL THERAPIST

## 2024-05-10 NOTE — PROGRESS NOTES
Occupational Therapy        OCCUPATIONAL THERAPY PROGRESS NOTE  [facility]  [address] [facility]  [address]              Phone: [phone]             Fax: [phone]    Date:  5/10/2024  Initial Evaluation Date: 2024     Evaluating Therapist: KAELA Ramon/L, CLT-GASPER    Patient Name:  Mirna Banuelos    :  1950    Restrictions/Precautions:   fall risk  Diagnosis:  Lymphedema (I89.0), Malignant neoplasm of left breast in female, estrogen receptor positive, unspecified site of breast (C50.912, Z17.0)        Date of Surgery/Injury: n/a    Insurance/Certification information:  [employer] Medicare [id number]   Plan of care signed (Y/N): N  Visit# / total visits: Evaluation + Visit #2    Referring Practitioner:  ERNESTINE Roche CNP   Specific Practitioner Orders: Evaluation and Treatment    Assessment of current deficits   []Pain  []Skin Integrity   [x]Lymphedema   []Functional transfers/mobility   []ADLs   []Strength    []Cognition  []IADLs   []Safety Awareness   []  Motor Endurance    []Fine Motor Coordination   []Balance   []Vision/perception  []Sensation []Gross Motor Coordination  []ROM     OT PLAN OF CARE   OT POC based on physician orders, patient diagnosis and results of clinical assessment    Frequency/Duration:  1-2x per week for 12 treatment sessions from 2024 through 2024   Specific OT Treatment to include:     Plan of Care:     [x]97140-Manual Lymph Drainage and Combined Decongestive Therapy  [x]49330- Skilled Multilayer Short Stretch Compression Bandaging/ Therapeutic Exercise  [x]Skin Care Education [x]HEP including Self MLD Education and/or Self Bandaging  [x]Education for Lymphedema Risks/Precautions     [x] Caregiver Education   []other:      Patient Specific Goal: to not have lymphedema      STG: 3 weeks   Patient will demonstrate knowledge and understanding for lymphedema precautions, skin care and self management to

## 2024-05-13 ENCOUNTER — HOSPITAL ENCOUNTER (OUTPATIENT)
Dept: OCCUPATIONAL THERAPY | Age: 74
Setting detail: THERAPIES SERIES
Discharge: HOME | End: 2024-05-13
Payer: MEDICARE

## 2024-05-13 PROCEDURE — 97535 SELF CARE MNGMENT TRAINING: CPT | Performed by: OCCUPATIONAL THERAPIST

## 2024-05-13 PROCEDURE — 97140 MANUAL THERAPY 1/> REGIONS: CPT | Performed by: OCCUPATIONAL THERAPIST

## 2024-05-13 NOTE — PROGRESS NOTES
Occupational Therapy        OCCUPATIONAL THERAPY PROGRESS NOTE  [facility]  [address] [facility]  [address]              Phone: [phone]             Fax: [phone]    Date:  2024  Initial Evaluation Date: 2024     Evaluating Therapist: KAELA Ramon/L, CLT-GASPER    Patient Name:  Mirna Banuelos    :  1950    Restrictions/Precautions:   fall risk  Diagnosis:  Lymphedema (I89.0), Malignant neoplasm of left breast in female, estrogen receptor positive, unspecified site of breast (C50.912, Z17.0)        Date of Surgery/Injury: n/a    Insurance/Certification information:  [employer] Medicare [id number]   Plan of care signed (Y/N): N  Visit# / total visits: Evaluation + Visit #3    Referring Practitioner:  ERNESTINE Roche CNP   Specific Practitioner Orders: Evaluation and Treatment    Assessment of current deficits   []Pain  []Skin Integrity   [x]Lymphedema   []Functional transfers/mobility   []ADLs   []Strength    []Cognition  []IADLs   []Safety Awareness   []  Motor Endurance    []Fine Motor Coordination   []Balance   []Vision/perception  []Sensation []Gross Motor Coordination  []ROM     OT PLAN OF CARE   OT POC based on physician orders, patient diagnosis and results of clinical assessment    Frequency/Duration:  1-2x per week for 12 treatment sessions from 2024 through 2024   Specific OT Treatment to include:     Plan of Care:     [x]97140-Manual Lymph Drainage and Combined Decongestive Therapy  [x]61332- Skilled Multilayer Short Stretch Compression Bandaging/ Therapeutic Exercise  [x]Skin Care Education [x]HEP including Self MLD Education and/or Self Bandaging  [x]Education for Lymphedema Risks/Precautions     [x] Caregiver Education   []other:      Patient Specific Goal: to not have lymphedema      STG: 3 weeks   Patient will demonstrate knowledge and understanding for lymphedema precautions, skin care and self management to

## 2024-05-16 ENCOUNTER — HOSPITAL ENCOUNTER (OUTPATIENT)
Dept: OCCUPATIONAL THERAPY | Age: 74
Setting detail: THERAPIES SERIES
Discharge: HOME | End: 2024-05-16
Payer: MEDICARE

## 2024-05-16 PROCEDURE — 97140 MANUAL THERAPY 1/> REGIONS: CPT | Performed by: OCCUPATIONAL THERAPIST

## 2024-05-16 PROCEDURE — 97535 SELF CARE MNGMENT TRAINING: CPT | Performed by: OCCUPATIONAL THERAPIST

## 2024-05-16 NOTE — PROGRESS NOTES
Occupational Therapy        OCCUPATIONAL THERAPY PROGRESS NOTE  [facility]  [address] [facility]  [address]              Phone: [phone]             Fax: [phone]    Date:  2024  Initial Evaluation Date: 2024     Evaluating Therapist: KAELA Ramon/L, CLT-GASPER    Patient Name:  Mirna Banuelos    :  1950    Restrictions/Precautions:   fall risk  Diagnosis:  Lymphedema (I89.0), Malignant neoplasm of left breast in female, estrogen receptor positive, unspecified site of breast (C50.912, Z17.0)        Date of Surgery/Injury: n/a    Insurance/Certification information:  [employer] Medicare [id number]   Plan of care signed (Y/N): N  Visit# / total visits: Evaluation + Visit #4    Referring Practitioner:  ERNESTINE Roche CNP   Specific Practitioner Orders: Evaluation and Treatment    Assessment of current deficits   []Pain  []Skin Integrity   [x]Lymphedema   []Functional transfers/mobility   []ADLs   []Strength    []Cognition  []IADLs   []Safety Awareness   []  Motor Endurance    []Fine Motor Coordination   []Balance   []Vision/perception  []Sensation []Gross Motor Coordination  []ROM     OT PLAN OF CARE   OT POC based on physician orders, patient diagnosis and results of clinical assessment    Frequency/Duration:  1-2x per week for 12 treatment sessions from 2024 through 2024   Specific OT Treatment to include:     Plan of Care:     [x]97140-Manual Lymph Drainage and Combined Decongestive Therapy  [x]17950- Skilled Multilayer Short Stretch Compression Bandaging/ Therapeutic Exercise  [x]Skin Care Education [x]HEP including Self MLD Education and/or Self Bandaging  [x]Education for Lymphedema Risks/Precautions     [x] Caregiver Education   []other:      Patient Specific Goal: to not have lymphedema      STG: 3 weeks   Patient will demonstrate knowledge and understanding for lymphedema precautions, skin care and self management to

## 2024-05-22 ENCOUNTER — HOSPITAL ENCOUNTER (OUTPATIENT)
Dept: OCCUPATIONAL THERAPY | Age: 74
Setting detail: THERAPIES SERIES
End: 2024-05-22
Payer: MEDICARE

## 2024-05-23 ENCOUNTER — HOSPITAL ENCOUNTER (OUTPATIENT)
Dept: OCCUPATIONAL THERAPY | Age: 74
Setting detail: THERAPIES SERIES
Discharge: HOME | End: 2024-05-23
Payer: MEDICARE

## 2024-05-23 DIAGNOSIS — C18.9 MALIGNANT NEOPLASM OF COLON, UNSPECIFIED PART OF COLON (HCC): Primary | ICD-10-CM

## 2024-05-23 NOTE — PROGRESS NOTES
Occupational Therapy          [facility]  [facility]  [address] [facility] [address]  Dept: [phone]  Loc: [phone]   [facility] OT Fax: [phone]    Cancellation/No Show Note      Date:  2024    Patient Name:  Mirna Banuelos     :  1950         PT ID: [id number]    Total missed visits including today: 0       Total number of no shows: 0    For today's appointment patient:   [x]  Cancelled   []  Rescheduled appointment   []  No-show     Reason given by patient:   []  Patient ill   []  Conflicting appointment   []  No transportation   []  Conflict with work   []  No reason given   []  Other:    Comments:                    Comments:  patient cancelled scheduled appointment secondary to wanting to figure out what her bill is this far.  Patient wants to figure out her financials prior to continuing in lymphedema care.  Therapist provided support and encouragement as able.  Patient to notify therapist when she is ready to re schedule.    Electronically signed by:  Dawit Oconnell OTR/L, CLT-GASPER

## 2024-05-24 ENCOUNTER — HOSPITAL ENCOUNTER (OUTPATIENT)
Dept: INFUSION THERAPY | Age: 74
Discharge: HOME | End: 2024-05-24
Payer: MEDICARE

## 2024-05-24 ENCOUNTER — OFFICE VISIT (OUTPATIENT)
Dept: ONCOLOGY | Age: 74
End: 2024-05-24

## 2024-05-24 VITALS
OXYGEN SATURATION: 99 % | DIASTOLIC BLOOD PRESSURE: 80 MMHG | HEART RATE: 69 BPM | BODY MASS INDEX: 36.25 KG/M2 | WEIGHT: 197 LBS | TEMPERATURE: 97.3 F | HEIGHT: 62 IN | SYSTOLIC BLOOD PRESSURE: 128 MMHG

## 2024-05-24 DIAGNOSIS — I82.599 CHRONIC DEEP VEIN THROMBOSIS (DVT) OF OTHER VEIN OF LOWER EXTREMITY, UNSPECIFIED LATERALITY (HCC): ICD-10-CM

## 2024-05-24 DIAGNOSIS — C18.9 MALIGNANT NEOPLASM OF COLON, UNSPECIFIED PART OF COLON (HCC): ICD-10-CM

## 2024-05-24 DIAGNOSIS — M81.0 AGE-RELATED OSTEOPOROSIS WITHOUT CURRENT PATHOLOGICAL FRACTURE: Primary | ICD-10-CM

## 2024-05-24 LAB
ALBUMIN SERPL-MCNC: 3.8 G/DL (ref 3.5–5.2)
ALP SERPL-CCNC: 81 U/L (ref 35–104)
ALT SERPL-CCNC: 9 U/L (ref 0–32)
ANION GAP SERPL CALCULATED.3IONS-SCNC: 9 MMOL/L (ref 7–16)
AST SERPL-CCNC: 16 U/L (ref 0–31)
BASOPHILS # BLD: 0.03 K/UL (ref 0–0.2)
BASOPHILS NFR BLD: 0 % (ref 0–2)
BILIRUB SERPL-MCNC: 0.4 MG/DL (ref 0–1.2)
BUN SERPL-MCNC: 22 MG/DL (ref 6–23)
CALCIUM SERPL-MCNC: 8.5 MG/DL (ref 8.6–10.2)
CHLORIDE SERPL-SCNC: 104 MMOL/L (ref 98–107)
CO2 SERPL-SCNC: 27 MMOL/L (ref 22–29)
CREAT SERPL-MCNC: 0.9 MG/DL (ref 0.5–1)
EOSINOPHIL # BLD: 0.25 K/UL (ref 0.05–0.5)
EOSINOPHILS RELATIVE PERCENT: 4 % (ref 0–6)
ERYTHROCYTE [DISTWIDTH] IN BLOOD BY AUTOMATED COUNT: 13.7 % (ref 11.5–15)
GFR, ESTIMATED: 64 ML/MIN/1.73M2
GLUCOSE SERPL-MCNC: 90 MG/DL (ref 74–99)
HCT VFR BLD AUTO: 40.1 % (ref 34–48)
HGB BLD-MCNC: 12.2 G/DL (ref 11.5–15.5)
IMM GRANULOCYTES # BLD AUTO: 0.04 K/UL (ref 0–0.58)
IMM GRANULOCYTES NFR BLD: 1 % (ref 0–5)
LYMPHOCYTES NFR BLD: 1.26 K/UL (ref 1.5–4)
LYMPHOCYTES RELATIVE PERCENT: 18 % (ref 20–42)
MCH RBC QN AUTO: 29.3 PG (ref 26–35)
MCHC RBC AUTO-ENTMCNC: 30.4 G/DL (ref 32–34.5)
MCV RBC AUTO: 96.4 FL (ref 80–99.9)
MONOCYTES NFR BLD: 0.7 K/UL (ref 0.1–0.95)
MONOCYTES NFR BLD: 10 % (ref 2–12)
NEUTROPHILS NFR BLD: 68 % (ref 43–80)
NEUTS SEG NFR BLD: 4.86 K/UL (ref 1.8–7.3)
PLATELET # BLD AUTO: 148 K/UL (ref 130–450)
PMV BLD AUTO: 9.2 FL (ref 7–12)
POTASSIUM SERPL-SCNC: 4.4 MMOL/L (ref 3.5–5)
PROT SERPL-MCNC: 7.3 G/DL (ref 6.4–8.3)
RBC # BLD AUTO: 4.16 M/UL (ref 3.5–5.5)
SODIUM SERPL-SCNC: 140 MMOL/L (ref 132–146)
WBC OTHER # BLD: 7.1 K/UL (ref 4.5–11.5)

## 2024-05-24 PROCEDURE — 36415 COLL VENOUS BLD VENIPUNCTURE: CPT

## 2024-05-24 PROCEDURE — 85025 COMPLETE CBC W/AUTO DIFF WBC: CPT

## 2024-05-24 PROCEDURE — 80053 COMPREHEN METABOLIC PANEL: CPT

## 2024-05-24 RX ORDER — ANASTROZOLE 1 MG/1
1 TABLET ORAL DAILY
Qty: 90 TABLET | Refills: 3 | Status: SHIPPED | OUTPATIENT
Start: 2024-05-24

## 2024-05-24 NOTE — PROGRESS NOTES
Patient provided with discharge instructions.  All questions answered.  Patient understands follow up plan of care.     
able to tolerate dose dense AC-T, the side effects and the schedule of treatment were reviewed with the patient.  She had decided against chemotherapy.  We will notify radiation oncology to plan adjuvant RT.  Discussed with the patient adjuvant endocrine therapy, Arimidex, the side effects were reviewed with the patient, including but not limited to hot flashes, mood changes, joint pain/stiffness/discomfort and osteoporosis.    The patient completed adjuvant radiation therapy on 5/9/2023,she was started on adjuvant endocrine therapy with Arimidex on 3/17/2023, tolerating it well, no reported side effects.  Continue Arimidex, the patient had decided against treatment with Verzenio due to her concerns about the diarrhea.  Refilled Arimidex    DEXA scan was done on 4/5/2023, revealing osteoporosis, T score -3.2, the patient was prescribed Fosamax in the past, which had caused severe joints pain, referred the patient to [facility] for the osteoporosis, she was started on Prolia in May 2024.    Labs reviewed, the patient had anemia following the surgery, labs reviewed today, hemoglobin had normalized 12.2 G/DL, hematocrit 40.1.  Will monitor her counts.    I discussed with the patient the importance of monthly BSE and routine screening mammograms.  She is scheduled for screening mammogram in October, await results.    The patient is doing well without any evidence of recurrence, we will continue with surveillance.    The surveillance guidelines were reviewed with the patient.    RTC in 3 months.       Thank you for allowing us to participate in the care of Mrs. Banuelos.    SHEREE MANDEL MD   HEMATOLOGY/MEDICAL ONCOLOGY  [facility] MED ONCOLOGY  [address] [place] [id number]  Dept: [phone]  Loc: [phone]

## 2024-07-12 ENCOUNTER — HOSPITAL ENCOUNTER (OUTPATIENT)
Dept: OCCUPATIONAL THERAPY | Age: 74
Setting detail: THERAPIES SERIES
Discharge: HOME | End: 2024-07-12

## 2024-07-12 NOTE — DISCHARGE SUMMARY
OCCUPATIONAL THERAPY DISCHARGE NOTE  [facility]  [address] [facility]  [address]              Phone: [phone]             Fax: [phone]     Date:  2024  Initial Evaluation Date: 3M2024     Evaluating Therapist: KAELA Ramon/L, CLT-GASPER    Patient Name:  Mirna Banuelos    :  1950    Restrictions/Precautions:  fall risk  Diagnosis:   Lymphedema (I89.0), Malignant neoplasm of left breast in female, estrogen receptor positive, unspecified site of breast (C50.912, Z17.0)                Date of Surgery/Injury: n/a    Insurance/Certification information:  [employer] Medicare [id number]   Plan of care signed (Y/N): Yes  Visit#:  4   Total Visits to date:  4   Current update duration from 2024 to 2024  Cancels/No-shows to date: 1  Last seen by therapist:  2024  Patient reaction to treatment:  patient made steady gains toward therapy goals.  Patient called and cancelled last scheduled lymphedema clinic treatment.  Patient stated she had to get her financials in order prior to returning to clinic.  Patient has not called to schedule back into clinic.  Patient no discharged.  Garment / DME recommended:  compression bra    Referring Practitioner:  ERNESTINE Roche - CNP   Specific Practitioner Orders: Evaluation and Treatment    Assessment of current deficits   []Pain  []Skin Integrity   [x]Lymphedema   []Functional transfers/mobility   []ADLs   []Strength    []Cognition  []IADLs   []Safety Awareness   []  Motor Endurance    []Fine Motor Coordination   []Balance   []Vision/perception  []Sensation []Gross Motor Coordination  []ROM     OT PLAN OF CARE   OT POC based on physician orders, patient diagnosis and results of clinical assessment    Frequency/Duration:  patient now discharged from lymphedema clinic  Specific OT Treatment to include:     Plan of Care:     [x]77140-Manual Lymph Drainage and Combined Decongestive Therapy  [x]09691-

## 2024-09-10 ENCOUNTER — HOSPITAL ENCOUNTER (OUTPATIENT)
Dept: INFUSION THERAPY | Age: 74
End: 2024-09-10

## 2024-09-24 ENCOUNTER — OFFICE VISIT (OUTPATIENT)
Dept: ENDOCRINOLOGY | Age: 74
End: 2024-09-24
Payer: MEDICARE

## 2024-09-24 VITALS
OXYGEN SATURATION: 98 % | SYSTOLIC BLOOD PRESSURE: 158 MMHG | WEIGHT: 210 LBS | BODY MASS INDEX: 38.64 KG/M2 | DIASTOLIC BLOOD PRESSURE: 80 MMHG | HEART RATE: 67 BPM | HEIGHT: 62 IN

## 2024-09-24 DIAGNOSIS — M81.0 OSTEOPOROSIS, UNSPECIFIED OSTEOPOROSIS TYPE, UNSPECIFIED PATHOLOGICAL FRACTURE PRESENCE: Primary | ICD-10-CM

## 2024-09-24 DIAGNOSIS — E55.9 VITAMIN D DEFICIENCY: ICD-10-CM

## 2024-09-24 PROCEDURE — 3077F SYST BP >= 140 MM HG: CPT | Performed by: CLINICAL NURSE SPECIALIST

## 2024-09-24 PROCEDURE — 99214 OFFICE O/P EST MOD 30 MIN: CPT | Performed by: CLINICAL NURSE SPECIALIST

## 2024-09-24 PROCEDURE — 3079F DIAST BP 80-89 MM HG: CPT | Performed by: CLINICAL NURSE SPECIALIST

## 2024-09-24 PROCEDURE — 1123F ACP DISCUSS/DSCN MKR DOCD: CPT | Performed by: CLINICAL NURSE SPECIALIST

## 2024-09-24 RX ORDER — LORATADINE 10 MG/1
TABLET, ORALLY DISINTEGRATING ORAL
COMMUNITY
Start: 2023-08-14

## 2024-09-24 RX ORDER — GABAPENTIN 100 MG/1
CAPSULE ORAL
COMMUNITY
Start: 2024-09-10

## 2024-09-24 RX ORDER — LOPERAMIDE HCL 2 MG
CAPSULE ORAL
COMMUNITY
Start: 2024-07-06

## 2024-09-24 RX ORDER — ESCITALOPRAM OXALATE 5 MG/1
5 TABLET ORAL DAILY
COMMUNITY
Start: 2024-08-28

## 2024-09-24 RX ORDER — AMLODIPINE BESYLATE 5 MG/1
5 TABLET ORAL DAILY
COMMUNITY
Start: 2024-07-31

## 2024-09-30 NOTE — PROGRESS NOTES
Subjective:    This note was copied forward from the last encounter.  Essential components for this patient record were reviewed and verified on this visit including:  recent hospitalizations, recent imaging, PMH, PSH, FH, SOC HX, Allergies, and Medications were reviewed and updated as appropriate.  In addition, the assessment and plan were copied from prior office note and updated accordingly.     Patient ID: Mirna Banuelos is a 74 y.o. female.    BRENDA Isbell is a pleasant 74 y.o. woman who presents for follow up of her left breast cancer.  She has a past medical history of recurrent VTE with factor V Leiden homozygosity.  Also history of stage I colon cancer, left bundle branch block, type II DM chronic CHF, hypothyroidism, iron deficiency anemia, CKD, chronic pain syndrome, asthma, and morbid obesity..    Stage I Colon cancer post right hemicolectomy on 11/28/2020 by Dr. Hayden at [facility].  Afterwards the patient was reexplored with findings of missed enterotomy which was repaired. After this second surgery experienced a cardiopulmonary arrest, ROSC was achieved and she was intubated.  Patient was transferred from [place] to the [facility]. On 11/27 she underwent exploratory laparotomy with small bowel resection plus loop ileostomy plus distal mucous fistula and excision of mesh. Afterwards patient developed an abdominal abscess that required IR drainage .      Breast cancer risk factors include personal history of colon cancer, mom with lung and brain cancer at age 58, son with renal cell carcinoma age 42.  Menarche age 13.  Menopause age 40 she took hormonal therapy for greater than 10 years.  G3, P2.  First live birth age 20 and she did breast-feed.    The left breast lesion was identified on screening mammogram at [facility].    09/06/2022 she underwent a left breast ultrasound-guided biopsy of a 1.1 cm mass at [facility]:  Pathology revealed mild fibrocystic changes.  Results

## 2024-10-01 ENCOUNTER — OFFICE VISIT (OUTPATIENT)
Dept: ONCOLOGY | Age: 74
End: 2024-10-01

## 2024-10-01 ENCOUNTER — HOSPITAL ENCOUNTER (OUTPATIENT)
Dept: INFUSION THERAPY | Age: 74
Discharge: HOME | End: 2024-10-01
Payer: MEDICARE

## 2024-10-01 VITALS
WEIGHT: 210.7 LBS | BODY MASS INDEX: 38.77 KG/M2 | HEIGHT: 62 IN | TEMPERATURE: 98.6 F | SYSTOLIC BLOOD PRESSURE: 154 MMHG | OXYGEN SATURATION: 99 % | HEART RATE: 61 BPM | DIASTOLIC BLOOD PRESSURE: 65 MMHG | RESPIRATION RATE: 18 BRPM

## 2024-10-01 DIAGNOSIS — C18.9 MALIGNANT NEOPLASM OF COLON, UNSPECIFIED PART OF COLON (HCC): ICD-10-CM

## 2024-10-01 DIAGNOSIS — I82.599 CHRONIC DEEP VEIN THROMBOSIS (DVT) OF OTHER VEIN OF LOWER EXTREMITY, UNSPECIFIED LATERALITY (HCC): ICD-10-CM

## 2024-10-01 DIAGNOSIS — M81.0 AGE-RELATED OSTEOPOROSIS WITHOUT CURRENT PATHOLOGICAL FRACTURE: Primary | ICD-10-CM

## 2024-10-01 LAB
ALBUMIN SERPL-MCNC: 4 G/DL (ref 3.5–5.2)
ALP SERPL-CCNC: 66 U/L (ref 35–104)
ALT SERPL-CCNC: 9 U/L (ref 0–32)
ANION GAP SERPL CALCULATED.3IONS-SCNC: 11 MMOL/L (ref 7–16)
AST SERPL-CCNC: 23 U/L (ref 0–31)
BASOPHILS # BLD: 0.04 K/UL (ref 0–0.2)
BASOPHILS NFR BLD: 1 % (ref 0–2)
BILIRUB SERPL-MCNC: 0.6 MG/DL (ref 0–1.2)
BUN SERPL-MCNC: 22 MG/DL (ref 6–23)
CALCIUM SERPL-MCNC: 9.6 MG/DL (ref 8.6–10.2)
CHLORIDE SERPL-SCNC: 103 MMOL/L (ref 98–107)
CO2 SERPL-SCNC: 26 MMOL/L (ref 22–29)
CREAT SERPL-MCNC: 1 MG/DL (ref 0.5–1)
EOSINOPHIL # BLD: 0.34 K/UL (ref 0.05–0.5)
EOSINOPHILS RELATIVE PERCENT: 5 % (ref 0–6)
ERYTHROCYTE [DISTWIDTH] IN BLOOD BY AUTOMATED COUNT: 13 % (ref 11.5–15)
GFR, ESTIMATED: 61 ML/MIN/1.73M2
GLUCOSE SERPL-MCNC: 94 MG/DL (ref 74–99)
HCT VFR BLD AUTO: 38.3 % (ref 34–48)
HGB BLD-MCNC: 12.6 G/DL (ref 11.5–15.5)
IMM GRANULOCYTES # BLD AUTO: 0.03 K/UL (ref 0–0.58)
IMM GRANULOCYTES NFR BLD: 0 % (ref 0–5)
LYMPHOCYTES NFR BLD: 1.37 K/UL (ref 1.5–4)
LYMPHOCYTES RELATIVE PERCENT: 18 % (ref 20–42)
MCH RBC QN AUTO: 30.7 PG (ref 26–35)
MCHC RBC AUTO-ENTMCNC: 32.9 G/DL (ref 32–34.5)
MCV RBC AUTO: 93.4 FL (ref 80–99.9)
MONOCYTES NFR BLD: 0.6 K/UL (ref 0.1–0.95)
MONOCYTES NFR BLD: 8 % (ref 2–12)
NEUTROPHILS NFR BLD: 68 % (ref 43–80)
NEUTS SEG NFR BLD: 5.15 K/UL (ref 1.8–7.3)
PLATELET # BLD AUTO: 156 K/UL (ref 130–450)
PMV BLD AUTO: 9.4 FL (ref 7–12)
POTASSIUM SERPL-SCNC: 4.9 MMOL/L (ref 3.5–5)
PROT SERPL-MCNC: 7.5 G/DL (ref 6.4–8.3)
RBC # BLD AUTO: 4.1 M/UL (ref 3.5–5.5)
SODIUM SERPL-SCNC: 140 MMOL/L (ref 132–146)
WBC OTHER # BLD: 7.5 K/UL (ref 4.5–11.5)

## 2024-10-01 PROCEDURE — 85025 COMPLETE CBC W/AUTO DIFF WBC: CPT

## 2024-10-01 PROCEDURE — 80053 COMPREHEN METABOLIC PANEL: CPT

## 2024-10-01 PROCEDURE — 36415 COLL VENOUS BLD VENIPUNCTURE: CPT

## 2024-10-01 RX ORDER — ANASTROZOLE 1 MG/1
1 TABLET ORAL DAILY
Qty: 90 TABLET | Refills: 3 | Status: SHIPPED | OUTPATIENT
Start: 2024-10-01

## 2024-10-01 NOTE — PROGRESS NOTES
Patient provided with discharge instructions, received printed AVS.  All questions answered.  Patient understands follow up plan of care.     
contraindication to AC in the future.    The patient was last seen in the office in November 2020, the patient was diagnosed with colon cancer, CEA was 0.6, there was no evidence of metastatic disease she underwent a right hemicolectomy on 11/28/2020 by Dr. Hayden at [facility], was diagnosed with stage I colon cancer,  Afterwards the patient was peritoneal and was reexplored with findings of missed enterotomy which was repaired. After this second surgery experienced a cardiopulmonary arrest, ROSC was achieved and she was intubated.  Patient was transferred from [place] to the [facility]. On 11/27 she underwent exploratory laparotomy with small bowel resection plus loop ileostomy plus distal mucous fistula and excision of mesh. Afterwards patient developed an abdominal abscess that required IR drainage.    Epistaxis had resolved.  The patient is doing well with Eliquis at this time, continue Eliquis, was refilled.  No recurrent VTE since her last visit.    The patient was found to have an area of parenchymal distortion in the left breast, which was consistent with mild fibrocystic changes, the patient was seen by Dr. Giron, the patient had a repeat left breast biopsy done on 11/21/2022, pathology was consistent with invasive ductal carcinoma, grade 3, with associated DCIS, ER +90%, NC +70%, HER2/stephane negative, 1+ by IHC.  Ki-67 is 70%.       The patient underwent on 2/1/2023 left localized breast lumpectomy with left axillary sentinel lymph node excisional biopsy, pathology was consistent with invasive carcinoma, ductal, grade 3, with associated DCIS, negative for extensive intraductal component, no lymphovascular invasion, margins are negative, 1 sentinel lymph node was removed, was positive for macrometastasis without extranodal extension, final pathologic stage pT1c, p(sn)N1aMx.     I reviewed with the patient her pathology results, diagnosis, prognosis, and recommendations for treatment.  Staging CT

## 2024-10-07 ENCOUNTER — OFFICE VISIT (OUTPATIENT)
Dept: BREAST CENTER | Age: 74
End: 2024-10-07
Payer: MEDICARE

## 2024-10-07 VITALS
OXYGEN SATURATION: 96 % | WEIGHT: 211 LBS | BODY MASS INDEX: 38.83 KG/M2 | DIASTOLIC BLOOD PRESSURE: 80 MMHG | SYSTOLIC BLOOD PRESSURE: 180 MMHG | HEIGHT: 62 IN | TEMPERATURE: 97.9 F | HEART RATE: 71 BPM | RESPIRATION RATE: 14 BRPM

## 2024-10-07 DIAGNOSIS — E04.1 THYROID NODULE: Primary | ICD-10-CM

## 2024-10-07 PROBLEM — E66.01 MORBID OBESITY WITH BMI OF 45.0-49.9, ADULT: Status: RESOLVED | Noted: 2019-07-03 | Resolved: 2024-10-07

## 2024-10-07 PROBLEM — J45.41 MODERATE PERSISTENT ASTHMA WITH ACUTE EXACERBATION: Status: RESOLVED | Noted: 2019-08-30 | Resolved: 2024-10-07

## 2024-10-07 PROBLEM — E43 SEVERE PROTEIN-CALORIE MALNUTRITION (HCC): Status: RESOLVED | Noted: 2021-03-09 | Resolved: 2024-10-07

## 2024-10-07 PROBLEM — A41.9 SEPSIS (HCC): Status: RESOLVED | Noted: 2021-03-03 | Resolved: 2024-10-07

## 2024-10-07 PROBLEM — N17.9 ACUTE KIDNEY INJURY (HCC): Status: RESOLVED | Noted: 2021-03-02 | Resolved: 2024-10-07

## 2024-10-07 PROCEDURE — 99213 OFFICE O/P EST LOW 20 MIN: CPT | Performed by: NURSE PRACTITIONER

## 2024-10-07 PROCEDURE — 3080F DIAST BP >= 90 MM HG: CPT | Performed by: NURSE PRACTITIONER

## 2024-10-07 PROCEDURE — 1123F ACP DISCUSS/DSCN MKR DOCD: CPT | Performed by: NURSE PRACTITIONER

## 2024-10-07 PROCEDURE — 3077F SYST BP >= 140 MM HG: CPT | Performed by: NURSE PRACTITIONER

## 2024-10-07 ASSESSMENT — ENCOUNTER SYMPTOMS
BLOOD IN STOOL: 0
DIARRHEA: 1

## 2024-10-07 NOTE — PROGRESS NOTES
Patient's blood pressure elevated during her office visit today. It was 182/94 (right upper arm) at 9:35am and her recheck was 180/80 (right upper arm) at 10:00 am. Patient remained asymptomatic. States she is on blood pressure medication. She does not have a way to check it at home. I suggested if she does not have a family or friend that can check it for her to try going to a local pharmacy where they usually have an automatic cuff available. Asked her to call her PCP if it remains elevated after a day or two or if she becomes symptomatic to go to the ED to have it addressed. Patient verbalized her understanding. Will forward to Dr. Alexa Carbajal to update her.

## 2024-10-08 ENCOUNTER — TELEPHONE (OUTPATIENT)
Dept: BREAST CENTER | Age: 74
End: 2024-10-08

## 2024-10-08 DIAGNOSIS — Z17.0 MALIGNANT NEOPLASM OF LEFT BREAST IN FEMALE, ESTROGEN RECEPTOR POSITIVE, UNSPECIFIED SITE OF BREAST (HCC): ICD-10-CM

## 2024-10-08 DIAGNOSIS — C50.912 MALIGNANT NEOPLASM OF LEFT BREAST IN FEMALE, ESTROGEN RECEPTOR POSITIVE, UNSPECIFIED SITE OF BREAST (HCC): ICD-10-CM

## 2024-10-08 DIAGNOSIS — I89.0 LYMPHEDEMA: Primary | ICD-10-CM

## 2024-10-08 DIAGNOSIS — E04.2 MULTINODULAR GOITER: Primary | ICD-10-CM

## 2024-10-08 NOTE — TELEPHONE ENCOUNTER
I spoke to Akilah today regarding thyroid ultrasound revealing multinodular goiter.  She already follows with endocrinology, Dr. Jovel for her underlying osteoporosis.  Referral placed back to endocrinology for ongoing management of her multinodular thyroid.    Kierra Paris RN (Terrie), MSN, APRN-CNP, AOCNP  Advanced Oncology Certified Nurse Practitioner  [facility]-Department of Breast Surgery   Office Phone [phone]; Fax [phone]  Located within the [facility]

## 2024-10-14 ENCOUNTER — HOSPITAL ENCOUNTER (OUTPATIENT)
Dept: OCCUPATIONAL THERAPY | Age: 74
Setting detail: THERAPIES SERIES
Discharge: HOME | End: 2024-10-14
Payer: MEDICARE

## 2024-10-14 PROCEDURE — 97165 OT EVAL LOW COMPLEX 30 MIN: CPT | Performed by: OCCUPATIONAL THERAPIST

## 2024-10-14 NOTE — PROGRESS NOTES
Other     Total  120 1        Electronically signed by: KAELA Ramon/L, CLT-GASPER      Physician's Certification / Comments      Frequency/Duration 1-2 / week for 12 visits.   Certification period From: 14Oct. 2024  To: 6Jan. 2025     I have reviewed the Plan of Care established for skilled therapy services and certify that the services are required and that they will be provided while the patient is under my care.     Physician's Comments/Revisions:                   Physicians's Printed Name:  Kierra Alcaraz APRN - CNP                                   Physician's Signature:                                                               Date:      Please review Patient's OT evaluation and if you agree sign/date and fax back to us at our [facility]  [facility] OT Fax: [phone]. Thank you for your referral!

## 2024-11-05 ENCOUNTER — TELEPHONE (OUTPATIENT)
Facility: HOSPITAL | Age: 74
End: 2024-11-05

## 2024-11-05 NOTE — TELEPHONE ENCOUNTER
Recvd call from site stating that the pt was calling in for samples of Eliquis. I see that we just gave her some last month.   I tried to call the pt and had to lm asking her to call me.

## 2024-11-06 ENCOUNTER — HOSPITAL ENCOUNTER (OUTPATIENT)
Dept: INFUSION THERAPY | Age: 74
End: 2024-11-06

## 2024-11-06 ENCOUNTER — HOSPITAL ENCOUNTER (OUTPATIENT)
Dept: OCCUPATIONAL THERAPY | Age: 74
Setting detail: THERAPIES SERIES
Discharge: HOME | End: 2024-11-06
Payer: MEDICARE

## 2024-11-06 PROCEDURE — 97535 SELF CARE MNGMENT TRAINING: CPT | Performed by: OCCUPATIONAL THERAPIST

## 2024-11-06 NOTE — PROGRESS NOTES
Occupational Therapy          OCCUPATIONAL THERAPY PROGRESS NOTE  [facility]  [address] [facility]  [address]              Phone: [phone]             Fax: [phone]    Date:  2024  Initial Evaluation Date: 3M2024   Evaluating Therapist: Dawit Oconnell OT    Patient Name:  Mirna Banuelos    :  1950    Restrictions/Precautions:   fall risk  Diagnosis:  Lymphedema (I89.0), Malignant neoplasm of left breast in female, estrogen receptor positive, unspecified site of breast (C50.912, Z17.0)              Date of Surgery/Injury: n/a    Insurance/Certification information:  [facility] Medicare; [facility] Uptum Plan [employer] [facility] ADV [id number]   Plan of care signed (Y/N): N  Visit# / total visits: Evaluation + Visit #1    Referring Practitioner:   Kierra Alcaraz APRN - CNP    Specific Practitioner Orders: Evaluation and Treatment    Assessment of current deficits   []Pain  []Skin Integrity   [x]Lymphedema   []Functional transfers/mobility   []ADLs   []Strength    []Cognition  []IADLs   []Safety Awareness   []  Motor Endurance    []Fine Motor Coordination   []Balance   []Vision/perception  []Sensation []Gross Motor Coordination  [x]ROM     OT PLAN OF CARE   OT POC based on physician orders, patient diagnosis and results of clinical assessment    Frequency/Duration:   1-2x per week for 12 treatment sessions from 2025 through 2024   Specific OT Treatment to include:     Plan of Care:     [x]97140-Manual Lymph Drainage and Combined Decongestive Therapy  [x]59290- Skilled Multilayer Short Stretch Compression Bandaging/ Therapeutic Exercise  [x]Skin Care Education [x]HEP including Self MLD Education and/or Self Bandaging  [x]Education for Lymphedema Risks/Precautions     [x] Caregiver Education   []other:      Patient Specific Goal: to listen to the doctor      STG: 3 weeks   Patient will demonstrate knowledge and understanding for lymphedema precautions, skin

## 2024-11-07 ENCOUNTER — HOSPITAL ENCOUNTER (OUTPATIENT)
Age: 74
Discharge: HOME | End: 2024-11-07
Payer: MEDICARE

## 2024-11-07 DIAGNOSIS — E55.9 VITAMIN D DEFICIENCY: ICD-10-CM

## 2024-11-07 DIAGNOSIS — M81.0 OSTEOPOROSIS, UNSPECIFIED OSTEOPOROSIS TYPE, UNSPECIFIED PATHOLOGICAL FRACTURE PRESENCE: ICD-10-CM

## 2024-11-07 LAB
25(OH)D3 SERPL-MCNC: 32.8 NG/ML (ref 30–100)
ALBUMIN SERPL-MCNC: 3.9 G/DL (ref 3.5–5.2)
ALP SERPL-CCNC: 72 U/L (ref 35–104)
ALT SERPL-CCNC: 9 U/L (ref 0–32)
ANION GAP SERPL CALCULATED.3IONS-SCNC: 9 MMOL/L (ref 7–16)
AST SERPL-CCNC: 22 U/L (ref 0–31)
BILIRUB SERPL-MCNC: 0.6 MG/DL (ref 0–1.2)
BUN SERPL-MCNC: 22 MG/DL (ref 6–23)
CALCIUM SERPL-MCNC: 9.3 MG/DL (ref 8.6–10.2)
CHLORIDE SERPL-SCNC: 103 MMOL/L (ref 98–107)
CO2 SERPL-SCNC: 28 MMOL/L (ref 22–29)
CREAT SERPL-MCNC: 1 MG/DL (ref 0.5–1)
GFR, ESTIMATED: 57 ML/MIN/1.73M2
GLUCOSE SERPL-MCNC: 146 MG/DL (ref 74–99)
POTASSIUM SERPL-SCNC: 4.5 MMOL/L (ref 3.5–5)
PROT SERPL-MCNC: 7.4 G/DL (ref 6.4–8.3)
SODIUM SERPL-SCNC: 140 MMOL/L (ref 132–146)

## 2024-11-07 PROCEDURE — 80053 COMPREHEN METABOLIC PANEL: CPT

## 2024-11-07 PROCEDURE — 82306 VITAMIN D 25 HYDROXY: CPT

## 2024-11-07 PROCEDURE — 36415 COLL VENOUS BLD VENIPUNCTURE: CPT

## 2024-11-07 NOTE — PROGRESS NOTES
Called to Dr. Jovel's office and left message on the prescription refill line that we needed a signed script for Prolia faxed over left fax number and left infusion number for return call if any questions.

## 2024-11-12 ENCOUNTER — TELEPHONE (OUTPATIENT)
Dept: ENDOCRINOLOGY | Age: 74
End: 2024-11-12

## 2024-11-12 ENCOUNTER — HOSPITAL ENCOUNTER (OUTPATIENT)
Dept: INFUSION THERAPY | Age: 74
Setting detail: INFUSION SERIES
Discharge: HOME | End: 2024-11-12
Payer: MEDICARE

## 2024-11-12 VITALS
SYSTOLIC BLOOD PRESSURE: 170 MMHG | HEIGHT: 62 IN | WEIGHT: 215 LBS | BODY MASS INDEX: 39.56 KG/M2 | OXYGEN SATURATION: 97 % | RESPIRATION RATE: 18 BRPM | TEMPERATURE: 97.2 F | HEART RATE: 76 BPM | DIASTOLIC BLOOD PRESSURE: 76 MMHG

## 2024-11-12 DIAGNOSIS — M81.0 SENILE OSTEOPOROSIS: Primary | ICD-10-CM

## 2024-11-12 PROCEDURE — 96372 THER/PROPH/DIAG INJ SC/IM: CPT

## 2024-11-12 PROCEDURE — 6360000002 HC RX W HCPCS: Performed by: INTERNAL MEDICINE

## 2024-11-12 RX ADMIN — DENOSUMAB 60 MG: 60 INJECTION SUBCUTANEOUS at 14:18

## 2024-11-12 ASSESSMENT — PAIN DESCRIPTION - ONSET: ONSET: ON-GOING

## 2024-11-12 ASSESSMENT — PAIN SCALES - GENERAL: PAINLEVEL_OUTOF10: 5

## 2024-11-12 ASSESSMENT — PAIN DESCRIPTION - FREQUENCY: FREQUENCY: INTERMITTENT

## 2024-11-12 ASSESSMENT — PAIN DESCRIPTION - ORIENTATION: ORIENTATION: RIGHT;LEFT

## 2024-11-12 ASSESSMENT — PAIN DESCRIPTION - LOCATION: LOCATION: KNEE

## 2024-11-12 ASSESSMENT — PAIN - FUNCTIONAL ASSESSMENT: PAIN_FUNCTIONAL_ASSESSMENT: PREVENTS OR INTERFERES WITH MANY ACTIVE NOT PASSIVE ACTIVITIES

## 2024-11-12 ASSESSMENT — PAIN DESCRIPTION - DESCRIPTORS: DESCRIPTORS: ACHING

## 2024-11-12 NOTE — TELEPHONE ENCOUNTER
----- Message from Beto JOYCE sent at 11/11/2024  8:09 AM EST -----  Please call patient and inform her vitamin D and calcium are both normal.  Okay for Prolia

## 2024-11-14 ENCOUNTER — HOSPITAL ENCOUNTER (OUTPATIENT)
Dept: OCCUPATIONAL THERAPY | Age: 74
Setting detail: THERAPIES SERIES
Discharge: HOME | End: 2024-11-14
Payer: MEDICARE

## 2024-11-14 PROCEDURE — 97535 SELF CARE MNGMENT TRAINING: CPT | Performed by: OCCUPATIONAL THERAPIST

## 2024-11-14 PROCEDURE — 97140 MANUAL THERAPY 1/> REGIONS: CPT | Performed by: OCCUPATIONAL THERAPIST

## 2024-11-14 NOTE — PROGRESS NOTES
Occupational Therapy  OCCUPATIONAL THERAPY UPDATE/PROGRESS NOTE  [facility]  [address] [facility]  [address]              Phone: [phone]             Fax: [phone]    Date:  2024  Initial Evaluation Date: 2024     Evaluating Therapist: KAELA Ramon/L, MECHE    Patient Name:  Mirna Banuelos    :  1950    Restrictions/Precautions:  fall risk  Diagnosis:  Lymphedema (I89.0), Malignant neoplasm of left breast in female, estrogen receptor positive, unspecified site of breast (C50.912, Z17.0)         Date of Surgery/Injury: n/a    Insurance/Certification information:   [facility] Medicare; [facility]um Plan [facility] [id number]   Plan of care signed (Y/N): N  Visit#:  3   Total Visits to date:  3   Current update duration from 2024 to 2024  Cancels/No-shows to date: 0  Last seen by therapist:  patient making steady gains toward therapy goals.    Patient reaction to treatment:  patient making steady gains toward goals.  Patient continues to attempt to perform manual lymphatic drainage massage sequence.  Patient with continued issues with increased wrist / elbow / shoulder pain when performing.  Patient further continues to have issues with impaired strength and decreased endurance.  Patient will benefit and maximize proper lymphedema management with use of a basic lymphedema pump.  [facility] to [facility] lymphedema [facility].  Patient performs shoulder range of motion exercises as able.  Patient further stated utilizing elevation to assist with fluid management with no improvement noted.  Patient does also work to increase activity / exercise to assist with proper management of lymphedema.  Garment / DME recommended:  compression bra    Referring Practitioner:  Kierra Alcaraz, APRN - CNP      NPI: [id number]   Specific Practitioner Orders: Evaluation and Treatment    Assessment of  Detail Level: Detailed Detail Level: Generalized

## 2024-11-26 ENCOUNTER — HOSPITAL ENCOUNTER (OUTPATIENT)
Dept: OCCUPATIONAL THERAPY | Age: 74
Setting detail: THERAPIES SERIES
Discharge: HOME | End: 2024-11-26
Payer: MEDICARE

## 2024-11-26 PROCEDURE — 97110 THERAPEUTIC EXERCISES: CPT | Performed by: OCCUPATIONAL THERAPIST

## 2024-11-26 PROCEDURE — 97535 SELF CARE MNGMENT TRAINING: CPT | Performed by: OCCUPATIONAL THERAPIST

## 2024-11-26 NOTE — PROGRESS NOTES
that would require patient to stop performing exercises.  Patient able to verbalize understanding.  Therapist to follow up next treatment session.  Patient progressing toward goal.  (Total:  45min)      LT weeks   Patient will be independent with self management of lymphedema including understanding garment wear schedule, self compression bandaging, HEP and self care.    2.  Patient will be fitted for appropriate compression garments for long term management of lymphedema.    3.   Patient will present with decreased limb volume in the involved extremity from minimal to trace  for improved functional mobility.     4.  Patient will increase active/passive shoulder flexion/abduction by 15 degrees to maximize independence and safety during daily life tasks.         Restrictions/Precautions:  [] No blood pressure/blood draw in: [] Left Upper Extremity     [] Right Upper Extremity        [x] Fall Risk       Intervention:   []Other    Pain:  [] No    [x] Yes  Location:  bilateral shoulders, bilateral wrist  Pain Rating (0-10 pain scale):  patient does not rate  Pain Description:  arthritic pain  Interruption of Treatment [x] Yes  [x] No          Patient stated having difficulty performing tasks at home at times secondary to discomfort    Came to Clinic:  [] Bandaged    []Unbandaged    [] With Stocking     [] With Sleeve     [] Kinesiotaped    [] Unna Boot    [] With Alternative Compression:    Skin Integrity:  [] Normal [] Dry  []Rough []Moist []Rash  Location of problem area/s:  [] Wound: Location/Description   [] Fibrosis: Location/Description  [] Keratosis: Location/Description  [] Papillomas: Location/Description  [] Other    Color:  [] Normal [] Mottled [] Flushed [] Other/Description  Location of problem area/s:    Edema:  Edema noted left breast    Subjective:  patient attended treatment session alone.  Patient with increased complaint of left digit swelling and pain.  Patient stated increase trigger finger as

## 2024-12-03 ENCOUNTER — HOSPITAL ENCOUNTER (OUTPATIENT)
Dept: OCCUPATIONAL THERAPY | Age: 74
Setting detail: THERAPIES SERIES
Discharge: HOME | End: 2024-12-03
Payer: MEDICARE

## 2024-12-03 PROCEDURE — 97140 MANUAL THERAPY 1/> REGIONS: CPT | Performed by: OCCUPATIONAL THERAPIST

## 2024-12-03 PROCEDURE — 97535 SELF CARE MNGMENT TRAINING: CPT | Performed by: OCCUPATIONAL THERAPIST

## 2024-12-03 NOTE — PROGRESS NOTES
therapist to perform exercises at least two times prior to next appointment to be able to review and discuss any issues patient may have with exercises.  Patient verbalized understanding.  Patient progressing toward goal.  (Total:  10min)      LT weeks   Patient will be independent with self management of lymphedema including understanding garment wear schedule, self compression bandaging, HEP and self care.    2.  Patient will be fitted for appropriate compression garments for long term management of lymphedema.    3.   Patient will present with decreased limb volume in the involved extremity from minimal to trace  for improved functional mobility.     4.  Patient will increase active/passive shoulder flexion/abduction by 15 degrees to maximize independence and safety during daily life tasks.         Restrictions/Precautions:  [] No blood pressure/blood draw in: [] Left Upper Extremity     [] Right Upper Extremity        [x] Fall Risk       Intervention:   []Other    Pain:  [] No    [x] Yes  Location:  bilateral shoulders, bilateral wrist  Pain Rating (0-10 pain scale):  patient does not rate  Pain Description:  arthritic pain  Interruption of Treatment [x] Yes  [x] No          Patient stated having difficulty performing tasks at home at times secondary to discomfort    Came to Clinic:  [] Bandaged    []Unbandaged    [] With Stocking     [] With Sleeve     [] Kinesiotaped    [] Unna Boot    [] With Alternative Compression:    Skin Integrity:  [] Normal [] Dry  []Rough []Moist []Rash  Location of problem area/s:  [] Wound: Location/Description   [] Fibrosis: Location/Description  [] Keratosis: Location/Description  [] Papillomas: Location/Description  [] Other    Color:  [] Normal [] Mottled [] Flushed [] Other/Description  Location of problem area/s:    Edema:  Edema noted left breast    Subjective:  patient attended treatment session alone.  Patient with no new issues noted.    Objective:  []

## 2024-12-05 ENCOUNTER — TELEPHONE (OUTPATIENT)
Dept: OCCUPATIONAL THERAPY | Age: 74
End: 2024-12-05

## 2024-12-05 NOTE — TELEPHONE ENCOUNTER
Therapist received patient message and returned call.  Patient will be receiving her lymphedema pump in next week or so.  Therapist and patient agree to hold treatment until patient has used pump in order to determine how pump is working for patient.  Patient next appointment 7Jan. 2025.

## 2024-12-10 ENCOUNTER — APPOINTMENT (OUTPATIENT)
Dept: OCCUPATIONAL THERAPY | Age: 74
End: 2024-12-10
Payer: MEDICARE

## 2025-01-07 ENCOUNTER — TELEPHONE (OUTPATIENT)
Dept: OCCUPATIONAL THERAPY | Age: 75
End: 2025-01-07

## 2025-01-07 ENCOUNTER — HOSPITAL ENCOUNTER (OUTPATIENT)
Dept: OCCUPATIONAL THERAPY | Age: 75
Setting detail: THERAPIES SERIES
Discharge: HOME | End: 2025-01-07

## 2025-01-07 NOTE — PROGRESS NOTES
Occupational Therapy          [facility]  [facility]  [address] [address]  Dept: [phone]  Loc: [phone]   [facility] OT Fax: [phone]    Cancellation/No Show Note      Date:  2025    Patient Name:  Mirna Banuelos     :  1950         PT ID: [id number]    Total missed visits including today: 0       Total number of no shows: 0    For today's appointment patient:   [x]  Cancelled   []  Rescheduled appointment   []  No-show     Reason given by patient:   []  Patient ill   []  Conflicting appointment   []  No transportation   []  Conflict with work   []  No reason given   []  Other:    Comments:                    Comments:  patient called outpatient clinic and cancelled scheduled lymphedema appointment.  Therapist rescheduled appointment for 30Jan. .    Electronically signed by:  Dawit Oconnell OTR/L, CLT-GASPER

## 2025-01-07 NOTE — TELEPHONE ENCOUNTER
Therapist called patient to rescheduled cancelled lymphedema appointment.  Therapist was asked by patient to return call at later time secondary to not able to discuss scheduling at this time.  Therapist to return call at a later time.

## 2025-01-30 ENCOUNTER — HOSPITAL ENCOUNTER (OUTPATIENT)
Dept: OCCUPATIONAL THERAPY | Age: 75
Setting detail: THERAPIES SERIES
Discharge: HOME | End: 2025-01-30

## 2025-01-30 NOTE — PROGRESS NOTES
Occupational Therapy  OCCUPATIONAL THERAPY UPDATE/PROGRESS NOTE  [facility]  [address] [facility]  [address]              Phone: [phone]             Fax: [phone]    Date:  2025  Initial Evaluation Date: 2024     Evaluating Therapist: KAELA Ramon/L, MECHE    Patient Name:  Mirna Banuelos    :  1950    Restrictions/Precautions:  fall risk  Diagnosis:  Lymphedema (I89.0), Malignant neoplasm of left breast in female, estrogen receptor positive, unspecified site of breast (C50.912, Z17.0)         Date of Surgery/Injury: n/a    Insurance/Certification information:   [facility] Medicare; [facility] [id number]   Plan of care signed (Y/N): N  Visit#:  5   Total Visits to date:  Evaluation +Visit #5   Current update duration from 2024 to 2025  Cancels/No-shows to date: 0  Last seen by therapist:  patient making steady gains toward therapy goals.    Patient reaction to treatment:  patient making steady gains toward goals.  Patient has received her lymphedema pump several weeks ago.  Patient has moved into an apartment recently and has just now begun use of lymphedema pump this week.  Patient has utilized three times.  Patient has bras received from DME Oct. 2024 and utilizes as able.  Patient does continue to utilize underwire bras at times.  Patient aware of and understands why they are contraindicated by this therapist and her oncology doctors.  Patient continues to perform her range of motion exercises as able.  Patient notes she is unable to complete full group of exercises at one time.  Patient further attempted to follow low sodium eating lifestyle to assist in the management of lymphedema.      Garment / DME recommended:  compression bra    Referring Practitioner:  Kierra Alcaraz, APRN - CNP      NPI: [id number]   Specific Practitioner Orders: Evaluation and Treatment    Assessment of current deficits

## 2025-02-06 ENCOUNTER — TELEPHONE (OUTPATIENT)
Dept: OCCUPATIONAL THERAPY | Age: 75
End: 2025-02-06

## 2025-02-06 NOTE — TELEPHONE ENCOUNTER
Therapist returning patient call.  Patient having issues with her [employer] pump.  Pump stopped working and patient could not get pump to work.  Patient used again and pump would not stop inflating with air.  Therapist provided information for [employer] rep to call and discuss issues.  Patient will notify with what was done to fix problem.

## 2025-02-12 ENCOUNTER — HOSPITAL ENCOUNTER (OUTPATIENT)
Dept: INFUSION THERAPY | Age: 75
End: 2025-02-12

## 2025-03-12 ENCOUNTER — HOSPITAL ENCOUNTER (OUTPATIENT)
Dept: INFUSION THERAPY | Age: 75
Discharge: HOME | End: 2025-03-12
Payer: MEDICARE

## 2025-03-12 ENCOUNTER — OFFICE VISIT (OUTPATIENT)
Dept: ONCOLOGY | Age: 75
End: 2025-03-12
Payer: MEDICARE

## 2025-03-12 VITALS
WEIGHT: 222 LBS | SYSTOLIC BLOOD PRESSURE: 157 MMHG | DIASTOLIC BLOOD PRESSURE: 78 MMHG | BODY MASS INDEX: 40.85 KG/M2 | TEMPERATURE: 98 F | HEART RATE: 76 BPM | OXYGEN SATURATION: 97 % | HEIGHT: 62 IN

## 2025-03-12 DIAGNOSIS — C18.9 MALIGNANT NEOPLASM OF COLON, UNSPECIFIED PART OF COLON (HCC): ICD-10-CM

## 2025-03-12 DIAGNOSIS — I82.599 CHRONIC DEEP VEIN THROMBOSIS (DVT) OF OTHER VEIN OF LOWER EXTREMITY, UNSPECIFIED LATERALITY (HCC): Primary | ICD-10-CM

## 2025-03-12 DIAGNOSIS — M81.0 AGE-RELATED OSTEOPOROSIS WITHOUT CURRENT PATHOLOGICAL FRACTURE: ICD-10-CM

## 2025-03-12 LAB
ALBUMIN SERPL-MCNC: 3.9 G/DL (ref 3.5–5.2)
ALP SERPL-CCNC: 54 U/L (ref 35–104)
ALT SERPL-CCNC: 11 U/L (ref 0–32)
ANION GAP SERPL CALCULATED.3IONS-SCNC: 11 MMOL/L (ref 7–16)
AST SERPL-CCNC: 23 U/L (ref 0–31)
BASOPHILS # BLD: 0.05 K/UL (ref 0–0.2)
BASOPHILS NFR BLD: 1 % (ref 0–2)
BILIRUB SERPL-MCNC: 0.7 MG/DL (ref 0–1.2)
BUN SERPL-MCNC: 23 MG/DL (ref 6–23)
CALCIUM SERPL-MCNC: 9.2 MG/DL (ref 8.6–10.2)
CHLORIDE SERPL-SCNC: 103 MMOL/L (ref 98–107)
CO2 SERPL-SCNC: 24 MMOL/L (ref 22–29)
CREAT SERPL-MCNC: 1.2 MG/DL (ref 0.5–1)
EOSINOPHIL # BLD: 0.16 K/UL (ref 0.05–0.5)
EOSINOPHILS RELATIVE PERCENT: 2 % (ref 0–6)
ERYTHROCYTE [DISTWIDTH] IN BLOOD BY AUTOMATED COUNT: 12.8 % (ref 11.5–15)
GFR, ESTIMATED: 47 ML/MIN/1.73M2
GLUCOSE SERPL-MCNC: 186 MG/DL (ref 74–99)
HCT VFR BLD AUTO: 40.5 % (ref 34–48)
HGB BLD-MCNC: 13.2 G/DL (ref 11.5–15.5)
IMM GRANULOCYTES # BLD AUTO: <0.03 K/UL (ref 0–0.58)
IMM GRANULOCYTES NFR BLD: 0 % (ref 0–5)
LYMPHOCYTES NFR BLD: 1.3 K/UL (ref 1.5–4)
LYMPHOCYTES RELATIVE PERCENT: 19 % (ref 20–42)
MCH RBC QN AUTO: 30.8 PG (ref 26–35)
MCHC RBC AUTO-ENTMCNC: 32.6 G/DL (ref 32–34.5)
MCV RBC AUTO: 94.6 FL (ref 80–99.9)
MONOCYTES NFR BLD: 0.6 K/UL (ref 0.1–0.95)
MONOCYTES NFR BLD: 9 % (ref 2–12)
NEUTROPHILS NFR BLD: 69 % (ref 43–80)
NEUTS SEG NFR BLD: 4.73 K/UL (ref 1.8–7.3)
PLATELET, FLUORESCENCE: 161 K/UL (ref 130–450)
PMV BLD AUTO: 9.2 FL (ref 7–12)
POTASSIUM SERPL-SCNC: 3.9 MMOL/L (ref 3.5–5)
PROT SERPL-MCNC: 7.6 G/DL (ref 6.4–8.3)
RBC # BLD AUTO: 4.28 M/UL (ref 3.5–5.5)
SODIUM SERPL-SCNC: 138 MMOL/L (ref 132–146)
WBC OTHER # BLD: 6.9 K/UL (ref 4.5–11.5)

## 2025-03-12 PROCEDURE — 85025 COMPLETE CBC W/AUTO DIFF WBC: CPT

## 2025-03-12 PROCEDURE — 99212 OFFICE O/P EST SF 10 MIN: CPT

## 2025-03-12 PROCEDURE — 80053 COMPREHEN METABOLIC PANEL: CPT

## 2025-03-12 PROCEDURE — 36415 COLL VENOUS BLD VENIPUNCTURE: CPT

## 2025-03-12 RX ORDER — ANASTROZOLE 1 MG/1
1 TABLET ORAL DAILY
Qty: 90 TABLET | Refills: 3 | Status: SHIPPED | OUTPATIENT
Start: 2025-03-12

## 2025-03-12 NOTE — PROGRESS NOTES
Patient provided discharge AVS, all questions answered  
patient was found to have an area of parenchymal distortion in the left breast, which was consistent with mild fibrocystic changes,had a repeat left breast biopsy done on 11/21/2022, pathology was consistent with invasive ductal carcinoma, grade 3, with associated DCIS, ER +90%, DE +70%, HER2/stephane negative, 1+ by IHC.  Ki-67 is 70%.    The patient underwent on 2/1/2023 left localized breast lumpectomy with left axillary sentinel lymph node excisional biopsy, pathology:   CANCER CASE SUMMARY   Procedure -excision   Specimen Laterality -left   Tumor Site, Invasive Carcinoma-lower outer quadrant   Histologic Type -invasive carcinoma, no special type (ductal)   Histologic Grade (Nemo Histologic Score):                    Tubule differentiation- score-3                    Nuclear pleomorphism- score 3                    Mitotic rate- score 2                    Overall Grade- grade 3, (score 8)   Tumor Size: Size of Largest Invasive Carcinoma-approximately 16 mm   Ductal Carcinoma In Situ (DCIS)-present, negative for extensive   intraductal component   Lymphovascular Invasion-not identified   Treatment Effect in the Breast-no known presurgical therapy   Margin status for invasive carcinoma: All margins negative for invasive   carcinoma   Distance from invasive carcinoma to closest margin-2 mm/inferior   Margin Status for DCIS-all margins negative for DCIS   Distance from DCIS to closest margin (if applicable)-5 mm/medial   Regional Lymph Nodes: Tumor present in regional lymph node (sentinel)   Number of lymph nodes with macrometastases-1   Size of largest ariadna metastatic deposit 3 mm   Extranodal extension-not identified   Total number of lymph nodes examined-1 (sentinel)   Pathologic Stage Classification (AJCC 8th Edition)- pT1c, p(sn)N1a   Special studies-ER positive, DE positive, HER2 negative, Ki-67 index 70%   Additional Note: Intradepartmental consultation is obtained.     The patient completed adjuvant

## 2025-03-20 ENCOUNTER — HOSPITAL ENCOUNTER (OUTPATIENT)
Dept: OCCUPATIONAL THERAPY | Age: 75
Setting detail: THERAPIES SERIES
Discharge: HOME | End: 2025-03-20
Payer: MEDICARE

## 2025-03-20 PROCEDURE — 97535 SELF CARE MNGMENT TRAINING: CPT | Performed by: OCCUPATIONAL THERAPIST

## 2025-03-20 NOTE — DISCHARGE SUMMARY
flexion/abduction 0 - 124 degrees.  Patient goal partially met.  (Total:  5min)     LT weeks   Patient will be independent with self management of lymphedema including understanding garment wear schedule, self compression bandaging, HEP and self care.  Therapist continued patient education regarding independent management of lymphedema.  Therapist discussed current home management to include manual lymphatic drainage massage sequence, increased activity / exercise, range of motion exercises, proper eating lifestyle, and general self care.  Patient able to verbalize understanding.  Patient goal partially met.  (Total:  5min)     2.  Patient will be fitted for appropriate compression garments for long term management of lymphedema.  Therapist continued patient education regarding compression garment options.  Patient currently utilizes   \"Fashion\" type bra with inserts for fluid management.  Patient and therapist had lengthy discussion regarding need to be fitted for a \"compression\" bra.  Patient able to verbalize understanding and will discuss further with fitter.  Patient goal partially met.  (Total:  5min)    3.   Patient will present with decreased limb volume in the involved extremity from minimal to trace  for improved functional mobility.   Patient goal not met.    4.  Patient will increase active/passive shoulder flexion/abduction by 15 degrees to maximize independence and safety during daily life tasks.    Patient goal not met.    Significant Findings At Last Visit/Comments:    patient making steady gains toward therapy goals.    Patient reaction to treatment:  patient made steady gains toward goals.  Patient had issue with pump that has been resolved.  Patient currently utilizing lymphedema pump twice a day.  Patient with increase discomfort at hand with use and is believed to be due to arthritis.  Patient to discuss further with lymphapress.  Therapist recommended reducing pressure by five mmHg.

## 2025-05-05 DIAGNOSIS — E55.9 VITAMIN D DEFICIENCY: ICD-10-CM

## 2025-05-05 DIAGNOSIS — M81.0 OSTEOPOROSIS, UNSPECIFIED OSTEOPOROSIS TYPE, UNSPECIFIED PATHOLOGICAL FRACTURE PRESENCE: ICD-10-CM

## 2025-05-05 DIAGNOSIS — M81.0 OSTEOPOROSIS, UNSPECIFIED OSTEOPOROSIS TYPE, UNSPECIFIED PATHOLOGICAL FRACTURE PRESENCE: Primary | ICD-10-CM

## 2025-05-05 LAB
ANION GAP SERPL CALCULATED.3IONS-SCNC: 13 MMOL/L (ref 7–16)
BUN BLDV-MCNC: 19 MG/DL (ref 8–23)
CALCIUM SERPL-MCNC: 9.7 MG/DL (ref 8.8–10.2)
CHLORIDE BLD-SCNC: 103 MMOL/L (ref 98–107)
CO2: 24 MMOL/L (ref 22–29)
CREAT SERPL-MCNC: 1.1 MG/DL (ref 0.5–1)
GFR, ESTIMATED: 51 ML/MIN/1.73M2
GLUCOSE BLD-MCNC: 135 MG/DL (ref 74–99)
POTASSIUM SERPL-SCNC: 5 MMOL/L (ref 3.5–5.1)
SODIUM BLD-SCNC: 140 MMOL/L (ref 136–145)
VITAMIN D 25-HYDROXY: 36.9 NG/ML (ref 30–100)

## 2025-05-06 ENCOUNTER — RESULTS FOLLOW-UP (OUTPATIENT)
Dept: ENDOCRINOLOGY | Age: 75
End: 2025-05-06

## 2025-05-06 NOTE — TELEPHONE ENCOUNTER
Notify patient  Calcium and vitamin D level are good.  Okay to proceed with the Prolia injections as scheduled

## 2025-05-12 ENCOUNTER — HOSPITAL ENCOUNTER (OUTPATIENT)
Dept: INFUSION THERAPY | Age: 75
Setting detail: INFUSION SERIES
Discharge: HOME | End: 2025-05-12
Payer: MEDICARE

## 2025-05-12 VITALS
TEMPERATURE: 96.6 F | WEIGHT: 220 LBS | HEART RATE: 90 BPM | RESPIRATION RATE: 18 BRPM | HEIGHT: 62 IN | SYSTOLIC BLOOD PRESSURE: 164 MMHG | OXYGEN SATURATION: 95 % | DIASTOLIC BLOOD PRESSURE: 72 MMHG | BODY MASS INDEX: 40.48 KG/M2

## 2025-05-12 DIAGNOSIS — M81.0 SENILE OSTEOPOROSIS: Primary | ICD-10-CM

## 2025-05-12 PROCEDURE — 6360000002 HC RX W HCPCS: Performed by: INTERNAL MEDICINE

## 2025-05-12 PROCEDURE — 96372 THER/PROPH/DIAG INJ SC/IM: CPT

## 2025-05-12 RX ADMIN — DENOSUMAB 60 MG: 60 INJECTION SUBCUTANEOUS at 14:42

## 2025-05-12 ASSESSMENT — PAIN DESCRIPTION - DESCRIPTORS: DESCRIPTORS: ACHING

## 2025-05-12 ASSESSMENT — PAIN SCALES - GENERAL: PAINLEVEL_OUTOF10: 7

## 2025-05-12 ASSESSMENT — PAIN DESCRIPTION - ORIENTATION: ORIENTATION: RIGHT;LEFT

## 2025-05-12 ASSESSMENT — PAIN DESCRIPTION - FREQUENCY: FREQUENCY: CONTINUOUS

## 2025-05-12 ASSESSMENT — PAIN DESCRIPTION - PAIN TYPE: TYPE: CHRONIC PAIN

## 2025-05-12 ASSESSMENT — PAIN DESCRIPTION - LOCATION: LOCATION: KNEE

## 2025-08-25 DIAGNOSIS — Z12.31 VISIT FOR SCREENING MAMMOGRAM: Primary | ICD-10-CM

## (undated) DEVICE — 3M(TM) MEDIPORE(TM) +PAD SOFT CLOTH ADHESIVE WOUND DRESSING 3569: Brand: 3M™ MEDIPORE™

## (undated) DEVICE — STRIP,CLOSURE,WOUND,MEDI-STRIP,1/2X4: Brand: MEDLINE

## (undated) DEVICE — GOWN,SIRUS,FABRNF,XL,20/CS: Brand: MEDLINE

## (undated) DEVICE — TOWEL,OR,DSP,ST,BLUE,STD,6/PK,12PK/CS: Brand: MEDLINE

## (undated) DEVICE — APPLIER LIG CLP M L11IN TI STR RNG HNDL FOR 20 CLP DISP

## (undated) DEVICE — APPLICATOR MEDICATED 26 CC SOLUTION HI LT ORNG CHLORAPREP

## (undated) DEVICE — SYRINGE MED 10ML TRNSLUC BRL PLUNG BLK MRK POLYPR CTRL

## (undated) DEVICE — SOLUTION IV IRRIG POUR BRL 0.9% SODIUM CHL 2F7124

## (undated) DEVICE — GLOVE SURG SZ 65 THK91MIL LTX FREE SYN POLYISOPRENE

## (undated) DEVICE — GLOVE SURG L12IN SZ 65FNGR THK94MIL TRNSLUC YEL LTX

## (undated) DEVICE — PLASMABLADE PS210-030S 3.0S LOCK: Brand: PLASMABLADE™

## (undated) DEVICE — TUBING, SUCTION, 3/16" X 12', STRAIGHT: Brand: MEDLINE

## (undated) DEVICE — STANDARD HYPODERMIC NEEDLE,ALUMINUM HUB: Brand: MONOJECT

## (undated) DEVICE — UNIVERSAL DRAPE: Brand: MEDLINE INDUSTRIES, INC.

## (undated) DEVICE — GOWN,SIRUS,FABRNF,L,20/CS: Brand: MEDLINE

## (undated) DEVICE — PROBE GAM TRUNODE DISP EACH

## (undated) DEVICE — Device

## (undated) DEVICE — GLOVE ORANGE PI 7 1/2   MSG9075

## (undated) DEVICE — DRAPE WARMER SLUSH 66X44IN

## (undated) DEVICE — SOLUTION IV IRRIG 1000ML POUR BTL 2F7114

## (undated) DEVICE — Z INACTIVE USE 2855134 STRIP SKIN CLSR W1XL5IN NYL REINF CURAD